# Patient Record
Sex: MALE | Race: WHITE
[De-identification: names, ages, dates, MRNs, and addresses within clinical notes are randomized per-mention and may not be internally consistent; named-entity substitution may affect disease eponyms.]

---

## 2018-06-08 ENCOUNTER — HOSPITAL ENCOUNTER (INPATIENT)
Dept: HOSPITAL 47 - 6ICU | Age: 78
LOS: 10 days | Discharge: NURSING, REHAB OR LONG-TERM CARE | DRG: 236 | End: 2018-06-18
Payer: MEDICARE

## 2018-06-08 VITALS — BODY MASS INDEX: 25.3 KG/M2

## 2018-06-08 DIAGNOSIS — M47.816: ICD-10-CM

## 2018-06-08 DIAGNOSIS — J96.11: ICD-10-CM

## 2018-06-08 DIAGNOSIS — Z86.14: ICD-10-CM

## 2018-06-08 DIAGNOSIS — K21.9: ICD-10-CM

## 2018-06-08 DIAGNOSIS — H26.9: ICD-10-CM

## 2018-06-08 DIAGNOSIS — F17.200: ICD-10-CM

## 2018-06-08 DIAGNOSIS — Z99.81: ICD-10-CM

## 2018-06-08 DIAGNOSIS — Z86.718: ICD-10-CM

## 2018-06-08 DIAGNOSIS — J44.9: ICD-10-CM

## 2018-06-08 DIAGNOSIS — Z82.49: ICD-10-CM

## 2018-06-08 DIAGNOSIS — Z79.02: ICD-10-CM

## 2018-06-08 DIAGNOSIS — Z79.84: ICD-10-CM

## 2018-06-08 DIAGNOSIS — Z80.0: ICD-10-CM

## 2018-06-08 DIAGNOSIS — G47.33: ICD-10-CM

## 2018-06-08 DIAGNOSIS — Z96.652: ICD-10-CM

## 2018-06-08 DIAGNOSIS — I25.110: ICD-10-CM

## 2018-06-08 DIAGNOSIS — I11.0: ICD-10-CM

## 2018-06-08 DIAGNOSIS — D75.82: ICD-10-CM

## 2018-06-08 DIAGNOSIS — I21.4: Primary | ICD-10-CM

## 2018-06-08 DIAGNOSIS — Z89.021: ICD-10-CM

## 2018-06-08 DIAGNOSIS — Z95.0: ICD-10-CM

## 2018-06-08 DIAGNOSIS — N40.0: ICD-10-CM

## 2018-06-08 DIAGNOSIS — I48.2: ICD-10-CM

## 2018-06-08 DIAGNOSIS — Z89.421: ICD-10-CM

## 2018-06-08 DIAGNOSIS — Z90.49: ICD-10-CM

## 2018-06-08 DIAGNOSIS — E11.51: ICD-10-CM

## 2018-06-08 DIAGNOSIS — E78.5: ICD-10-CM

## 2018-06-08 DIAGNOSIS — M51.9: ICD-10-CM

## 2018-06-08 DIAGNOSIS — Z87.01: ICD-10-CM

## 2018-06-08 DIAGNOSIS — D64.9: ICD-10-CM

## 2018-06-08 DIAGNOSIS — I08.0: ICD-10-CM

## 2018-06-08 DIAGNOSIS — Z79.01: ICD-10-CM

## 2018-06-08 DIAGNOSIS — Z79.82: ICD-10-CM

## 2018-06-08 DIAGNOSIS — I50.42: ICD-10-CM

## 2018-06-08 DIAGNOSIS — J98.11: ICD-10-CM

## 2018-06-08 DIAGNOSIS — Z79.899: ICD-10-CM

## 2018-06-08 DIAGNOSIS — I65.23: ICD-10-CM

## 2018-06-08 DIAGNOSIS — E11.40: ICD-10-CM

## 2018-06-08 DIAGNOSIS — Z87.11: ICD-10-CM

## 2018-06-08 DIAGNOSIS — G89.4: ICD-10-CM

## 2018-06-08 LAB
APTT BLD: 28.2 SEC (ref 22–30)
BASOPHILS # BLD AUTO: 0 K/UL (ref 0–0.2)
BASOPHILS NFR BLD AUTO: 0 %
EOSINOPHIL # BLD AUTO: 0.1 K/UL (ref 0–0.7)
EOSINOPHIL NFR BLD AUTO: 0 %
ERYTHROCYTE [DISTWIDTH] IN BLOOD BY AUTOMATED COUNT: 4.5 M/UL (ref 4.3–5.9)
ERYTHROCYTE [DISTWIDTH] IN BLOOD: 14.8 % (ref 11.5–15.5)
HCT VFR BLD AUTO: 38.9 % (ref 39–53)
HGB BLD-MCNC: 12.9 GM/DL (ref 13–17.5)
INR PPP: 1.4 (ref ?–1.2)
LYMPHOCYTES # SPEC AUTO: 1.4 K/UL (ref 1–4.8)
LYMPHOCYTES NFR SPEC AUTO: 12 %
MCH RBC QN AUTO: 28.7 PG (ref 25–35)
MCHC RBC AUTO-ENTMCNC: 33.2 G/DL (ref 31–37)
MCV RBC AUTO: 86.4 FL (ref 80–100)
MONOCYTES # BLD AUTO: 0.7 K/UL (ref 0–1)
MONOCYTES NFR BLD AUTO: 6 %
NEUTROPHILS # BLD AUTO: 8.9 K/UL (ref 1.3–7.7)
NEUTROPHILS NFR BLD AUTO: 80 %
PH UR: 5.5 [PH] (ref 5–8)
PLATELET # BLD AUTO: 164 K/UL (ref 150–450)
PT BLD: 13.2 SEC (ref 9–12)
RBC UR QL: >182 /HPF (ref 0–5)
SP GR UR: 1.02 (ref 1–1.03)
UROBILINOGEN UR QL STRIP: <2 MG/DL (ref ?–2)
WBC # BLD AUTO: 11.1 K/UL (ref 3.8–10.6)
WBC #/AREA URNS HPF: 21 /HPF (ref 0–5)

## 2018-06-08 PROCEDURE — 86900 BLOOD TYPING SEROLOGIC ABO: CPT

## 2018-06-08 PROCEDURE — 83036 HEMOGLOBIN GLYCOSYLATED A1C: CPT

## 2018-06-08 PROCEDURE — 83735 ASSAY OF MAGNESIUM: CPT

## 2018-06-08 PROCEDURE — 85520 HEPARIN ASSAY: CPT

## 2018-06-08 PROCEDURE — 84484 ASSAY OF TROPONIN QUANT: CPT

## 2018-06-08 PROCEDURE — 94760 N-INVAS EAR/PLS OXIMETRY 1: CPT

## 2018-06-08 PROCEDURE — 71045 X-RAY EXAM CHEST 1 VIEW: CPT

## 2018-06-08 PROCEDURE — 87205 SMEAR GRAM STAIN: CPT

## 2018-06-08 PROCEDURE — 84443 ASSAY THYROID STIM HORMONE: CPT

## 2018-06-08 PROCEDURE — 93306 TTE W/DOPPLER COMPLETE: CPT

## 2018-06-08 PROCEDURE — 80053 COMPREHEN METABOLIC PANEL: CPT

## 2018-06-08 PROCEDURE — 85610 PROTHROMBIN TIME: CPT

## 2018-06-08 PROCEDURE — 85730 THROMBOPLASTIN TIME PARTIAL: CPT

## 2018-06-08 PROCEDURE — 84132 ASSAY OF SERUM POTASSIUM: CPT

## 2018-06-08 PROCEDURE — 86891 AUTOLOGOUS BLOOD OP SALVAGE: CPT

## 2018-06-08 PROCEDURE — 86850 RBC ANTIBODY SCREEN: CPT

## 2018-06-08 PROCEDURE — 85025 COMPLETE CBC W/AUTO DIFF WBC: CPT

## 2018-06-08 PROCEDURE — 87070 CULTURE OTHR SPECIMN AEROBIC: CPT

## 2018-06-08 PROCEDURE — 86901 BLOOD TYPING SEROLOGIC RH(D): CPT

## 2018-06-08 PROCEDURE — 87077 CULTURE AEROBIC IDENTIFY: CPT

## 2018-06-08 PROCEDURE — 93923 UPR/LXTR ART STDY 3+ LVLS: CPT

## 2018-06-08 PROCEDURE — 71046 X-RAY EXAM CHEST 2 VIEWS: CPT

## 2018-06-08 PROCEDURE — 82805 BLOOD GASES W/O2 SATURATION: CPT

## 2018-06-08 PROCEDURE — 84100 ASSAY OF PHOSPHORUS: CPT

## 2018-06-08 PROCEDURE — 93880 EXTRACRANIAL BILAT STUDY: CPT

## 2018-06-08 PROCEDURE — 94640 AIRWAY INHALATION TREATMENT: CPT

## 2018-06-08 PROCEDURE — 86920 COMPATIBILITY TEST SPIN: CPT

## 2018-06-08 PROCEDURE — 80048 BASIC METABOLIC PNL TOTAL CA: CPT

## 2018-06-08 PROCEDURE — 93970 EXTREMITY STUDY: CPT

## 2018-06-08 PROCEDURE — 94002 VENT MGMT INPAT INIT DAY: CPT

## 2018-06-08 PROCEDURE — 82330 ASSAY OF CALCIUM: CPT

## 2018-06-08 PROCEDURE — 80074 ACUTE HEPATITIS PANEL: CPT

## 2018-06-08 PROCEDURE — 86022 PLATELET ANTIBODIES: CPT

## 2018-06-08 PROCEDURE — 87186 SC STD MICRODIL/AGAR DIL: CPT

## 2018-06-08 PROCEDURE — 87086 URINE CULTURE/COLONY COUNT: CPT

## 2018-06-08 PROCEDURE — 85027 COMPLETE CBC AUTOMATED: CPT

## 2018-06-08 PROCEDURE — 80061 LIPID PANEL: CPT

## 2018-06-08 PROCEDURE — 81001 URINALYSIS AUTO W/SCOPE: CPT

## 2018-06-08 RX ADMIN — POTASSIUM CHLORIDE SCH MLS/HR: 14.9 INJECTION, SOLUTION INTRAVENOUS at 16:30

## 2018-06-08 RX ADMIN — IPRATROPIUM BROMIDE AND ALBUTEROL SULFATE SCH ML: .5; 3 SOLUTION RESPIRATORY (INHALATION) at 15:51

## 2018-06-08 RX ADMIN — GABAPENTIN SCH MG: 100 CAPSULE ORAL at 16:30

## 2018-06-08 RX ADMIN — MUPIROCIN SCH APPLIC: 20 OINTMENT TOPICAL at 21:03

## 2018-06-08 RX ADMIN — SODIUM CHLORIDE SCH MLS/HR: 450 INJECTION, SOLUTION INTRAVENOUS at 21:01

## 2018-06-08 RX ADMIN — IPRATROPIUM BROMIDE AND ALBUTEROL SULFATE SCH ML: .5; 3 SOLUTION RESPIRATORY (INHALATION) at 20:42

## 2018-06-08 RX ADMIN — GABAPENTIN SCH MG: 100 CAPSULE ORAL at 21:03

## 2018-06-08 NOTE — US
EXAMINATION TYPE: US carotid duplex BILAT

 

DATE OF EXAM: 6/8/2018

 

COMPARISON: NONE

 

CLINICAL HISTORY: preop cardiac surgery.

 

EXAM MEASUREMENTS: 

 

RIGHT:  Peak Systolic Velocity (PSV) cm/sec

----- Right CCA:  71.6  

----- Right ICA:  81.0     

----- Right ECA:  101.1   

ICA/CCA ratio:  1.1    

 

RIGHT:  End Diastole cm/sec

----- Right CCA: 20.7   

----- Right ICA:  29.3      

----- Right ECA:  7.7     

 

LEFT:  Peak Systolic Velocity (PSV) cm/sec

----- Left CCA:  74.4  

----- Left ICA:  76.4   

----- Left ECA:  74.4  

ICA/CCA ratio:  1.0  

 

LEFT:  End Diastole cm/sec

----- Left CCA:  14.8  

----- Left ICA: 28.0

----- Left ECA:  0.0 

 

VERTEBRALS (direction of flow):

Right Vertebral: Antegrade

Left Vertebral: Antegrade

 

Rhythm:  Normal

 

Mild to moderate atherosclerotic changes with no significant velocity elevations.

 

 

 

IMPRESSION:  There is bilateral moderate plaque formation. There is antegrade flow in the vertebral a
rteries. The images and measurements suggest up to 50% stenosis in both internal carotid arteries.   


 

 

Criteria for Assigning % of Stenosis / Diameter reduction

(Estimation based on the indirect measurements of the internal carotid artery velocities (ICA PSV).

1.  Normal (no stenosis)=ICA PSV < 125 cm/s: ratio < 2.0: ICA EDV<40 cm/s.

2. Less than 50% stenosis=ICA PSV < 125 cm/s: ratio < 2.0: ICA EDV<40 cm/s.

3.  50 to 69% stenosis=ICA PSV of 125 to 230 cm/s: ration 2.0 ? 4.0: ICA EDV  cm/s.

4.  Greater than 70% stenosis to near occlusion= ICA PSV > 230 cm/s: ratio > 4.0: ICA EDV > 100 cm/s.
 

5.  Near occlusion= ICA PSV velocities may be low or undetectable: variable ratio and ICA EDV.

6.  Total occlusion=unable to detect flow.

## 2018-06-09 LAB
ALBUMIN SERPL-MCNC: 3.3 G/DL (ref 3.5–5)
ALP SERPL-CCNC: 66 U/L (ref 38–126)
ALT SERPL-CCNC: 35 U/L (ref 21–72)
ANION GAP SERPL CALC-SCNC: 10 MMOL/L
APTT BLD: 30.3 SEC (ref 22–30)
AST SERPL-CCNC: 33 U/L (ref 17–59)
BASOPHILS # BLD AUTO: 0 K/UL (ref 0–0.2)
BASOPHILS NFR BLD AUTO: 0 %
BUN SERPL-SCNC: 24 MG/DL (ref 9–20)
CALCIUM SPEC-MCNC: 8.6 MG/DL (ref 8.4–10.2)
CHLORIDE SERPL-SCNC: 101 MMOL/L (ref 98–107)
CHOLEST SERPL-MCNC: 124 MG/DL (ref ?–200)
CO2 SERPL-SCNC: 32 MMOL/L (ref 22–30)
EOSINOPHIL # BLD AUTO: 0.1 K/UL (ref 0–0.7)
EOSINOPHIL NFR BLD AUTO: 1 %
ERYTHROCYTE [DISTWIDTH] IN BLOOD BY AUTOMATED COUNT: 4.6 M/UL (ref 4.3–5.9)
ERYTHROCYTE [DISTWIDTH] IN BLOOD: 14.9 % (ref 11.5–15.5)
GLUCOSE BLD-MCNC: 87 MG/DL (ref 75–99)
GLUCOSE SERPL-MCNC: 111 MG/DL (ref 74–99)
HBA1C MFR BLD: 5.9 % (ref 4–6)
HCT VFR BLD AUTO: 40.4 % (ref 39–53)
HDLC SERPL-MCNC: 43 MG/DL (ref 40–60)
HGB BLD-MCNC: 13.3 GM/DL (ref 13–17.5)
INR PPP: 1.3 (ref ?–1.2)
LDLC SERPL CALC-MCNC: 61 MG/DL (ref 0–99)
LYMPHOCYTES # SPEC AUTO: 1.4 K/UL (ref 1–4.8)
LYMPHOCYTES NFR SPEC AUTO: 20 %
MAGNESIUM SPEC-SCNC: 2 MG/DL (ref 1.6–2.3)
MCH RBC QN AUTO: 28.9 PG (ref 25–35)
MCHC RBC AUTO-ENTMCNC: 32.9 G/DL (ref 31–37)
MCV RBC AUTO: 87.7 FL (ref 80–100)
MONOCYTES # BLD AUTO: 0.5 K/UL (ref 0–1)
MONOCYTES NFR BLD AUTO: 7 %
NEUTROPHILS # BLD AUTO: 5 K/UL (ref 1.3–7.7)
NEUTROPHILS NFR BLD AUTO: 71 %
PLATELET # BLD AUTO: 118 K/UL (ref 150–450)
POTASSIUM SERPL-SCNC: 3.7 MMOL/L (ref 3.5–5.1)
PROT SERPL-MCNC: 5.7 G/DL (ref 6.3–8.2)
PT BLD: 12.6 SEC (ref 9–12)
SODIUM SERPL-SCNC: 143 MMOL/L (ref 137–145)
TRIGL SERPL-MCNC: 102 MG/DL (ref ?–150)
WBC # BLD AUTO: 7 K/UL (ref 3.8–10.6)

## 2018-06-09 RX ADMIN — IPRATROPIUM BROMIDE AND ALBUTEROL SULFATE SCH ML: .5; 3 SOLUTION RESPIRATORY (INHALATION) at 08:58

## 2018-06-09 RX ADMIN — IPRATROPIUM BROMIDE AND ALBUTEROL SULFATE SCH ML: .5; 3 SOLUTION RESPIRATORY (INHALATION) at 15:45

## 2018-06-09 RX ADMIN — DIGOXIN SCH MCG: 250 TABLET ORAL at 08:40

## 2018-06-09 RX ADMIN — GABAPENTIN SCH MG: 100 CAPSULE ORAL at 23:32

## 2018-06-09 RX ADMIN — BUDESONIDE AND FORMOTEROL FUMARATE DIHYDRATE SCH PUFF: 160; 4.5 AEROSOL RESPIRATORY (INHALATION) at 19:55

## 2018-06-09 RX ADMIN — PANTOPRAZOLE SODIUM SCH MG: 40 TABLET, DELAYED RELEASE ORAL at 08:40

## 2018-06-09 RX ADMIN — SODIUM CHLORIDE SCH MLS/HR: 450 INJECTION, SOLUTION INTRAVENOUS at 23:32

## 2018-06-09 RX ADMIN — FUROSEMIDE SCH MG: 40 TABLET ORAL at 11:56

## 2018-06-09 RX ADMIN — POTASSIUM CHLORIDE SCH MLS/HR: 14.9 INJECTION, SOLUTION INTRAVENOUS at 20:43

## 2018-06-09 RX ADMIN — METOPROLOL TARTRATE SCH MG: 25 TABLET, FILM COATED ORAL at 12:40

## 2018-06-09 RX ADMIN — ASPIRIN 81 MG CHEWABLE TABLET SCH MG: 81 TABLET CHEWABLE at 08:40

## 2018-06-09 RX ADMIN — IPRATROPIUM BROMIDE AND ALBUTEROL SULFATE SCH ML: .5; 3 SOLUTION RESPIRATORY (INHALATION) at 01:36

## 2018-06-09 RX ADMIN — METOPROLOL TARTRATE SCH MG: 25 TABLET, FILM COATED ORAL at 20:43

## 2018-06-09 RX ADMIN — GABAPENTIN SCH MG: 100 CAPSULE ORAL at 08:40

## 2018-06-09 RX ADMIN — MUPIROCIN SCH APPLIC: 20 OINTMENT TOPICAL at 20:43

## 2018-06-09 RX ADMIN — IPRATROPIUM BROMIDE AND ALBUTEROL SULFATE SCH ML: .5; 3 SOLUTION RESPIRATORY (INHALATION) at 12:05

## 2018-06-09 RX ADMIN — IPRATROPIUM BROMIDE AND ALBUTEROL SULFATE SCH ML: .5; 3 SOLUTION RESPIRATORY (INHALATION) at 23:48

## 2018-06-09 RX ADMIN — IPRATROPIUM BROMIDE AND ALBUTEROL SULFATE SCH ML: .5; 3 SOLUTION RESPIRATORY (INHALATION) at 05:46

## 2018-06-09 RX ADMIN — POTASSIUM CHLORIDE SCH MLS/HR: 14.9 INJECTION, SOLUTION INTRAVENOUS at 17:31

## 2018-06-09 RX ADMIN — IPRATROPIUM BROMIDE AND ALBUTEROL SULFATE SCH: .5; 3 SOLUTION RESPIRATORY (INHALATION) at 00:16

## 2018-06-09 RX ADMIN — POTASSIUM CHLORIDE SCH MLS/HR: 14.9 INJECTION, SOLUTION INTRAVENOUS at 02:57

## 2018-06-09 RX ADMIN — IPRATROPIUM BROMIDE AND ALBUTEROL SULFATE SCH ML: .5; 3 SOLUTION RESPIRATORY (INHALATION) at 19:55

## 2018-06-09 RX ADMIN — MUPIROCIN SCH APPLIC: 20 OINTMENT TOPICAL at 08:40

## 2018-06-09 RX ADMIN — TAMSULOSIN HYDROCHLORIDE SCH MG: 0.4 CAPSULE ORAL at 08:40

## 2018-06-09 RX ADMIN — GABAPENTIN SCH MG: 100 CAPSULE ORAL at 17:32

## 2018-06-09 NOTE — PN
PROGRESS NOTE



Erlin is a 78-year-old gentleman that is admitted to the hospital following cardiac

catheterization that showed severe triple-vessel disease and left main stenosis. I do

not have his cath report at the time of this evaluation.  The patient had an

echocardiogram that showed moderate LV systolic dysfunction.  He is chest pain-free and

hemodynamically stable.



Labs show a hemoglobin of 13.3. Creatinine is 0.7. Potassium is 3.7.



On exam, comfortable at rest.  Vital signs are stable.  There is no jugular venous

distention.  Chest exam reveals diminished air entry at the bases.  Heart exam reveals

first and second heart sounds.  No gallop. Has a systolic murmur at the left lower

sternal border.  Abdomen is soft.  Exam of extremities did not reveal any edema.

Peripheral pulses are felt.



The patient is currently on aspirin, Lipitor, Lanoxin, IV heparin, Lopressor.



ASSESSMENT:

Severe triple-vessel coronary artery disease and left main stenosis.  The patient is

awaiting bypass surgery.  He will continue the IV heparin.





MMODL / IJN: [id number] / Job#: [id number]

## 2018-06-09 NOTE — P.GSCN
History of Present Illness


Consult date: 18


Reason for Consult: 





Symptomatic multivessel coronary artery disease, recommendations for myocardial 

revascularization.


Requesting physician: Javan Vang


History of present illness: 





This is 78-year-old gentleman who is followed by Javan Vang on an 

outpatient basis.  He has a past medical history significant for hypertension, 

severe COPD with a FEV1 of 45% of predicted and home oxygen use, chronic 

diastolic congestive heart failure, osteoarthritis, lumbar disc disease with 

chronic pain syndrome, history of deep vein thrombosis to his left leg, 

recurrent feet and hand ulcerations with osteomyelitis and history of MRSA 

requiring amputations, type 2 diabetes mellitus, peripheral vascular disease, 

remote history of pneumonia, non-ST elevated myocardial infarction this 

admission, history of early onset coronary artery disease with his brother 

being diagnosed with an MI at age 46, history of sick sinus syndrome with 

previous placement of permanent pacemaker, benign prostatic hypertrophy, 

history of chronic persistent atrial fibrillation on Coumadin therapy at home, 

remote history of smoking quit over 30 years ago, and GERD.  The patient 

presented to the emergency department at [facility] with 

complaints of episodes of chest pain and shortness of breath.  He reports that 

his chest pain has been present the last couple of days prior to presenting to 

the emergency department.  The pain has been radiating to his left arm causing 

some numbness and was associated with shortness of breath and episodes of 

nausea.  He denies any fevers, chills, vomiting, or diaphoresis.  In the 

emergency department a 12-lead EKG was completed which did showed atrial 

fibrillation with a heart rate of 70 and some T-wave inversion in his anterior 

lateral leads.  Troponins were drawn which were as high as 1.233, his 

hemoglobin A1c was 6.0, and his BNP level was 3727.  Subsequently due to the 

patient's presenting symptoms, 12-lead EKG changes and positive troponins he 

was evaluated by Dr. Hylton from cardiology. A cardiac catheterization was 

performed, which demonstrated a chronically occluded right coronary artery by 

the ostium which fills by collaterals from the left coronary system, his left 

main coronary artery with mild disease, mild disease to his proximal circumflex 

coronary artery, a 70% stenosis to his first obtuse marginal coronary artery, 

and a critical lesion to his proximal left anterior and mid left anterior 

descending coronary artery.  A 2-D echocardiogram was also completed which 

showed mild concentric left ventricular hypertrophy with a left ventricular 

systolic function to be mild to moderately impaired with an ejection fraction 

between 40 and 45%, mild aortic valve sclerosis, mild aortic valve regurgitation

, moderate mitral annular calcification with mild mitral valve regurgitation, 

and trace tricuspid valve regurgitation.  The above-mentioned studies were 

reviewed with the patient by Dr. Hylton and he was subsequently transferred to 

[facility] for further evaluation from Dr. Wolfe from 

cardiothoracic surgery.





Review of Systems





A 14 point review of systems was completed and was negative except as mentioned 

in the HPI.





Past Medical History


Past Medical History: Atrial Fibrillation, Chest Pain / Angina, Heart Failure, 

COPD, Diabetes Mellitus, Deep Vein Thrombosis (DVT) (To his left leg.), Eye 

Disorder (History of cataracts), GERD/Reflux, GI Bleed (1960-bleeding peptic 

ulcer(sx)stated had part of stomach and foot of intestines removed.), 

Hypertension, Myocardial Infarction (non Q-wave), Osteoarthritis (OA), 

Pneumonia (Remote history), Prostate Disorder, Respiratory Disorder (COPD, uses 

3 L nasal cannula at home.), Sleep Apnea/CPAP/BIPAP, Vascular Disorder


Additional Past Medical History / Comment(s): pt is rt side dominant. lumbar 

disc disease, history of cpap for obstructive sleep apnea-no longer. pt stated 

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 "pt stated may 

have had a mi or tia they were'nt sure".  History of sick sinus syndrome.


History of Any Multi-Drug Resistant Organisms: MRSA


Year Discovered:: 2000


MDRO Source:: Left Third Finger/ rt foot


Past Surgical History: Appendectomy, Back Surgery, Cholecystectomy, Heart 

Catheterization, Orthopedic Surgery, Pacemaker


Additional Past Surgical History / Comment(s): andrea cataracts, had part of 

stomach removed and a foot of bowel d/t bleeding peptic ulcers. total lt knee 

replacment,several sx on andrea feet had 4th toe each foot removed.rt hand 2nd 

finger partial amp and 3 rd finger amputated,


Past Anesthesia/Blood Transfusion Reactions: No Reported Reaction


Additional Past Anesthesia/Blood Transfusion Reaction / Comm: blood transfusion

- no reaction


Type of Cardiac Device: Permanent Pacemaker


Device Placement Date:: unk


Past Psychological History: No Psychological Hx Reported


Smoking Status: Former smoker (Quit over 30 years ago.)


Past Alcohol Use History: None Reported


Past Drug Use History: None Reported





- Past Family History


  ** Mother


Family Medical History: Cancer


Additional Family Medical History / Comment(s):  age 52





  ** Father


Family Medical History: Cancer


Additional Family Medical History / Comment(s): rectal cancer-  age 75





  ** Brother(s)


Family Medical History: Coronary Artery Disease (CAD), Myocardial Infarction (MI

)


Additional Family Medical History / Comment(s): History of myocardial 

infarction at age 46.





Medications and Allergies


 Home Medications











 Medication  Instructions  Recorded  Confirmed  Type


 


Atenolol [Tenormin] 50 mg PO BID 18 History


 


Digoxin [Lanoxin] 125 mcg PO DAILY 18 History


 


Furosemide [Lasix] 40 mg PO DAILY 18 History


 


Gabapentin [Neurontin] 100 mg PO TID 18 History


 


Ipratropium-Albuterol Nebulize 3 ml INHALATION RT-QID 18 History





[Duoneb 0.5 mg-3 mg/3 ml Soln]    


 


Lisinopril [Zestril] 10 mg PO DAILY 18 History


 


Losartan [Cozaar] 50 mg PO DAILY 18 History


 


Ranitidine HCl 150 mg PO HS 18 History


 


Tamsulosin HCl [Flomax] 0.4 mg PO DAILY 18 History


 


Warfarin [Coumadin] 5 mg PO DAILY 18 History


 


glipiZIDE XL [Glucotrol Xl] 2.5 mg PO DAILY 18 History











 Allergies











Allergy/AdvReac Type Severity Reaction Status Date / Time


 


inhaler AdvReac  Rapid Uncoded 18 15:04





   Heart Rate  














Surgical - Exam


 Vital Signs











Pulse Resp BP Pulse Ox


 


 64   15   109/53   98 


 


 18 14:29  18 14:29  18 14:29  18 14:29














- General


no distress, no pain, cachectic, chronically ill





- Eyes


PERRL, normal ocular movement





- ENT


normal pinna, normal nares, normal mucosa, no hearing loss, no congestion, 

dentures





- Neck





Neck is supple, no lymphadenopathy.


no masses, no bruits, trachea midline, no venous distension





- Respiratory





Lungs sounds essentially clear to his bilateral upper lobes, diminished his 

bilateral bases.  Respirations are symmetrical and nonlabored.  Oxygen 

saturation are 95% on 3 L nasal cannula.  He is achieving 1750 mL on her 

incentive spirometry.





- Cardiovascular





Irregular rhythm with controlled rate.  S1 and S2 present, negative for S3, 

gallop or murmur.  Bedside telemetry showing atrial fibrillation heart rate 68.

  No edema present.  Doppler pulses present to his bilateral dorsalis pedis.





- Abdomen





Abdomen is soft, nontender and nondistended.  Active bowel sounds all 4 

quadrants.  Tolerating oral intake.  No guarding or rigidity.  No organomegaly.





- Genitourinary





Toth catheter for accurate I&O.  Draining clear yellow urine.





- Rectum





Deferred





- Integumentary


no rash, no growths, no abnormal pigmentation





- Neurologic





No focal weakness.  Alert and oriented 3.





- Psychiatric


oriented to time, oriented to person, oriented to place, speech is normal, 

memory intact





Results





- Labs





 18 03:45





 18 10:06


 Abnormal Lab Results - Last 24 Hours (Table)











  18 Range/Units





  15:40 15:40 17:50 


 


WBC  11.1 H    (3.8-10.6)  k/uL


 


Hgb  12.9 L    (13.0-17.5)  gm/dL


 


Hct  38.9 L    (39.0-53.0)  %


 


Plt Count     (150-450)  k/uL


 


Neutrophils #  8.9 H    (1.3-7.7)  k/uL


 


PT   13.2 H   (9.0-12.0)  sec


 


INR   1.4 H   (<1.2)  


 


APTT     (22.0-30.0)  sec


 


Carbon Dioxide     (22-30)  mmol/L


 


BUN     (9-20)  mg/dL


 


Glucose     (74-99)  mg/dL


 


Total Protein     (6.3-8.2)  g/dL


 


Albumin     (3.5-5.0)  g/dL


 


Urine Protein    Trace H  (Negative)  


 


Urine Blood    Large H  (Negative)  


 


Ur Leukocyte Esterase    Small H  (Negative)  


 


Urine RBC    >182 H  (0-5)  /hpf


 


Urine WBC    21 H  (0-5)  /hpf


 


Urine Mucus    Rare H  (None)  /hpf














  18 Range/Units





  03:45 03:45 03:45 


 


WBC     (3.8-10.6)  k/uL


 


Hgb     (13.0-17.5)  gm/dL


 


Hct     (39.0-53.0)  %


 


Plt Count   118 L   (150-450)  k/uL


 


Neutrophils #     (1.3-7.7)  k/uL


 


PT    12.6 H  (9.0-12.0)  sec


 


INR    1.3 H  (<1.2)  


 


APTT    30.3 H  (22.0-30.0)  sec


 


Carbon Dioxide  32 H    (22-30)  mmol/L


 


BUN  24 H    (9-20)  mg/dL


 


Glucose  111 H    (74-99)  mg/dL


 


Total Protein  5.7 L    (6.3-8.2)  g/dL


 


Albumin  3.3 L    (3.5-5.0)  g/dL


 


Urine Protein     (Negative)  


 


Urine Blood     (Negative)  


 


Ur Leukocyte Esterase     (Negative)  


 


Urine RBC     (0-5)  /hpf


 


Urine WBC     (0-5)  /hpf


 


Urine Mucus     (None)  /hpf








 Microbiology - Last 24 Hours (Table)











 18 17:50 Urine Culture - Preliminary





 Urine,Catheterized 


 


 18 21:10 Nasal Screen MRSA/MSSA ([facility]) - Preliminary





 Nasal Swab 








 Diabetes panel











  18 Range/Units





  15:40 03:45 


 


Sodium   143  (137-145)  mmol/L


 


Potassium   3.7  (3.5-5.1)  mmol/L


 


Chloride   101  ()  mmol/L


 


Carbon Dioxide   32 H  (22-30)  mmol/L


 


BUN   24 H  (9-20)  mg/dL


 


Creatinine   0.72  (0.66-1.25)  mg/dL


 


Glucose   111 H  (74-99)  mg/dL


 


Hemoglobin A1c  5.9   (4.0-6.0)  %


 


Calcium   8.6  (8.4-10.2)  mg/dL


 


AST   33  (17-59)  U/L


 


ALT   35  (21-72)  U/L


 


Alkaline Phosphatase   66  ()  U/L


 


Total Protein   5.7 L  (6.3-8.2)  g/dL


 


Albumin   3.3 L  (3.5-5.0)  g/dL


 


Triglycerides   102  (<150)  mg/dL


 


HDL Cholesterol   43  (40-60)  mg/dL








 Thyroid panel











  18 Range/Units





  03:45 


 


TSH  2.700  (0.465-4.680)  mIU/L








 Calcium panel











  18 Range/Units





  03:45 


 


Calcium  8.6  (8.4-10.2)  mg/dL


 


Phosphorus  3.6  (2.5-4.5)  mg/dL


 


Albumin  3.3 L  (3.5-5.0)  g/dL








 Pituitary panel











  18 Range/Units





  03:45 


 


Sodium  143  (137-145)  mmol/L


 


Potassium  3.7  (3.5-5.1)  mmol/L


 


Chloride  101  ()  mmol/L


 


Carbon Dioxide  32 H  (22-30)  mmol/L


 


BUN  24 H  (9-20)  mg/dL


 


Creatinine  0.72  (0.66-1.25)  mg/dL


 


Glucose  111 H  (74-99)  mg/dL


 


Calcium  8.6  (8.4-10.2)  mg/dL


 


TSH  2.700  (0.465-4.680)  mIU/L








 Adrenal panel











  18 Range/Units





  03:45 


 


Sodium  143  (137-145)  mmol/L


 


Potassium  3.7  (3.5-5.1)  mmol/L


 


Chloride  101  ()  mmol/L


 


Carbon Dioxide  32 H  (22-30)  mmol/L


 


BUN  24 H  (9-20)  mg/dL


 


Creatinine  0.72  (0.66-1.25)  mg/dL


 


Glucose  111 H  (74-99)  mg/dL


 


Calcium  8.6  (8.4-10.2)  mg/dL


 


Total Bilirubin  0.5  (0.2-1.3)  mg/dL


 


AST  33  (17-59)  U/L


 


ALT  35  (21-72)  U/L


 


Alkaline Phosphatase  66  ()  U/L


 


Total Protein  5.7 L  (6.3-8.2)  g/dL


 


Albumin  3.3 L  (3.5-5.0)  g/dL














- Imaging


Chest x-ray: report reviewed, image reviewed


EKG: image reviewed


Additional studies: 





2-D echocardiogram results reviewed.  Carotid Doppler results reviewed.  Vein 

mapping results reviewed.





Assessment and Plan


(1) Hypertension


Current Visit: Yes   Status: Acute   Code(s): I10 - ESSENTIAL (PRIMARY) 

HYPERTENSION   SNOMED Code(s): 43424649


   





(2) Non-ST elevated myocardial infarction


Current Visit: Yes   Status: Acute   Code(s): I21.4 - NON-ST ELEVATION (NSTEMI) 

MYOCARDIAL INFARCTION   SNOMED Code(s): 115876954


   





(3) COPD (chronic obstructive pulmonary disease)


Current Visit: Yes   Status: Acute   Code(s): J44.9 - CHRONIC OBSTRUCTIVE 

PULMONARY DISEASE, UNSPECIFIED   SNOMED Code(s): 71272984


   





(4) Lumbar disc disease


Current Visit: Yes   Status: Acute   Code(s): M51.9 - UNSP THORACIC, THORACOLUM 

AND LUMBOSACR INTVRT DISC DISORDER   SNOMED Code(s): 030385712


   





(5) Peripheral vascular disease


Current Visit: Yes   Status: Acute   Code(s): I73.9 - PERIPHERAL VASCULAR 

DISEASE, UNSPECIFIED   SNOMED Code(s): 399133564


   





(6) Diabetes mellitus type 2 in nonobese


Current Visit: Yes   Status: Acute   Code(s): E11.9 - TYPE 2 DIABETES MELLITUS 

WITHOUT COMPLICATIONS   SNOMED Code(s): 564137268


   





(7) History of smoking


Current Visit: Yes   Status: Acute   Code(s): Z87.891 - PERSONAL HISTORY OF 

NICOTINE DEPENDENCE   SNOMED Code(s): 51564851451875572


   





(8) Chronic diastolic CHF (congestive heart failure)


Current Visit: Yes   Status: Acute   Code(s): I50.32 - CHRONIC DIASTOLIC (

CONGESTIVE) HEART FAILURE   SNOMED Code(s): 312918913


   





(9) Chronic pain syndrome


Current Visit: Yes   Status: Acute   Code(s): G89.4 - CHRONIC PAIN SYNDROME   

SNOMED Code(s): 918676398


   





(10) History of MRSA infection


Current Visit: Yes   Status: Acute   Code(s): Z86.14 - PERSONAL HISTORY OF 

METHICILLIN RESIS STAPH INFECTION   SNOMED Code(s): 917013399


   





(11) Chronic atrial fibrillation


Current Visit: Yes   Status: Acute   Code(s): I48.2 - CHRONIC ATRIAL 

FIBRILLATION   SNOMED Code(s): 034551893


   





(12) GERD (gastroesophageal reflux disease)


Current Visit: Yes   Status: Acute   Code(s): K21.9 - GASTRO-ESOPHAGEAL REFLUX 

DISEASE WITHOUT ESOPHAGITIS   SNOMED Code(s): 863320534


   





(13) BPH (benign prostatic hyperplasia)


Current Visit: Yes   Status: Acute   Code(s): N40.0 - BENIGN PROSTATIC 

HYPERPLASIA WITHOUT LOWER URINRY TRACT SYMP   SNOMED Code(s): 333967285


   





(14) History of sick sinus syndrome


Current Visit: Yes   Status: Acute   Code(s): Z86.79 - PERSONAL HISTORY OF 

OTHER DISEASES OF THE CIRCULATORY SYSTEM   SNOMED Code(s): 293711357275524


   





(15) History of permanent cardiac pacemaker placement


Current Visit: Yes   Status: Acute   Code(s): Z95.0 - PRESENCE OF CARDIAC 

PACEMAKER   SNOMED Code(s): 043457051


   





(16) Family history of coronary artery disease in brother


Current Visit: Yes   Status: Acute   Code(s): Z82.49 - FAMILY HX OF ISCHEM 

HEART DIS AND OTH DIS OF THE CIRC SYS   SNOMED Code(s): 959674483


   





(17) History of Coumadin therapy


Current Visit: Yes   Status: Acute   Code(s): Z92.29 - PERSONAL HISTORY OF 

OTHER DRUG THERAPY   SNOMED Code(s): 372225073


   


Plan: 





the patient was seen and examined.  His chart and diagnostics were reviewed.  

Preoperative testing was initiated.  The patient has been seen and examined by 

Dr. Galina Wolfe from cardiothoracic surgery.  Preoperative teaching was 

initiated.  Continue medical management.  We will add atorvastatin 80 mg by 

mouth daily and metoprolol 12.5 mg by mouth twice a day.  A 5 m walk test was 

completed, time 1:7.55 seconds, time 2: 6.55 seconds, time 3: 6.48 seconds.  

STS risk score was calculated 6.265 risk of mortality and was discussed with 

the patient by Dr. Wolfe.





Thank you Dr. Vang for this consult and we look forward to working with the 

care the patient.


Time with Patient: Greater than 30

## 2018-06-09 NOTE — HP
HISTORY AND PHYSICAL



Mr. Ash is a 78-year-old gentleman who was transferred from [facility] after presenting there with chest pain and shortness of breath.  He was 
found to

have elevated troponin values and subsequent heart catheterization revealed 
severe

triple-vessel disease and he has been transported here for further evaluation 
and

appropriate treatment.  The patient was seen by Dr. Hylton of Cardiology at [facility] [place].



PAST MEDICAL HISTORY:

Is positive for severe underlying COPD, previous smoking history.  He also has a

history of coronary artery disease, chronic diastolic congestive heart failure,

osteoarthritis, specifically of the LS spine with chronic pain syndrome and 
recurrent

feet and hand ulcerations with osteomyelitis requiring some amputations in the 
past.

The patient also has underlying neuropathy and type 2 diabetes. BPH, GERD.



HOME MEDICATIONS:

Medications on transfer include his DuoNeb respiratory treatments q.4 hours, 
aspirin 81

mg, digoxin 0.25 daily, furosemide 40 mg daily, gabapentin 100 mg 3 times a day,

Glucotrol 2.5 at breakfast, Protonix 40 mg a.c. breakfast and Flomax 0.4 mg at

breakfast.



ALLERGIES:

No known allergies.



REVIEW OF SYSTEMS:

As mentioned in history of present illness with recurrent anterior chest pain 
that

prior to admission had occurred several times with minimal exertion and also 
with awakening

him up at night, associated with shortness of breath, some cough.  No fever or 
chills.

Some nausea, but no nausea, vomiting.  No unusual urinary or bowel 
symptomatology.  No

unusual leg edema at this time.



FAMILY HISTORY:

Noncontributory.



SOCIAL HISTORY:

Is positive for the smoking history.  No history of any excessive alcohol 
usage.  He

has been  for the past year and does have family and some children 
support.



PHYSICAL EXAMINATION:

GENERAL: He is alert and oriented.  Lying flat in bed, in no acute distress at 
this

time.

VITAL SIGNS:  The pulse of 63, respirations 18, blood pressure 101/50, and he 
is 95%

saturated on 2 L.  Temperature was 98.3.

HEENT: Head and neck exam unremarkable.

LUNGS:  Were generally diminished but otherwise clear without wheezing.

HEART:  Tones were a regular without murmurs or rubs.  ABDOMEN was soft and 
nontender.

EXTREMITIES:  Revealed no unusual edema.  He does have some partial amputation 
of toes

on the right foot with history of osteomyelitis in the past.

NEUROLOGICALLY: He is alert and oriented.  Moving all extremities.  No focal 
weakness

noted.



LABS:

Revealed a hemoglobin 12.9 with white count 11.1, and a platelet count of 164.  
His INR

was 1.4.  Urinalysis did reveal blood, greater than 182 red cells, but only 21 
white

cells.  Leukocyte esterase was small.  Chest x-ray revealed no acute process, 
though

consistent with COPD.  The patient did have a carotid vascular studies which 
did reveal

some bilateral plaque formation.  Antegrade vertebral flow with measurements 
suggesting

50% stenosis of both internal carotid arteries.



IMPRESSION:

This gentleman with a non ST-segment elevated myocardial infarction with 
recurrent

chest pain and elevated troponins at [facility] [place] and also post

catheterization and triple-vessel disease with underlying history of 
atherosclerotic

heart disease, atrial fibrillation, and diastolic congestive heart failure with 
other

comorbidities include diabetes, hypertension, previous tobacco usage and chronic

obstructive pulmonary disease and also underlying severe degenerative joint 
disease of

the spine and recurrent ulcerations of his hands and feet requiring partial

amputations.  BPH and GERD.  The patient is to be evaluated here by Cardiology, 
Thoracic surgery and Pulmonary

Medicine.  Discussed with the patient and family at bedside.  We will await 
further

recommendations in terms of further management. Patient and family seem to 
understand the seriousness of patient's medical condition.





MMODL / IJN: [id number] / Job#: [id number]

MTDD

## 2018-06-09 NOTE — ECHOF
Referral Reason:preop cardiac surgery



MEASUREMENTS

--------

HEIGHT: 172.7 cm

WEIGHT: 72.6 kg

BP: 

IVSd:   1.3 cm     (0.6 - 1.1)

LVIDd:   4.8 cm     (3.9 - 5.3)

LVPWd:   1.2 cm     (0.6 - 1.1)

IVSs:   1.5 cm

LVIDs:   3.0 cm

LVPWs:   1.4 cm

LAESV Index (A-L):   26.32 ml/m

Ao Diam:   3.1 cm     (2.0 - 3.7)

AV Cusp:   1.4 cm     (1.5 - 2.6)

MV E Margarito:   1.26 m/s

MV DecT:   164 ms

MV A Margarito:   0.00 m/s

MV E/A Ratio:   [id number] 

RAP:   15.00 mmHg

RVSP:   17.86 mmHg







FINDINGS

--------

Atrial fibrillation.

This was a technically difficult study with suboptimal views.

The left ventricular size is normal.   There is mild concentric left ventricular hypertrophy.   Overa
ll left ventricular systolic function is mild-moderately impaired with, an EF between 40 - 45 %.

The right ventricle is normal in size and function.

Normal LA  size by volume 22+/-6 ml/m2.

The right atrium is normal in size.

3ml of Lumason was utilized for enhancement of images.

There is mild aortic valve sclerosis.   Trace to mild aortic regurgitation.   There is no evidence of
 aortic stenosis.

Moderate mitral annular calcification present.   There is trace to mild mitral regurgitation.

Trace tricuspid regurgitation present.   Right ventricular systolic pressure is normal at < 35 mmHg. 
  There is no evidence of pulmonary hypertension.

The pulmonic valve was not well visualized.

The aortic root size is normal.

The inferior vena cava is dilated with poor inspiratory collapse which is consistent with estimated r
ight atrial pressure of 20 mmHg.

There is no pericardial effusion.



CONCLUSIONS

--------

1. Atrial fibrillation.

2. This was a technically difficult study with suboptimal views.

3. The left ventricular size is normal.

4. There is mild concentric left ventricular hypertrophy.

5. Overall left ventricular systolic function is mild-moderately impaired with, an EF between 40 - 45
 %.

6. Normal LA size by volume 22+/-6 ml/m2.

7. 3ml of Lumason was utilized for enhancement of images.

8. There is mild aortic valve sclerosis.

9. Trace to mild aortic regurgitation.

10. Moderate mitral annular calcification present.

11. There is trace to mild mitral regurgitation.

12. Trace tricuspid regurgitation present.

13. Right ventricular systolic pressure is normal at < 35 mmHg.

14. There is no evidence of pulmonary hypertension.

15. The pulmonic valve was not well visualized.

16. The aortic root size is normal.

17. The inferior vena cava is dilated with poor inspiratory collapse which is consistent with estimat
ed right atrial pressure of 20 mmHg.

18. There is no pericardial effusion.





SONOGRAPHER: Richie Jefferson RDCS

## 2018-06-09 NOTE — XR
EXAMINATION TYPE: XR chest 1V portable

 

DATE OF EXAM: 6/9/2018

 

COMPARISON: 3/20/2018

 

HISTORY: Pre-op cardiac surgery

 

TECHNIQUE: Single frontal view of the chest is obtained.

 

FINDINGS:  Overlying external pacing devices are seen with multiple lines. Left-sided lead cardiac de
vice is again noted with redemonstration of mild cardiomegaly. Minimal bibasilar subsegmental atelect
asis is horizontally oriented at the left lung base. No focal consolidation, pleural effusion or pneu
mothorax. Moderate acromioclavicular arthropathy and degenerative changes of the thoracic spine. No s
izable pleural effusion or pneumothorax

 

IMPRESSION:  Normal left basilar subsegmental atelectasis otherwise unremarkable exam.

## 2018-06-09 NOTE — P.PN
Progress Note - Text





The patient is a 78-year-old gentleman who was transferred yesterday from 

[facility] after he presented there with chest pain and an 

elevated troponin value along with anterior T-wave changes consistent with a non

-ST segment elevated myocardial infarction.  On catheterization he was found to 

have rather severe triple-vessel coronary artery disease.  Patient has been 

transferred here for evaluation for further treatment.





Patient does have other risk factors that include his underlying COPD with 

previous smoking history.  Chronic diastolic/systolic congestive heart failure, 

osteoarthritis specifically LS spine with chronic pain.  Underlying history of 

diabetes with neuropathy along with BPH and gastroesophageal reflux and chronic 

atrial fibrillation for which she has been on Coumadin prior to this admission.

  The patient has also had finger and feet ulcerations requiring amputations in 

the past.





Patient presently is sitting up in bed.  Alert and oriented.  Does not appear 

to be in any distress.  Denies any chest pain at this time.  No nausea or 

vomiting.





Vital signs


Blood pressure is 111/49 with a pulse of 80 and respirations 13.  He is 95% 

saturated on 2 L.


Lungs are generally clear without wheezing.  Heart tones are controlled with 

mild irregularity.  Abdomen nontender.  No unusual edema.  No focal 

neurological changes at this time.





Laboratory


White count of 7 with a hemoglobin 13.3 and a platelet count of 118 that has 

changed from 164 yesterday.


INR is 1.3 his patient is off his Coumadin.  PTT is 30.3.


Sodium 143 with a potassium 3.7.  BUN of 24 with creatinine of 0.72 given a GFR 

of 89.  Blood sugars 111.


Albumin slightly low at 3.3.  TSH was normal at 2.7.


Urinalysis positive for hematuria with 182 RBCs with 21 white cells but small 

amount of leukocyte esterase.  Culture is pending.





Ejection fraction on echo was 40-45%.





Portable chest x-ray showed some left basilar subsegmental atelectasis but 

otherwise unremarkable.





Impressions and plans


As discussed with patient at bedside this morning.  Workup is ongoing to 

evaluate patient for likely coronary artery bypass surgery.


Cardiology, thoracic surgery and pulmonary medicine on the case.  We'll await 

their further recommendations.

## 2018-06-09 NOTE — P.CNPUL
History of Present Illness


Consult date: 18


Requesting physician: Javan Vang


Reason for consult: COPD


Chief complaint: Chest pain


History of present illness: 





This is a 78-year-old white male, familiar to my service, patient is known to 

have history of severe COPD, FEV1 is in the range of 45%, 1.27 L, patient is O2 

dependent, maintained on 3 L nasal cannula.  He was admitted yesterday to [facility] with acute chest pain, and increased shortness of breath.  

He was also noted to have elevated troponin and non-ST elevation myocardial 

infarction.  Undergone cardiac catheterization, and he was found to have severe 

triple-vessel coronary artery disease.  Patient was transferred yesterday to 

[facility], he will be seen by cardiac surgery, and 

anticipating myocardial revascularization early next week.  Based on the 

pulmonary status, patient has severe COPD, he is definitely a high surgical risk

, but no absolute contraindication to surgery.  During my evaluation, patient 

was complaining of some shortness of breath, no chest pain, no cough, no 

wheezing, no fever, no chills, no hemoptysis, no nausea, no vomiting, no 

abdominal pain.  Patient is known to have history of multiple medical problems 

including COPD, chronic atrial fibrillation, previous pacemaker implantation, 

hypertension, previous osteomyelitis involving feet, history of diabetes and 

peripheral vessel occlusive disease as well as diabetic neuropathy.  He has 

chronic systolic congestive heart failure, ejection fraction is in the range of 

45%.





Review of Systems





14 point review of systems were obtained, please refer to pertinent positives 

and negatives as noted in HPI.





Past Medical History


Past Medical History: Atrial Fibrillation, Chest Pain / Angina, Heart Failure, 

COPD, Diabetes Mellitus, Deep Vein Thrombosis (DVT) (To his left leg.), Eye 

Disorder (History of cataracts), GERD/Reflux, GI Bleed (1960-bleeding peptic 

ulcer(sx)stated had part of stomach and foot of intestines removed.), 

Hypertension, Myocardial Infarction (non Q-wave), Osteoarthritis (OA), 

Pneumonia (Remote history), Prostate Disorder, Respiratory Disorder (COPD, uses 

3 L nasal cannula at home.), Sleep Apnea/CPAP/BIPAP, Vascular Disorder


Additional Past Medical History / Comment(s): pt is rt side dominant. lumbar 

disc disease, history of cpap for obstructive sleep apnea-no longer. pt stated 

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 "pt stated may 

have had a mi or tia they were'nt sure".  History of sick sinus syndrome.


History of Any Multi-Drug Resistant Organisms: MRSA


Date of last positivie culture/infection: 2000


MDRO Source:: Left Third Finger/ rt foot


Past Surgical History: Appendectomy, Back Surgery, Cholecystectomy, Heart 

Catheterization, Orthopedic Surgery, Pacemaker


Additional Past Surgical History / Comment(s): andrea cataracts, had part of 

stomach removed and a foot of bowel d/t bleeding peptic ulcers. total lt knee 

replacment,several sx on andrea feet had 4th toe each foot removed.rt hand 2nd 

finger partial amp and 3 rd finger amputated,


Past Anesthesia/Blood Transfusion Reactions: No Reported Reaction


Additional Past Anesthesia/Blood Transfusion Reaction / Comment(s): blood 

transfusion- no reaction


Type of Cardiac Device: Permanent Pacemaker


Device Placement Date:: unk


Past Psychological History: No Psychological Hx Reported


Smoking Status: Former smoker (Quit over 30 years ago.)


Past Alcohol Use History: None Reported


Past Drug Use History: None Reported





- Past Family History


  ** Mother


Family Medical History: Cancer


Additional Family Medical History / Comment(s):  age 52





  ** Father


Family Medical History: Cancer


Additional Family Medical History / Comment(s): rectal cancer-  age 75





  ** Brother(s)


Family Medical History: Coronary Artery Disease (CAD), Myocardial Infarction (MI

)


Additional Family Medical History / Comment(s): History of myocardial 

infarction at age 46.





Medications and Allergies


 Home Medications











 Medication  Instructions  Recorded  Confirmed  Type


 


Atenolol [Tenormin] 50 mg PO BID 18 History


 


Digoxin [Lanoxin] 125 mcg PO DAILY 18 History


 


Furosemide [Lasix] 40 mg PO DAILY 18 History


 


Gabapentin [Neurontin] 100 mg PO TID 18 History


 


Ipratropium-Albuterol Nebulize 3 ml INHALATION RT-QID 18 History





[Duoneb 0.5 mg-3 mg/3 ml Soln]    


 


Lisinopril [Zestril] 10 mg PO DAILY 18 History


 


Losartan [Cozaar] 50 mg PO DAILY 18 History


 


Ranitidine HCl 150 mg PO HS 18 History


 


Tamsulosin HCl [Flomax] 0.4 mg PO DAILY 18 History


 


Warfarin [Coumadin] 5 mg PO DAILY 18 History


 


glipiZIDE XL [Glucotrol Xl] 2.5 mg PO DAILY 18 History











 Allergies











Allergy/AdvReac Type Severity Reaction Status Date / Time


 


inhaler AdvReac  Rapid Uncoded 18 15:04





   Heart Rate  














Physical Exam


Vitals: 


 Vital Signs











  Temp Pulse Resp BP Pulse Ox


 


 18 10:00   80  13  111/49  95


 


 18 09:10   69   


 


 18 09:00   79  21  96/52  95


 


 18 08:58   79    96


 


 18 08:00  98.5 F  76  14  112/58  95


 


 18 07:00   64  13  114/58  93 L


 


 18 06:01   57 L   


 


 18 06:00   58 L  18  114/63  99


 


 18 05:48   57 L   


 


 18 05:00   50 L  18  101/46  94 L


 


 18 04:00  98 F  52 L  23  106/50  97


 


 18 03:00   63  13  110/64  96


 


 18 02:00   60  12  113/58  93 L


 


 18 01:49   68   


 


 18 01:40   58 L   


 


 18 01:00   52 L  15  102/68  98


 


 18 00:00  98.2 F  49 L  15  97/49  96


 


 18 23:00   63  18  101/50  95


 


 18 22:00   60  19  112/59  94 L


 


 18 21:00   66  22  97/51  95


 


 18 20:52   66   


 


 18 20:44      97


 


 18 20:43   62   


 


 18 20:00  98.3 F  59 L  18  105/65  94 L


 


 18 19:00   62  25 H  114/61  95


 


 18 18:30   65  13  114/61  95


 


 06/08/18 18:00   45 L  29 H   97


 


 06/08/18 17:30   62  25 H   96


 


 18 16:59   49 L  21   96


 


 18 16:30   69  18   95


 


 18 16:00   55 L  10 L  109/53  97


 


 18 15:55   48 L   


 


 18 15:30   48 L  18  109/53  98


 


 18 15:00   53 L  14  109/53  96


 


 18 14:30  98.0 F  61  28 H  109/53  97


 


 18 14:29   64  15  109/53  98








 Intake and Output











 18





 22:59 06:59 14:59


 


Intake Total 1040 938.408 200


 


Output Total 


 


Balance 415 458.408 -1135


 


Intake:   


 


   800 200


 


    Dextrose 5%-0.45% NaCl 1, 300 800 200





    000 ml @ 100 mls/hr IV .   





    Q10H RHONDA Rx#:[id number]   


 


  Intake, IV Titration 500 138.408 





  Amount   


 


    Dextrose 5%-0.45% NaCl 1, 500  





    000 ml @ 100 mls/hr IV .   





    Q10H RHONDA Rx#:[id number]   


 


    Heparin Sodium,Porcine/  138.408 





    D5w Pmx 25,000 unit In   





    Dextrose/Water 1 500ml.   





    bag @ 12 UNITS/KG/HR 17.   





    52 mls/hr IV .Q24H RHONDA Rx   





    #:[id number]   


 


  Oral 240  


 


Output:   


 


  Urine 


 


Other:   


 


  Voiding Method Indwelling Catheter Indwelling Catheter Indwelling Catheter


 


  Weight 73 kg 73.4 kg 














Physical Exam: Revealed a 78-year-old white male in no distress.


Head: Atraumatic, normocephalic.


HEENT:[Neck is supple.] [No neck masses.] [No thyromegaly.] [No JVD.]


Chest: [Diminished breath sounds at the bases, no crackles or rhonchi or wheezes

, no chest wall tenderness..]


Cardiac Exam: [Normal S1 and S2, no S3 gallop, no murmur.]


Abdomen: [Soft, nontender,  no megaly, no rebound, no guarding, normal bowel 

sounds.]


Extremities: [No clubbing, no edema, no cyanosis.]  Deformities noted in toes 

bilaterally in both feet.  Diminished distal pulses bilaterally


Neurological Exam: [No focal neurologic deficit.]


Psychiatric: Normal mood affect and mental status examination.


Lymphatics: No lymphadenopathy was appreciated.





Results





- Laboratory Findings


CBC and BMP: 


 18 03:45





 18 10:06


PT/INR, D-dimer











PT  12.6 sec (9.0-12.0)  H  18  03:45    


 


INR  1.3  (<1.2)  H  18  03:45    








Abnormal lab findings: 


 Abnormal Labs











  18





  15:40 15:40 17:50


 


WBC  11.1 H  


 


Hgb  12.9 L  


 


Hct  38.9 L  


 


Plt Count   


 


Neutrophils #  8.9 H  


 


PT   13.2 H 


 


INR   1.4 H 


 


APTT   


 


Carbon Dioxide   


 


BUN   


 


Glucose   


 


Troponin I   


 


Total Protein   


 


Albumin   


 


Urine Protein    Trace H


 


Urine Blood    Large H


 


Ur Leukocyte Esterase    Small H


 


Urine RBC    >182 H


 


Urine WBC    21 H


 


Urine Mucus    Rare H














  18





  03:45 03:45 03:45


 


WBC   


 


Hgb   


 


Hct   


 


Plt Count   118 L 


 


Neutrophils #   


 


PT    12.6 H


 


INR    1.3 H


 


APTT    30.3 H


 


Carbon Dioxide  32 H  


 


BUN  24 H  


 


Glucose  111 H  


 


Troponin I   


 


Total Protein  5.7 L  


 


Albumin  3.3 L  


 


Urine Protein   


 


Urine Blood   


 


Ur Leukocyte Esterase   


 


Urine RBC   


 


Urine WBC   


 


Urine Mucus   














  18





  10:06 10:06


 


WBC  


 


Hgb  


 


Hct  


 


Plt Count  


 


Neutrophils #  


 


PT  


 


INR  


 


APTT   31.0 H


 


Carbon Dioxide  


 


BUN  


 


Glucose  


 


Troponin I  0.238 H* 


 


Total Protein  


 


Albumin  


 


Urine Protein  


 


Urine Blood  


 


Ur Leukocyte Esterase  


 


Urine RBC  


 


Urine WBC  


 


Urine Mucus  














- Diagnostic Findings


Chest x-ray: image reviewed (Left basilar atelectasis otherwise unremarkable.)





Assessment and Plan


Assessment: 





Impression:





1 acute non-ST elevation myocardial infarction, status post cardiac 

catheterization revealing severe triple-vessel coronary artery disease.





2 severe COPD, and chronic hypoxic respiratory failure secondary to COPD FEV1 

is in the range of 45%, 1.27 L.  Patient is definitely a high surgical risk.





3 multiple comorbidities including chronic atrial fibrillation, type 2 diabetes 

with diabetic neuropathy, peripheral vessel occlusive disease, history of 

osteomyelitis and subsequent amputations of toes.  History of hypertension, 

history of systolic congestive heart failure.





Recommendation: Continue present treatment plan, patient is yet to be seen by 

cardiac surgery on consultation, clearly the patient is a high surgical risk.  

Decision regarding surgery will be made by cardiac surgeon on the case.  Again 

based on his COPD status and based on his chronic hypoxic respiratory failure, 

patient is a significant surgical risk.


Time with Patient: Greater than 30

## 2018-06-10 LAB
ANION GAP SERPL CALC-SCNC: 13 MMOL/L
BASOPHILS # BLD AUTO: 0 K/UL (ref 0–0.2)
BASOPHILS NFR BLD AUTO: 0 %
BUN SERPL-SCNC: 22 MG/DL (ref 9–20)
CALCIUM SPEC-MCNC: 8.9 MG/DL (ref 8.4–10.2)
CHLORIDE SERPL-SCNC: 98 MMOL/L (ref 98–107)
CO2 SERPL-SCNC: 30 MMOL/L (ref 22–30)
EOSINOPHIL # BLD AUTO: 0 K/UL (ref 0–0.7)
EOSINOPHIL NFR BLD AUTO: 0 %
ERYTHROCYTE [DISTWIDTH] IN BLOOD BY AUTOMATED COUNT: 4.95 M/UL (ref 4.3–5.9)
ERYTHROCYTE [DISTWIDTH] IN BLOOD: 14.9 % (ref 11.5–15.5)
GLUCOSE BLD-MCNC: 146 MG/DL (ref 75–99)
GLUCOSE BLD-MCNC: 177 MG/DL (ref 75–99)
GLUCOSE BLD-MCNC: 197 MG/DL (ref 75–99)
GLUCOSE SERPL-MCNC: 183 MG/DL (ref 74–99)
HCT VFR BLD AUTO: 43.7 % (ref 39–53)
HGB BLD-MCNC: 14.3 GM/DL (ref 13–17.5)
LYMPHOCYTES # SPEC AUTO: 0.6 K/UL (ref 1–4.8)
LYMPHOCYTES NFR SPEC AUTO: 8 %
MAGNESIUM SPEC-SCNC: 1.9 MG/DL (ref 1.6–2.3)
MCH RBC QN AUTO: 28.9 PG (ref 25–35)
MCHC RBC AUTO-ENTMCNC: 32.7 G/DL (ref 31–37)
MCV RBC AUTO: 88.3 FL (ref 80–100)
MONOCYTES # BLD AUTO: 0.2 K/UL (ref 0–1)
MONOCYTES NFR BLD AUTO: 3 %
NEUTROPHILS # BLD AUTO: 6.5 K/UL (ref 1.3–7.7)
NEUTROPHILS NFR BLD AUTO: 89 %
PLATELET # BLD AUTO: 103 K/UL (ref 150–450)
POTASSIUM SERPL-SCNC: 4.7 MMOL/L (ref 3.5–5.1)
SODIUM SERPL-SCNC: 141 MMOL/L (ref 137–145)
WBC # BLD AUTO: 7.4 K/UL (ref 3.8–10.6)

## 2018-06-10 RX ADMIN — PANTOPRAZOLE SODIUM SCH MG: 40 TABLET, DELAYED RELEASE ORAL at 08:41

## 2018-06-10 RX ADMIN — MAGNESIUM SULFATE IN DEXTROSE SCH MLS/HR: 10 INJECTION, SOLUTION INTRAVENOUS at 10:59

## 2018-06-10 RX ADMIN — BUDESONIDE AND FORMOTEROL FUMARATE DIHYDRATE SCH PUFF: 160; 4.5 AEROSOL RESPIRATORY (INHALATION) at 08:53

## 2018-06-10 RX ADMIN — ATORVASTATIN CALCIUM SCH MG: 80 TABLET, FILM COATED ORAL at 08:40

## 2018-06-10 RX ADMIN — DIGOXIN SCH MCG: 250 TABLET ORAL at 08:40

## 2018-06-10 RX ADMIN — METHYLPREDNISOLONE SODIUM SUCCINATE SCH MG: 125 INJECTION, POWDER, FOR SOLUTION INTRAMUSCULAR; INTRAVENOUS at 00:37

## 2018-06-10 RX ADMIN — FUROSEMIDE SCH MG: 40 TABLET ORAL at 08:41

## 2018-06-10 RX ADMIN — MUPIROCIN SCH APPLIC: 20 OINTMENT TOPICAL at 21:47

## 2018-06-10 RX ADMIN — METOPROLOL TARTRATE SCH MG: 25 TABLET, FILM COATED ORAL at 21:46

## 2018-06-10 RX ADMIN — MUPIROCIN SCH APPLIC: 20 OINTMENT TOPICAL at 10:59

## 2018-06-10 RX ADMIN — METHYLPREDNISOLONE SODIUM SUCCINATE SCH MG: 125 INJECTION, POWDER, FOR SOLUTION INTRAMUSCULAR; INTRAVENOUS at 05:12

## 2018-06-10 RX ADMIN — IPRATROPIUM BROMIDE AND ALBUTEROL SULFATE SCH ML: .5; 3 SOLUTION RESPIRATORY (INHALATION) at 08:53

## 2018-06-10 RX ADMIN — METHYLPREDNISOLONE SODIUM SUCCINATE SCH MG: 125 INJECTION, POWDER, FOR SOLUTION INTRAMUSCULAR; INTRAVENOUS at 17:43

## 2018-06-10 RX ADMIN — METHYLPREDNISOLONE SODIUM SUCCINATE SCH MG: 125 INJECTION, POWDER, FOR SOLUTION INTRAMUSCULAR; INTRAVENOUS at 10:59

## 2018-06-10 RX ADMIN — POTASSIUM CHLORIDE SCH: 14.9 INJECTION, SOLUTION INTRAVENOUS at 14:18

## 2018-06-10 RX ADMIN — MAGNESIUM SULFATE IN DEXTROSE SCH MLS/HR: 10 INJECTION, SOLUTION INTRAVENOUS at 06:37

## 2018-06-10 RX ADMIN — BUDESONIDE AND FORMOTEROL FUMARATE DIHYDRATE SCH PUFF: 160; 4.5 AEROSOL RESPIRATORY (INHALATION) at 21:01

## 2018-06-10 RX ADMIN — IPRATROPIUM BROMIDE AND ALBUTEROL SULFATE SCH ML: .5; 3 SOLUTION RESPIRATORY (INHALATION) at 21:01

## 2018-06-10 RX ADMIN — SODIUM CHLORIDE SCH MLS/HR: 450 INJECTION, SOLUTION INTRAVENOUS at 16:12

## 2018-06-10 RX ADMIN — INSULIN ASPART SCH UNIT: 100 INJECTION, SOLUTION INTRAVENOUS; SUBCUTANEOUS at 21:49

## 2018-06-10 RX ADMIN — METOPROLOL TARTRATE SCH MG: 25 TABLET, FILM COATED ORAL at 08:40

## 2018-06-10 RX ADMIN — GABAPENTIN SCH MG: 100 CAPSULE ORAL at 08:40

## 2018-06-10 RX ADMIN — GABAPENTIN SCH MG: 100 CAPSULE ORAL at 21:47

## 2018-06-10 RX ADMIN — TAMSULOSIN HYDROCHLORIDE SCH MG: 0.4 CAPSULE ORAL at 08:40

## 2018-06-10 RX ADMIN — IPRATROPIUM BROMIDE AND ALBUTEROL SULFATE SCH ML: .5; 3 SOLUTION RESPIRATORY (INHALATION) at 12:57

## 2018-06-10 RX ADMIN — INSULIN ASPART SCH UNIT: 100 INJECTION, SOLUTION INTRAVENOUS; SUBCUTANEOUS at 17:43

## 2018-06-10 RX ADMIN — ASPIRIN 81 MG CHEWABLE TABLET SCH MG: 81 TABLET CHEWABLE at 08:40

## 2018-06-10 RX ADMIN — INSULIN ASPART SCH UNIT: 100 INJECTION, SOLUTION INTRAVENOUS; SUBCUTANEOUS at 08:45

## 2018-06-10 RX ADMIN — GABAPENTIN SCH MG: 100 CAPSULE ORAL at 16:12

## 2018-06-10 RX ADMIN — IPRATROPIUM BROMIDE AND ALBUTEROL SULFATE SCH ML: .5; 3 SOLUTION RESPIRATORY (INHALATION) at 03:55

## 2018-06-10 RX ADMIN — INSULIN ASPART SCH UNIT: 100 INJECTION, SOLUTION INTRAVENOUS; SUBCUTANEOUS at 12:19

## 2018-06-10 RX ADMIN — IPRATROPIUM BROMIDE AND ALBUTEROL SULFATE SCH ML: .5; 3 SOLUTION RESPIRATORY (INHALATION) at 16:49

## 2018-06-10 NOTE — P.PN
Subjective


Progress Note Date: 06/10/18


Principal diagnosis: 





Acute non-ST elevation myocardial infarction


his is a 78-year-old white male, familiar to my service, patient is known to 

have history of severe COPD, FEV1 is in the range of 45%, 1.27 L, patient is O2 

dependent, maintained on 3 L nasal cannula.  He was admitted yesterday to [facility] with acute chest pain, and increased shortness of breath.  

He was also noted to have elevated troponin and non-ST elevation myocardial 

infarction.  Undergone cardiac catheterization, and he was found to have severe 

triple-vessel coronary artery disease.  Patient was transferred yesterday to 

[facility], he will be seen by cardiac surgery, and 

anticipating myocardial revascularization early next week.  Based on the 

pulmonary status, patient has severe COPD, he is definitely a high surgical risk

, but no absolute contraindication to surgery.  During my evaluation, patient 

was complaining of some shortness of breath, no chest pain, no cough, no 

wheezing, no fever, no chills, no hemoptysis, no nausea, no vomiting, no 

abdominal pain.  Patient is known to have history of multiple medical problems 

including COPD, chronic atrial fibrillation, previous pacemaker implantation, 

hypertension, previous osteomyelitis involving feet, history of diabetes and 

peripheral vessel occlusive disease as well as diabetic neuropathy.  He has 

chronic systolic congestive heart failure, ejection fraction is in the range of 

45%.





Reevaluated today on 6/10/2018, patient seems to be doing relatively well.  

Asymptomatic, no cough no wheezing no shortness of breath and no chest pain.  

All his labs including CBC and basic metabolic profile and renal profile were 

noted.  Relatively unremarkable.  Patient is scheduled to undergo myocardial 

revascularization in the morning.











Objective





- Vital Signs


Vital signs: 


 Vital Signs











Temp  98.2 F   06/10/18 12:00


 


Pulse  61   06/10/18 12:00


 


Resp  19   06/10/18 12:00


 


BP  119/57   06/10/18 12:00


 


Pulse Ox  96   06/10/18 12:00








 Intake & Output











 06/09/18 06/10/18 06/10/18





 18:59 06:59 18:59


 


Intake Total 710.09 551.502 170


 


Output Total 2165 1455 330


 


Balance -1454.91 -903.498 -160


 


Weight  73.5 kg 


 


Intake:   


 


   360 170


 


    Dextrose 5%-0.45% NaCl 1, 540 260 70





    000 ml @ 100 mls/hr IV .   





    Q10H RHONDA Rx#:[id number]   


 


    Magnesium Sulfate-D5w Pmx  100 100





    1 gm In Dextrose/Water 1   





    100ml.bag @ 100 mls/hr   





    IVPB Q1H RHONDA Rx#:   





    [id number]   


 


  Intake, IV Titration 170.09 191.502 





  Amount   


 


    Heparin Sodium,Porcine/ 170.09 191.502 





    D5w Pmx 25,000 unit In   





    Dextrose/Water 1 500ml.   





    bag @ 12 UNITS/KG/HR 17.   





    52 mls/hr IV .Q24H RHONDA Rx   





    #:[id number]   


 


Output:   


 


  Urine 2165 1455 330


 


Other:   


 


  Voiding Method Indwelling Catheter Indwelling Catheter Indwelling Catheter








 ABP, PAP, CO, CI - Last Documented











Arterial Blood Pressure        124/54

















- Exam








Physical Exam: Revealed a 78-year-old white male in no distress.


Head: Atraumatic, normocephalic.


HEENT:[Neck is supple.] [No neck masses.] [No thyromegaly.] [No JVD.]


Chest: [Diminished breath sounds at the bases, no crackles or rhonchi or wheezes

, no chest wall tenderness..]


Cardiac Exam: [Normal S1 and S2, no S3 gallop, no murmur.]


Abdomen: [Soft, nontender,  no megaly, no rebound, no guarding, normal bowel 

sounds.]


Extremities: [No clubbing, no edema, no cyanosis.]  Deformities noted in toes 

bilaterally in both feet.  Diminished distal pulses bilaterally


Neurological Exam: [No focal neurologic deficit.]


Psychiatric: Normal mood affect and mental status examination.


Lymphatics: No lymphadenopathy was appreciated.








- Labs


CBC & Chem 7: 


 06/10/18 04:52





 06/10/18 04:52


Labs: 


 Abnormal Lab Results - Last 24 Hours (Table)











  06/09/18 06/10/18 06/10/18 Range/Units





  18:37 01:21 04:52 


 


Plt Count    103 L  (150-450)  k/uL


 


Lymphocytes #    0.6 L  (1.0-4.8)  k/uL


 


APTT  46.6 H  59.3 H   (22.0-30.0)  sec


 


BUN     (9-20)  mg/dL


 


Glucose     (74-99)  mg/dL


 


POC Glucose (mg/dL)     (75-99)  mg/dL


 


Crossmatch     














  06/10/18 06/10/18 06/10/18 Range/Units





  04:52 04:52 11:16 


 


Plt Count     (150-450)  k/uL


 


Lymphocytes #     (1.0-4.8)  k/uL


 


APTT   57.2 H   (22.0-30.0)  sec


 


BUN  22 H    (9-20)  mg/dL


 


Glucose  183 H    (74-99)  mg/dL


 


POC Glucose (mg/dL)     (75-99)  mg/dL


 


Crossmatch    See Detail  














  06/10/18 Range/Units





  11:50 


 


Plt Count   (150-450)  k/uL


 


Lymphocytes #   (1.0-4.8)  k/uL


 


APTT   (22.0-30.0)  sec


 


BUN   (9-20)  mg/dL


 


Glucose   (74-99)  mg/dL


 


POC Glucose (mg/dL)  197 H  (75-99)  mg/dL


 


Crossmatch   








 Microbiology - Last 24 Hours (Table)











 06/08/18 17:50 Urine Culture - Final





 Urine,Catheterized 














Assessment and Plan


Assessment: 





Impression:





1 acute non-ST elevation myocardial infarction, status post cardiac 

catheterization revealing severe triple-vessel coronary artery disease.





2 severe COPD, and chronic hypoxic respiratory failure secondary to COPD FEV1 

is in the range of 45%, 1.27 L.  Patient is definitely a high surgical risk.





3 multiple comorbidities including chronic atrial fibrillation, type 2 diabetes 

with diabetic neuropathy, peripheral vessel occlusive disease, history of 

osteomyelitis and subsequent amputations of toes.  History of hypertension, 

history of systolic congestive heart failure.





Recommendation: Continue present treatment plan, patient again is scheduled for 

surgery tomorrow.


Time with Patient: Less than 30

## 2018-06-10 NOTE — P.PN
Subjective


Progress Note Date: 06/10/18


Principal diagnosis: 





Symptomatic multivessel coronary artery disease with left main disease, 

hypertension, severe COPD with an FEV1 of 45% predicted and home oxygen use, 

chronic diastolic congestive heart failure, osteoarthritis, lumbar disc disease 

with chronic pain syndrome, history of deep vein thrombosis of his left leg, 

recurrent feet and hand ulcerations with osteomyelitis and history of MRSA 

requiring amputations, type 2 diabetes mellitus, peripheral vascular disease, 

remote history of pneumonia, non-ST elevated myocardial infarction this 

admission, family history of early onset coronary artery disease with his 

brother being diagnosed with a myocardial infarction at age 46, history of sick 

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 

hypertrophy, history of chronic persistent atrial fibrillation on home Coumadin 

therapy, remote history of smoking quit over 30 years ago, and GERD.





The patient is lying in bed with his head elevated.  He is in no acute 

distress.  He denies any complaints of pain or shortness of breath this time.  

Preoperative teaching reviewed with the patient.  His questions were answered 

to the best of my ability.





Objective





- Vital Signs


Vital signs: 


 Vital Signs











Temp  98.5 F   06/10/18 04:00


 


Pulse  83   06/10/18 09:06


 


Resp  19   06/10/18 07:00


 


BP  126/59   06/10/18 07:00


 


Pulse Ox  94 L  06/10/18 08:53








 Intake & Output











 06/09/18 06/10/18 06/10/18





 18:59 06:59 18:59


 


Intake Total 710.09 551.502 40


 


Output Total 2165 1455 130


 


Balance -1454.91 -903.498 -90


 


Weight  73.5 kg 


 


Intake:   


 


   360 40


 


    Dextrose 5%-0.45% NaCl 1, 540 260 40





    000 ml @ 100 mls/hr IV .   





    Q10H RHONDA Rx#:[id number]   


 


    Magnesium Sulfate-D5w Pmx  100 





    1 gm In Dextrose/Water 1   





    100ml.bag @ 100 mls/hr   





    IVPB Q1H RHONDA Rx#:   





    [id number]   


 


  Intake, IV Titration 170.09 191.502 





  Amount   


 


    Heparin Sodium,Porcine/ 170.09 191.502 





    D5w Pmx 25,000 unit In   





    Dextrose/Water 1 500ml.   





    bag @ 12 UNITS/KG/HR 17.   





    52 mls/hr IV .Q24H RHONDA Rx   





    #:[id number]   


 


Output:   


 


  Urine 2165 1455 130


 


Other:   


 


  Voiding Method Indwelling Catheter Indwelling Catheter 








 ABP, PAP, CO, CI - Last Documented











Arterial Blood Pressure        124/54

















- Constitutional


General appearance: Present: cooperative, no acute distress





- Respiratory


Details: 





Lung sounds with expiratory wheezes throughout, diminished to his bilateral 

bases.  Respirations are symmetrical and nonlabored.  Oxygen saturation are 97% 

on 2 L nasal cannula.  He is achieving 2000 mL on his incentive spirometry.  

Preoperative FEV1 was completed which demonstrated 45% of predicted value.





- Cardiovascular


Details: 





Irregular rhythm with controlled rate.  S1 and S2 present, negative for S3, 

gallop or murmur.  Bedside telemetry showing atrial fibrillation heart rate 80.

  Knee-high sequential compression devices in place to his bilateral lower 

extremities.





- Gastrointestinal


Gastrointestinal Comment(s): 





Abdomen is soft, nontender and nondistended.  Active bowel sounds all 4 

abdominal quadrants.  Tolerating oral intake.





- Genitourinary


Genitourinary Comment(s): 





Toth catheter for accurate I&O.  Draining clear joy urine, 980 mL output in 

the last 8 hours.





- Neurologic


Neurologic: Present: CNII-XII intact, focal deficits





- Musculoskeletal


Musculoskeletal Comment(s): 





Ambulating with assistance from a walker.


Musculoskeletal: Present: generalized weakness, strength equal bilaterally





- Psychiatric


Psychiatric: Present: A&O x's 3, appropriate affect, intact judgment & insight





- Allied health notes


Allied health notes reviewed: nursing





- Labs


CBC & Chem 7: 


 06/10/18 04:52





 06/10/18 04:52


Labs: 


 Abnormal Lab Results - Last 24 Hours (Table)











  06/09/18 06/09/18 06/09/18 Range/Units





  10:06 10:06 18:37 


 


Plt Count     (150-450)  k/uL


 


Lymphocytes #     (1.0-4.8)  k/uL


 


APTT   31.0 H  46.6 H  (22.0-30.0)  sec


 


BUN     (9-20)  mg/dL


 


Glucose     (74-99)  mg/dL


 


Troponin I  0.238 H*    (0.000-0.034)  ng/mL














  06/10/18 06/10/18 06/10/18 Range/Units





  01:21 04:52 04:52 


 


Plt Count   103 L   (150-450)  k/uL


 


Lymphocytes #   0.6 L   (1.0-4.8)  k/uL


 


APTT  59.3 H    (22.0-30.0)  sec


 


BUN    22 H  (9-20)  mg/dL


 


Glucose    183 H  (74-99)  mg/dL


 


Troponin I     (0.000-0.034)  ng/mL














  06/10/18 Range/Units





  04:52 


 


Plt Count   (150-450)  k/uL


 


Lymphocytes #   (1.0-4.8)  k/uL


 


APTT  57.2 H  (22.0-30.0)  sec


 


BUN   (9-20)  mg/dL


 


Glucose   (74-99)  mg/dL


 


Troponin I   (0.000-0.034)  ng/mL








 Microbiology - Last 24 Hours (Table)











 06/08/18 17:50 Urine Culture - Final





 Urine,Catheterized 














Assessment and Plan


(1) Hypertension


Current Visit: Yes   Status: Acute   Code(s): I10 - ESSENTIAL (PRIMARY) 

HYPERTENSION   SNOMED Code(s): 20006456


   





(2) Non-ST elevated myocardial infarction


Current Visit: Yes   Status: Acute   Code(s): I21.4 - NON-ST ELEVATION (NSTEMI) 

MYOCARDIAL INFARCTION   SNOMED Code(s): 084344729


   





(3) COPD (chronic obstructive pulmonary disease)


Current Visit: Yes   Status: Acute   Code(s): J44.9 - CHRONIC OBSTRUCTIVE 

PULMONARY DISEASE, UNSPECIFIED   SNOMED Code(s): 66877864


   





(4) Lumbar disc disease


Current Visit: Yes   Status: Acute   Code(s): M51.9 - UNSP THORACIC, THORACOLUM 

AND LUMBOSACR INTVRT DISC DISORDER   SNOMED Code(s): 828550215


   





(5) Peripheral vascular disease


Current Visit: Yes   Status: Acute   Code(s): I73.9 - PERIPHERAL VASCULAR 

DISEASE, UNSPECIFIED   SNOMED Code(s): 523371994


   





(6) Diabetes mellitus type 2 in nonobese


Current Visit: Yes   Status: Acute   Code(s): E11.9 - TYPE 2 DIABETES MELLITUS 

WITHOUT COMPLICATIONS   SNOMED Code(s): 105958077


   





(7) History of smoking


Current Visit: Yes   Status: Acute   Code(s): Z87.891 - PERSONAL HISTORY OF 

NICOTINE DEPENDENCE   SNOMED Code(s): 05819141468835367


   





(8) Chronic diastolic CHF (congestive heart failure)


Current Visit: Yes   Status: Acute   Code(s): I50.32 - CHRONIC DIASTOLIC (

CONGESTIVE) HEART FAILURE   SNOMED Code(s): 203569846


   





(9) Chronic pain syndrome


Current Visit: Yes   Status: Acute   Code(s): G89.4 - CHRONIC PAIN SYNDROME   

SNOMED Code(s): 372095675


   





(10) History of MRSA infection


Current Visit: Yes   Status: Acute   Code(s): Z86.14 - PERSONAL HISTORY OF 

METHICILLIN RESIS STAPH INFECTION   SNOMED Code(s): 516076866


   





(11) Chronic atrial fibrillation


Current Visit: Yes   Status: Acute   Code(s): I48.2 - CHRONIC ATRIAL 

FIBRILLATION   SNOMED Code(s): 846925782


   





(12) GERD (gastroesophageal reflux disease)


Current Visit: Yes   Status: Acute   Code(s): K21.9 - GASTRO-ESOPHAGEAL REFLUX 

DISEASE WITHOUT ESOPHAGITIS   SNOMED Code(s): 468575486


   





(13) BPH (benign prostatic hyperplasia)


Current Visit: Yes   Status: Acute   Code(s): N40.0 - BENIGN PROSTATIC 

HYPERPLASIA WITHOUT LOWER URINRY TRACT SYMP   SNOMED Code(s): 014797017


   





(14) History of sick sinus syndrome


Current Visit: Yes   Status: Acute   Code(s): Z86.79 - PERSONAL HISTORY OF 

OTHER DISEASES OF THE CIRCULATORY SYSTEM   SNOMED Code(s): 053904467473837


   





(15) History of permanent cardiac pacemaker placement


Current Visit: Yes   Status: Acute   Code(s): Z95.0 - PRESENCE OF CARDIAC 

PACEMAKER   SNOMED Code(s): 594626924


   





(16) Family history of coronary artery disease in brother


Current Visit: Yes   Status: Acute   Code(s): Z82.49 - FAMILY HX OF ISCHEM 

HEART DIS AND OTH DIS OF THE CIRC SYS   SNOMED Code(s): 883087095


   





(17) History of Coumadin therapy


Current Visit: Yes   Status: Acute   Code(s): Z92.29 - PERSONAL HISTORY OF 

OTHER DRUG THERAPY   SNOMED Code(s): 070879877


   


Plan: 





1.  Dr. Wolfe met with the patient and his grandson.  He discussed the 

treatment options for his coronary artery disease.  The patient wishes to 

proceed with myocardial revascularization surgery.  He will be treated be 

scheduled for myocardial revascularization surgery with exclusion of his left 

atrial appendage, intraoperative transesophageal echocardiogram, epi-aortic 

ultrasound and graft flow measurement using the [employer] sytem for Monday, 06/11 /2018.


2.  Encourage use of his incentive spirometry every hour while awake.


3.  Continue to hold Coumadin.


4.  Continue heparin, aspirin, statin, and beta blocker.


5.  Pulmonary recommendations per Dr. Del Cid.


6.  Nothing by mouth after midnight.


7.  Obtain consent for myocardial revascularization surgery for 06/10/2018.


8.  We will send a HIT assay due to his platelet count of 103.


9.  We will draw a preoperative room air arterial blood gas after the arterial 

line has been placed by the CRNA tomorrow morning 06/11/2018.


10.  More recommendations to follow based on the patient's clinical course.


Time with Patient: Greater than 30

## 2018-06-10 NOTE — PN
PROGRESS NOTE



78-year-old gentleman that is admitted to hospital with unstable angina, underwent

cardiac catheterization and had been found to have severe triple-vessel disease and has

underlying moderate LV systolic dysfunction.  The patient is chest pain-free this

morning, remains hemodynamically stable.  Heart rate is normal.



On exam, vital signs are stable.  There is no jugular venous distention.  Chest exam

reveals good air entry bilaterally.  Heart exam reveals first and second heart sounds.

No gallop.  Exam of extremities did not reveal any edema.



LABS:

Show a hemoglobin of 14.3, BUN is 22, creatinine is 0.7.



ASSESSMENT:

Unstable angina status post catheterization and awaiting bypass surgery.  The patient

is stable clinically and I will leave him in the ICU.





MMODL / IJN: [id number] / Job#: [id number]

## 2018-06-10 NOTE — P.PN
Progress Note - Text





The patient is a 78-year-old gentleman who 2 days ago was transferred from [facility] after he presented there with a non-ST segment elevated 

myocardial infarction and found to have severe triple vessel disease by 

cardiology on catheterization there.


Patient to has been seen by surgery, cardiology and pulmonary medicine.  

Anticipation for coronary artery bypass surgery.





Patient does have additional risk factors to include his underlying COPD and 

smoking history.  Chronic diastolic/systolic congestive heart failure.  Chronic 

pain from osteoarthritis of the lumbar spine.  Underlying diabetes and diabetic 

neuropathy.  BPH, gastroesophageal reflux.  Also chronic atrial fibrillation on 

Coumadin.  Also patient has had recurrent hand and feet ulcerations which have 

required amputations in the past.





This morning he is sitting up in bed.  Alert.  He denies any chest pain or 

unusual shortness of breath.


Last vital signs reveal blood pressure 113/50 with respiratory rate of 15 and 

pulse of 86.  He is 95% saturated on 2 L.


Lungs are generally clear although diminished.  Heart tones were mildly 

irregular with controlled rate.


Abdomen nontender.  No unusual edema.  No focal neurological changes.





Laboratory


Account 7.4 with a hemoglobin 14.3 and a platelet count of 103.


On His PTT Is 57.2


Potassium Is 4.7.  GFR Is Greater Than 90.  Blood Sugar Was 183.


Urine Cultures No Growth after 18 Hours.





Impressions and Plans


As dictated above.  Patient Is anticipating possible Coronary Artery Bypass 

Surgery tomorrow.





Continue to monitor platelet counts.


Dr. Borden on-call for me today if any medical concerns.

## 2018-06-11 LAB
ALBUMIN SERPL-MCNC: 2.9 G/DL (ref 3.5–5)
ALP SERPL-CCNC: 32 U/L (ref 38–126)
ALT SERPL-CCNC: 29 U/L (ref 21–72)
ANION GAP SERPL CALC-SCNC: 12 MMOL/L
ANION GAP SERPL CALC-SCNC: 8 MMOL/L
ANION GAP SERPL CALC-SCNC: 8 MMOL/L
APTT BLD: 25.9 SEC (ref 22–30)
APTT BLD: 47.6 SEC (ref 22–30)
AST SERPL-CCNC: 46 U/L (ref 17–59)
BASOPHILS # BLD AUTO: 0 K/UL (ref 0–0.2)
BASOPHILS NFR BLD AUTO: 0 %
BUN SERPL-SCNC: 24 MG/DL (ref 9–20)
BUN SERPL-SCNC: 25 MG/DL (ref 9–20)
BUN SERPL-SCNC: 28 MG/DL (ref 9–20)
CALCIUM SPEC-MCNC: 7.9 MG/DL (ref 8.4–10.2)
CALCIUM SPEC-MCNC: 8.8 MG/DL (ref 8.4–10.2)
CALCIUM SPEC-MCNC: 8.9 MG/DL (ref 8.4–10.2)
CHLORIDE SERPL-SCNC: 100 MMOL/L (ref 98–107)
CHLORIDE SERPL-SCNC: 102 MMOL/L (ref 98–107)
CHLORIDE SERPL-SCNC: 103 MMOL/L (ref 98–107)
CO2 BLDA-SCNC: 28 MMOL/L (ref 19–24)
CO2 BLDA-SCNC: 29 MMOL/L (ref 19–24)
CO2 BLDA-SCNC: 30 MMOL/L (ref 19–24)
CO2 BLDA-SCNC: 30 MMOL/L (ref 19–24)
CO2 BLDA-SCNC: 31 MMOL/L (ref 19–24)
CO2 BLDA-SCNC: 31 MMOL/L (ref 19–24)
CO2 BLDA-SCNC: 32 MMOL/L (ref 19–24)
CO2 SERPL-SCNC: 28 MMOL/L (ref 22–30)
EOSINOPHIL # BLD AUTO: 0 K/UL (ref 0–0.7)
EOSINOPHIL NFR BLD AUTO: 0 %
ERYTHROCYTE [DISTWIDTH] IN BLOOD BY AUTOMATED COUNT: 3.24 M/UL (ref 4.3–5.9)
ERYTHROCYTE [DISTWIDTH] IN BLOOD BY AUTOMATED COUNT: 3.26 M/UL (ref 4.3–5.9)
ERYTHROCYTE [DISTWIDTH] IN BLOOD BY AUTOMATED COUNT: 3.39 M/UL (ref 4.3–5.9)
ERYTHROCYTE [DISTWIDTH] IN BLOOD BY AUTOMATED COUNT: 4.8 M/UL (ref 4.3–5.9)
ERYTHROCYTE [DISTWIDTH] IN BLOOD: 14.7 % (ref 11.5–15.5)
ERYTHROCYTE [DISTWIDTH] IN BLOOD: 14.7 % (ref 11.5–15.5)
ERYTHROCYTE [DISTWIDTH] IN BLOOD: 15 % (ref 11.5–15.5)
ERYTHROCYTE [DISTWIDTH] IN BLOOD: 15.1 % (ref 11.5–15.5)
GLUCOSE BLD-MCNC: 122 MG/DL (ref 75–99)
GLUCOSE BLD-MCNC: 131 MG/DL (ref 75–99)
GLUCOSE BLD-MCNC: 134 MG/DL (ref 75–99)
GLUCOSE BLD-MCNC: 152 MG/DL (ref 75–99)
GLUCOSE BLD-MCNC: 153 MG/DL (ref 75–99)
GLUCOSE BLD-MCNC: 161 MG/DL (ref 75–99)
GLUCOSE BLD-MCNC: 74 MG/DL (ref 75–99)
GLUCOSE BLD-MCNC: 85 MG/DL (ref 75–99)
GLUCOSE BLD-MCNC: 92 MG/DL (ref 75–99)
GLUCOSE SERPL-MCNC: 110 MG/DL (ref 74–99)
GLUCOSE SERPL-MCNC: 146 MG/DL (ref 74–99)
GLUCOSE SERPL-MCNC: 191 MG/DL (ref 74–99)
HCO3 BLDA-SCNC: 27 MMOL/L (ref 21–25)
HCO3 BLDA-SCNC: 27 MMOL/L (ref 21–25)
HCO3 BLDA-SCNC: 28 MMOL/L (ref 21–25)
HCO3 BLDA-SCNC: 29 MMOL/L (ref 21–25)
HCO3 BLDA-SCNC: 30 MMOL/L (ref 21–25)
HCT VFR BLD AUTO: 28.5 % (ref 39–53)
HCT VFR BLD AUTO: 28.7 % (ref 39–53)
HCT VFR BLD AUTO: 30 % (ref 39–53)
HCT VFR BLD AUTO: 42.1 % (ref 39–53)
HGB BLD-MCNC: 14 GM/DL (ref 13–17.5)
HGB BLD-MCNC: 9.4 GM/DL (ref 13–17.5)
HGB BLD-MCNC: 9.4 GM/DL (ref 13–17.5)
HGB BLD-MCNC: 9.8 GM/DL (ref 13–17.5)
INR PPP: 1.3 (ref ?–1.2)
INR PPP: 1.5 (ref ?–1.2)
LYMPHOCYTES # SPEC AUTO: 0.6 K/UL (ref 1–4.8)
LYMPHOCYTES # SPEC AUTO: 0.7 K/UL (ref 1–4.8)
LYMPHOCYTES # SPEC AUTO: 0.8 K/UL (ref 1–4.8)
LYMPHOCYTES # SPEC AUTO: 0.9 K/UL (ref 1–4.8)
LYMPHOCYTES NFR SPEC AUTO: 3 %
LYMPHOCYTES NFR SPEC AUTO: 4 %
LYMPHOCYTES NFR SPEC AUTO: 4 %
LYMPHOCYTES NFR SPEC AUTO: 6 %
MAGNESIUM SPEC-SCNC: 2.3 MG/DL (ref 1.6–2.3)
MAGNESIUM SPEC-SCNC: 2.9 MG/DL (ref 1.6–2.3)
MCH RBC QN AUTO: 28.8 PG (ref 25–35)
MCH RBC QN AUTO: 28.9 PG (ref 25–35)
MCH RBC QN AUTO: 29.1 PG (ref 25–35)
MCH RBC QN AUTO: 29.3 PG (ref 25–35)
MCHC RBC AUTO-ENTMCNC: 32.7 G/DL (ref 31–37)
MCHC RBC AUTO-ENTMCNC: 32.7 G/DL (ref 31–37)
MCHC RBC AUTO-ENTMCNC: 33.1 G/DL (ref 31–37)
MCHC RBC AUTO-ENTMCNC: 33.4 G/DL (ref 31–37)
MCV RBC AUTO: 87.7 FL (ref 80–100)
MCV RBC AUTO: 88 FL (ref 80–100)
MCV RBC AUTO: 88.1 FL (ref 80–100)
MCV RBC AUTO: 88.6 FL (ref 80–100)
MONOCYTES # BLD AUTO: 0.3 K/UL (ref 0–1)
MONOCYTES # BLD AUTO: 1.1 K/UL (ref 0–1)
MONOCYTES # BLD AUTO: 1.2 K/UL (ref 0–1)
MONOCYTES # BLD AUTO: 1.8 K/UL (ref 0–1)
MONOCYTES NFR BLD AUTO: 3 %
MONOCYTES NFR BLD AUTO: 5 %
MONOCYTES NFR BLD AUTO: 6 %
MONOCYTES NFR BLD AUTO: 9 %
NEUTROPHILS # BLD AUTO: 10.6 K/UL (ref 1.3–7.7)
NEUTROPHILS # BLD AUTO: 16.7 K/UL (ref 1.3–7.7)
NEUTROPHILS # BLD AUTO: 17.9 K/UL (ref 1.3–7.7)
NEUTROPHILS # BLD AUTO: 21.6 K/UL (ref 1.3–7.7)
NEUTROPHILS NFR BLD AUTO: 85 %
NEUTROPHILS NFR BLD AUTO: 90 %
NEUTROPHILS NFR BLD AUTO: 91 %
NEUTROPHILS NFR BLD AUTO: 91 %
PCO2 BLDA: 40 MMHG (ref 35–45)
PCO2 BLDA: 41 MMHG (ref 35–45)
PCO2 BLDA: 41 MMHG (ref 35–45)
PCO2 BLDA: 43 MMHG (ref 35–45)
PCO2 BLDA: 44 MMHG (ref 35–45)
PCO2 BLDA: 46 MMHG (ref 35–45)
PCO2 BLDA: 47 MMHG (ref 35–45)
PCO2 BLDA: 48 MMHG (ref 35–45)
PCO2 BLDA: 54 MMHG (ref 35–45)
PCO2 BLDA: 58 MMHG (ref 35–45)
PH BLDA: 7.31 [PH] (ref 7.35–7.45)
PH BLDA: 7.36 [PH] (ref 7.35–7.45)
PH BLDA: 7.38 [PH] (ref 7.35–7.45)
PH BLDA: 7.38 [PH] (ref 7.35–7.45)
PH BLDA: 7.39 [PH] (ref 7.35–7.45)
PH BLDA: 7.4 [PH] (ref 7.35–7.45)
PH BLDA: 7.43 [PH] (ref 7.35–7.45)
PH BLDA: 7.45 [PH] (ref 7.35–7.45)
PH BLDA: 7.45 [PH] (ref 7.35–7.45)
PH BLDA: 7.46 [PH] (ref 7.35–7.45)
PLATELET # BLD AUTO: 115 K/UL (ref 150–450)
PLATELET # BLD AUTO: 126 K/UL (ref 150–450)
PLATELET # BLD AUTO: 131 K/UL (ref 150–450)
PLATELET # BLD AUTO: 94 K/UL (ref 150–450)
PO2 BLDA: 209 MMHG (ref 83–108)
PO2 BLDA: 214 MMHG (ref 83–108)
PO2 BLDA: 231 MMHG (ref 83–108)
PO2 BLDA: 259 MMHG (ref 83–108)
PO2 BLDA: 276 MMHG (ref 83–108)
PO2 BLDA: 309 MMHG (ref 83–108)
PO2 BLDA: 62 MMHG (ref 83–108)
PO2 BLDA: 73 MMHG (ref 83–108)
PO2 BLDA: >400 MMHG (ref 83–108)
PO2 BLDA: >420 MMHG (ref 83–108)
POTASSIUM BLDA-SCNC: 4.1 MMOL/L (ref 3.4–4.5)
POTASSIUM BLDA-SCNC: 4.1 MMOL/L (ref 3.4–4.5)
POTASSIUM BLDA-SCNC: 4.2 MMOL/L (ref 3.4–4.5)
POTASSIUM BLDA-SCNC: 4.4 MMOL/L (ref 3.4–4.5)
POTASSIUM BLDA-SCNC: 4.9 MMOL/L (ref 3.4–4.5)
POTASSIUM BLDA-SCNC: 5.3 MMOL/L (ref 3.4–4.5)
POTASSIUM BLDA-SCNC: 5.6 MMOL/L (ref 3.4–4.5)
POTASSIUM SERPL-SCNC: 4.2 MMOL/L (ref 3.5–5.1)
POTASSIUM SERPL-SCNC: 4.4 MMOL/L (ref 3.5–5.1)
POTASSIUM SERPL-SCNC: 5 MMOL/L (ref 3.5–5.1)
PROT SERPL-MCNC: 4.5 G/DL (ref 6.3–8.2)
PT BLD: 12 SEC (ref 9–12)
PT BLD: 13.9 SEC (ref 9–12)
SODIUM BLDA-SCNC: 132 MMOL/L (ref 135–146)
SODIUM BLDA-SCNC: 133 MMOL/L (ref 135–146)
SODIUM BLDA-SCNC: 134 MMOL/L (ref 135–146)
SODIUM BLDA-SCNC: 134 MMOL/L (ref 135–146)
SODIUM BLDA-SCNC: 138 MMOL/L (ref 135–146)
SODIUM SERPL-SCNC: 138 MMOL/L (ref 137–145)
SODIUM SERPL-SCNC: 139 MMOL/L (ref 137–145)
SODIUM SERPL-SCNC: 140 MMOL/L (ref 137–145)
WBC # BLD AUTO: 11.7 K/UL (ref 3.8–10.6)
WBC # BLD AUTO: 19.8 K/UL (ref 3.8–10.6)
WBC # BLD AUTO: 19.9 K/UL (ref 3.8–10.6)
WBC # BLD AUTO: 23.7 K/UL (ref 3.8–10.6)

## 2018-06-11 PROCEDURE — 4A0305C MEASUREMENT OF ARTERIAL FLOW, CORONARY, OPEN APPROACH: ICD-10-PCS

## 2018-06-11 PROCEDURE — B246ZZ4 ULTRASONOGRAPHY OF RIGHT AND LEFT HEART, TRANSESOPHAGEAL: ICD-10-PCS

## 2018-06-11 PROCEDURE — 02100Z9 BYPASS CORONARY ARTERY, ONE ARTERY FROM LEFT INTERNAL MAMMARY, OPEN APPROACH: ICD-10-PCS

## 2018-06-11 PROCEDURE — 5A1221Z PERFORMANCE OF CARDIAC OUTPUT, CONTINUOUS: ICD-10-PCS

## 2018-06-11 PROCEDURE — 021209W BYPASS CORONARY ARTERY, THREE ARTERIES FROM AORTA WITH AUTOLOGOUS VENOUS TISSUE, OPEN APPROACH: ICD-10-PCS

## 2018-06-11 PROCEDURE — 06BP4ZZ EXCISION OF RIGHT SAPHENOUS VEIN, PERCUTANEOUS ENDOSCOPIC APPROACH: ICD-10-PCS

## 2018-06-11 PROCEDURE — 02L70CK OCCLUSION OF LEFT ATRIAL APPENDAGE WITH EXTRALUMINAL DEVICE, OPEN APPROACH: ICD-10-PCS

## 2018-06-11 RX ADMIN — METHYLPREDNISOLONE SODIUM SUCCINATE SCH MG: 125 INJECTION, POWDER, FOR SOLUTION INTRAMUSCULAR; INTRAVENOUS at 23:52

## 2018-06-11 RX ADMIN — POTASSIUM CHLORIDE SCH: 14.9 INJECTION, SOLUTION INTRAVENOUS at 16:53

## 2018-06-11 RX ADMIN — IPRATROPIUM BROMIDE AND ALBUTEROL SULFATE SCH: .5; 3 SOLUTION RESPIRATORY (INHALATION) at 00:14

## 2018-06-11 RX ADMIN — IPRATROPIUM BROMIDE AND ALBUTEROL SULFATE SCH: .5; 3 SOLUTION RESPIRATORY (INHALATION) at 12:27

## 2018-06-11 RX ADMIN — IPRATROPIUM BROMIDE AND ALBUTEROL SULFATE SCH ML: .5; 3 SOLUTION RESPIRATORY (INHALATION) at 15:48

## 2018-06-11 RX ADMIN — IPRATROPIUM BROMIDE AND ALBUTEROL SULFATE SCH ML: .5; 3 SOLUTION RESPIRATORY (INHALATION) at 19:30

## 2018-06-11 RX ADMIN — CLEVIPIDINE SCH MLS/HR: 0.5 EMULSION INTRAVENOUS at 18:30

## 2018-06-11 RX ADMIN — METHYLPREDNISOLONE SODIUM SUCCINATE SCH MG: 125 INJECTION, POWDER, FOR SOLUTION INTRAMUSCULAR; INTRAVENOUS at 16:56

## 2018-06-11 RX ADMIN — CEFAZOLIN SCH GM: 10 INJECTION, POWDER, FOR SOLUTION INTRAVENOUS at 16:56

## 2018-06-11 RX ADMIN — IPRATROPIUM BROMIDE AND ALBUTEROL SULFATE SCH: .5; 3 SOLUTION RESPIRATORY (INHALATION) at 03:58

## 2018-06-11 RX ADMIN — IPRATROPIUM BROMIDE AND ALBUTEROL SULFATE SCH ML: .5; 3 SOLUTION RESPIRATORY (INHALATION) at 04:53

## 2018-06-11 RX ADMIN — POTASSIUM CHLORIDE SCH MLS/HR: 14.9 INJECTION, SOLUTION INTRAVENOUS at 16:54

## 2018-06-11 RX ADMIN — CLEVIPIDINE SCH: 0.5 EMULSION INTRAVENOUS at 16:55

## 2018-06-11 RX ADMIN — SODIUM CHLORIDE, PRESERVATIVE FREE SCH ML: 5 INJECTION INTRAVENOUS at 23:15

## 2018-06-11 RX ADMIN — BUDESONIDE AND FORMOTEROL FUMARATE DIHYDRATE SCH: 160; 4.5 AEROSOL RESPIRATORY (INHALATION) at 08:00

## 2018-06-11 RX ADMIN — HEPARIN SODIUM SCH UNIT: 5000 INJECTION, SOLUTION INTRAVENOUS; SUBCUTANEOUS at 23:16

## 2018-06-11 RX ADMIN — MUPIROCIN SCH APPLIC: 20 OINTMENT TOPICAL at 20:33

## 2018-06-11 RX ADMIN — METHYLPREDNISOLONE SODIUM SUCCINATE SCH MG: 125 INJECTION, POWDER, FOR SOLUTION INTRAMUSCULAR; INTRAVENOUS at 05:23

## 2018-06-11 RX ADMIN — ACETAMINOPHEN SCH MLS/HR: 10 INJECTION, SOLUTION INTRAVENOUS at 23:51

## 2018-06-11 RX ADMIN — METOPROLOL TARTRATE SCH MG: 25 TABLET, FILM COATED ORAL at 20:32

## 2018-06-11 RX ADMIN — IPRATROPIUM BROMIDE AND ALBUTEROL SULFATE SCH: .5; 3 SOLUTION RESPIRATORY (INHALATION) at 19:55

## 2018-06-11 RX ADMIN — ACETAMINOPHEN SCH MLS/HR: 10 INJECTION, SOLUTION INTRAVENOUS at 16:57

## 2018-06-11 RX ADMIN — CEFAZOLIN SCH GM: 10 INJECTION, POWDER, FOR SOLUTION INTRAVENOUS at 23:51

## 2018-06-11 RX ADMIN — METHYLPREDNISOLONE SODIUM SUCCINATE SCH MG: 125 INJECTION, POWDER, FOR SOLUTION INTRAMUSCULAR; INTRAVENOUS at 00:10

## 2018-06-11 RX ADMIN — METOPROLOL TARTRATE SCH MG: 25 TABLET, FILM COATED ORAL at 06:01

## 2018-06-11 NOTE — P.PN
Progress Note - Text





The patient is a 78-year-old gentleman with triple vessel coronary artery 

disease that today underwent quadruple coronary artery bypass grafting along 

with exclusion of the left atrial appendage.  He is presently on the ventilator 

in the intensive care unit.  In discussing with the nursing staff apparently he 

is just beginning to come off of his anesthesia.  Overall though he appears 

comfortable on the ventilator.





Vital signs reveal a respiratory rate of 16 on the ventilator.  Pulse of 84.  

Blood pressure 107/52.  A pulmonary artery pressures 32/17.  CVP of 8.  O2 

saturation of 99 on an FiO2 of 40%.


Lungs generally some clear anteriorly at this time.  Heart tones are somewhat 

distant.





Laboratory


White count is elevated at 19.8 with a hemoglobin 19.4 and a platelet count of 

94.


Blood sugar was 92.





Chest x-ray demonstrates new tubes and lines apparently in satisfactory 

position.


Persistence of cardiomegaly and chronic parenchymal changes.  But no new 

infiltrate seen.





Impressions and plans


Overall this 78-year-old gentleman who appears to be doing generally 

satisfactory after coronary artery bypass surgery.


He presently remains vent dependent.


He does have underlying COPD and previous smoking history.  Chronic diastolic/

systolic congestive heart failure.  Diabetes and diabetic neuropathy.


Osteoarthritis of the lumbar spine.  History of chronic atrial fibrillation for 

which in the past he has been on Coumadin.  BPH.  Gastroesophageal reflux.


He will presently continue the intensive care unit with follow-up care from 

pulmonary medicine, cardiology and thoracic surgery.

## 2018-06-11 NOTE — P.PN
Subjective


Progress Note Date: 06/11/18


Principal diagnosis: 





Coronary artery disease





Progress note dated 06/11/2018





78-year-old male with a history of non-ST segment elevation myocardial 

infarction, status post cardiac catheterization revealing severe triple-vessel 

CAD.  In addition, he is quite severe COPD, with an FEV1 in the range of 45% of 

predicted.  He has multiple additional comorbidities including chronic atrial 

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 

systolic heart failure.  The patient surgery is to be done today.  The patient 

is on O2 3 L nasal cannula and has a dextrose half-normal saline IV at [facility].  He 

was on a heparin drip but obviously that was turned off prior to surgery.  

Other than that, the patient is doing reasonably well.  He certainly is at high 

risk.  This has been documented by my partner.





Objective





- Vital Signs


Vital signs: 


 Vital Signs











Temp  98.0 F   06/11/18 04:43


 


Pulse  78   06/11/18 06:00


 


Resp  18   06/11/18 06:00


 


BP  123/56   06/11/18 06:00


 


Pulse Ox  92 L  06/11/18 06:00








 Intake & Output











 06/10/18 06/11/18 06/11/18





 18:59 06:59 18:59


 


Intake Total 1328 110 


 


Output Total 1380 1275 


 


Balance -52 -1165 


 


Weight  73.2 kg 


 


Intake:   


 


   110 


 


    Dextrose 5%-0.45% NaCl 1, 140 110 





    000 ml @ 10 mls/hr IV .   





    Q24H RHONDA Rx#:[id number]   


 


    Magnesium Sulfate-D5w Pmx 100  





    1 gm In Dextrose/Water 1   





    100ml.bag @ 100 mls/hr   





    IVPB Q1H RHONDA Rx#:   





    [id number]   


 


  Intake, IV Titration 438  





  Amount   


 


    Heparin Sodium,Porcine/ 438  





    D5w Pmx 25,000 unit In   





    Dextrose/Water 1 500ml.   





    bag @ 12 UNITS/KG/HR 17.   





    52 mls/hr IV .Q24H RHONDA Rx   





    #:[id number]   


 


  Oral 650  


 


Output:   


 


  Urine 1380 1275 


 


Other:   


 


  Voiding Method Indwelling Catheter Indwelling Catheter 








 ABP, PAP, CO, CI - Last Documented











Arterial Blood Pressure        124/54

















- Exam





No acute distress, oriented 3.





HEENT examination is grossly unremarkable.  Mucous membranes are moist.  No 

oral lesions.





Neck supple.  Full range of motion.  No adenopathy thyromegaly or neck vein 

distention.





Cardiovascular examination reveals regular rhythm rate.  S1-S2 normal.  No S3 

or S4.  No discernible murmur noted.





Lungs reveal clear breath sounds.  Her sounds are equal bilaterally.  No 

adventitious lung sounds including wheezes rhonchi or crackles.





Abdomen soft bowel sounds are heard.  No masses or tenderness.





Extremities are intact.  No cyanosis clubbing or edema.





Skin is without rash or lesion.





Neurologic examination is brief but nonfocal.





- Labs


CBC & Chem 7: 


 06/11/18 04:09





 06/11/18 04:09


Labs: 


 Abnormal Lab Results - Last 24 Hours (Table)











  06/10/18 06/10/18 06/10/18 Range/Units





  11:16 11:50 17:06 


 


WBC     (3.8-10.6)  k/uL


 


Plt Count     (150-450)  k/uL


 


Neutrophils #     (1.3-7.7)  k/uL


 


Lymphocytes #     (1.0-4.8)  k/uL


 


INR     (<1.2)  


 


APTT     (22.0-30.0)  sec


 


ABG pO2     ()  mmHg


 


ABG HCO3     (21-25)  mmol/L


 


ABG O2 Saturation     (94-97)  %


 


BUN     (9-20)  mg/dL


 


Glucose     (74-99)  mg/dL


 


POC Glucose (mg/dL)   197 H  146 H  (75-99)  mg/dL


 


Crossmatch  See Detail    














  06/10/18 06/11/18 06/11/18 Range/Units





  21:48 04:09 04:09 


 


WBC   11.7 H   (3.8-10.6)  k/uL


 


Plt Count   131 L   (150-450)  k/uL


 


Neutrophils #   10.6 H   (1.3-7.7)  k/uL


 


Lymphocytes #   0.7 L   (1.0-4.8)  k/uL


 


INR     (<1.2)  


 


APTT     (22.0-30.0)  sec


 


ABG pO2     ()  mmHg


 


ABG HCO3     (21-25)  mmol/L


 


ABG O2 Saturation     (94-97)  %


 


BUN    25 H  (9-20)  mg/dL


 


Glucose    191 H  (74-99)  mg/dL


 


POC Glucose (mg/dL)  177 H    (75-99)  mg/dL


 


Crossmatch     














  06/11/18 06/11/18 Range/Units





  05:42 07:30 


 


WBC    (3.8-10.6)  k/uL


 


Plt Count    (150-450)  k/uL


 


Neutrophils #    (1.3-7.7)  k/uL


 


Lymphocytes #    (1.0-4.8)  k/uL


 


INR  1.3 H   (<1.2)  


 


APTT  47.6 H   (22.0-30.0)  sec


 


ABG pO2   62 L  ()  mmHg


 


ABG HCO3   29 H  (21-25)  mmol/L


 


ABG O2 Saturation   93.2 L  (94-97)  %


 


BUN    (9-20)  mg/dL


 


Glucose    (74-99)  mg/dL


 


POC Glucose (mg/dL)    (75-99)  mg/dL


 


Crossmatch    








 Microbiology - Last 24 Hours (Table)











 06/11/18 04:56 Sputum Culture - Preliminary





 Sputum 


 


 06/08/18 21:10 Nasal Screen MRSA/MSSA ([facility]) - Final





 Nasal Swab    Staphylococcus aureus,Not MRSA














Assessment and Plan


Assessment: 





Assessment





Acute non-ST segment elevation myocardial infarction, secondary to severe triple

-vessel CAD





Severe COPD, stage III disease, with an FEV1 that is 45% of predicted





Chronic hypoxemic respiratory failure





Chronic atrial fibrillation





Type 2 diabetes mellitus





Diabetic neuropathy





PVOD





Osteomyelitis





History of hypertension





History of systolic heart failure.








Plan: 





Plan dated 06/11/2018





The patient's vent will be management the patient comes back from the operating 

room.  We'll make sure the patient's on updrafts every 4 around-the-clock.  The 

patient's will be assessed carefully.  We'll see if we can get the patient 

extubated as quickly as possible.  Given his multiple comorbidities and his 

severe COPD, that may precipitate a bit of a problem.  Additional 

recommendations and suggestions are forthcoming.  Post extubation, use of 

incentive spirometry and bronchodilators will be very important and continue 

deep breathing coughing and clearing his secretions be beneficial as well.  





Critical care time 33 minutes


Time with Patient: Greater than 30

## 2018-06-11 NOTE — OP
********** PLEASE SEE THE COMPLETED REPORT WITH THE SAME DATE OF SURGERY/
DICTATION****** 



















OPERATIVE REPORT



DATE OF SURGERY:

06/11/2018.



SURGEON:

Dr. Galina Wolfe.



ASSISTANT:

1. Maik Recio.

2. Bruce Cordero.



PREOPERATIVE DIAGNOSES:

1. Triple-vessel coronary artery disease.

2. Non-ST-elevation myocardial infarction.

3. Mild to moderate left ventricular dysfunction.

4. Sick sinus syndrome, status post permanent pacemaker insertion.

5. Chronic atrial fibrillation, on anticoagulation.

6. Recurrent extremity osteomyelitis requiring amputation of toe and fingers.

7. Severe chronic obstructive pulmonary disease, on home oxygen.

8. Benign prostatic hypertrophy.

9. Gastroesophageal reflux disease.

10.Arthritis.



POSTOPERATIVE DIAGNOSES:

1. Triple-vessel coronary artery disease.

2. Non-ST-elevation myocardial infarction.

3. Mild to moderate left ventricular dysfunction.

4. Sick sinus syndrome, status post permanent pacemaker insertion.

5. Chronic atrial fibrillation, on anticoagulation.

6. Recurrent extremity osteomyelitis requiring amputation of toe and fingers.

7. Severe chronic obstructive pulmonary disease, on home oxygen.

8. Benign prostatic hypertrophy.

9. Gastroesophageal reflux disease.

10.Arthritis.



PROCEDURES:

1. Quadruple coronary artery bypass grafting using the left internal mammary 
artery to

    the left anterior descending artery, reverse saphenous vein graft from the 
aorta to

    the first obtuse marginal artery, reverse saphenous vein graft from the 
aorta to

    the second obtuse marginal artery, reverse saphenous graft from the aorta 
to the

    right coronary artery.

2. Exclusion of the left atrial appendage using a 40 mm AtriClip.

3. Endoscopic harvesting of the right greater saphenous vein.

4. Intraoperative transesophageal echocardiogram and epiaortic scanning.

5. Intraoperative graft flow measurements using the [email or web address] system.





MMODL / IJN: [id number] / Job#: [id number]

MTDD

## 2018-06-11 NOTE — XR
EXAMINATION TYPE: XR chest 1V portable

 

DATE OF EXAM: 6/11/2018

 

CLINICAL HISTORY: Post open cardiac surgery

 

TECHNIQUE: Single AP portable frontal view of the chest is obtained.

 

COMPARISON: Chest x-ray from 2 days earlier.

 

FINDINGS:  There is new endotracheal tube with tip at aortic knob level, proximally 5 to 6 cm above t
he allie. There is new orogastric tube projecting below diaphragm. There is new left-sided chest tub
e and mediastinal drainage catheter. There is new right internal jugular Lillian-Pam catheter terminati
ng at level of pulmonary outflow tract. There are new sternal wires and mediastinal clips. There is p
ersistent multi lead pacemaker. There is new cardiac closure device along superior left heart border.


 

There is chronic parenchymal change with persistent bibasilar scarring and/or atelectasis. No sizable
 pneumothorax is seen. Cardiac silhouette size is stable and mildly enlarged with atherosclerotic aor
ta. Osseous structures are intact.

 

IMPRESSION:

1. New tubes and lines felt satisfactory in position as detailed above.

2. Cardiomegaly and chronic parenchymal change with persistent bibasilar scarring and/or atelectasis.
 No new infiltrate is seen.

## 2018-06-11 NOTE — OP
OPERATIVE REPORT



DATE OF THE SURGERY:

06/11/2018.



SURGEON:

Dr. Galina Wolfe.



ASSISTANT:

1. Jamari Recio, Nurse practitioner.

2. ROSALIO Fraga.



PREOPERATIVE DIAGNOSES:

1. Triple-vessel coronary artery disease.

2. Non-ST elevation myocardial infarction.

3. Mild to moderate left ventricular dysfunction.

4. Chronic atrial fibrillation.

5. Sick sinus syndrome, status post permanent pacemaker insertion.

6. Severe chronic obstructive pulmonary disease with home oxygen.

7. Arthritis.

8. Benign prostatic hypertrophy.

9. Gastroesophageal reflux disease.

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



POSTOPERATIVE DIAGNOSES:

1. Triple-vessel coronary artery disease.

2. Non-ST elevation myocardial infarction.

3. Mild to moderate left ventricular dysfunction.

4. Chronic atrial fibrillation.

5. Sick sinus syndrome, status post permanent pacemaker insertion.

6. Severe chronic obstructive pulmonary disease with home oxygen.

7. Arthritis.

8. Benign prostatic hypertrophy.

9. Gastroesophageal reflux disease.

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



PROCEDURE:

1. Quadruple coronary artery bypass grafting using the left internal mammary artery to

    the left anterior descending artery, reverse saphenous vein graft from the aorta to

    the 1st obtuse marginal artery, reverse saphenous vein graft from the aorta to the

    2nd obtuse marginal artery, reverse saphenous vein graft from the aorta to the

    right coronary artery.

2. Exclusion of the left atrial appendage using a 40 mm AtriClip.

3. Endoscopic harvesting of the right greater saphenous vein.

4. Intraoperative transesophageal echocardiogram and epiaortic scanning.

5. Intraoperative graft flow measurements using the Medistim system.



INDICATION FOR SURGERY:

Patient is a 78-year-old gentleman with the above comorbidities admitted to [facility] and ruled in for non ST elevation myocardial infarction.  He was

transferred to [facility] for further assessment and surgical evaluation.  The

patient has COPD and is on home oxygen.  He follows with Dr. Del Cid.  His FEV1 was

around 1.25 L.  The patient's anatomy is surgical and his left ventricular function was

mildly depressed.  He was deemed a candidate for high-risk coronary artery bypass

grafting.  The SDS risk was discussed with him and his family.  They understood the

risks and agreed to proceed.



DESCRIPTION OF THE PROCEDURE:

The patient was brought to the preoperative holding area where a right internal jugular

Childress-Pam catheter and a right radial arterial line were placed.  PA pressure was 45/22

and cardiac index was 2.7.  He subsequently was brought to the operating room where

general endotracheal anesthesia was induced uneventfully.  He received 2 g of cefazolin

intravenously.  Toth catheter was inserted.  The chest, abdomen and both lower

extremities were prepped and draped using ChloraPrep.  Ioban was used to cover the

skin.



Transesophageal echocardiogram showed mild to moderate left ventricular dysfunction,

mild aortic valve regurgitation.



A midline sternotomy was performed and the bone was moderately osteoporotic.  The left

hemisternum was elevated and the left internal mammary artery was harvested in a

somewhat skeletonized fashion.  The left pleura was intentionally opened in this

process and was drained with a 28-Turkmen chest tube.  She was given 5000 units of

heparin and the mammary artery was clipped before its bifurcation transected, had an

excellent pulsatile flow in it and was around 1.75 mm in diameter.  The right pleura

remained intact.



In the same setting, the right greater saphenous vein was harvested endoscopically from

groin to above the ankle level.  The leg incisions were closed over a drain.  The vein

appeared to be of reasonable quality around 4 mm in diameter.



Mediastinal fat was transected between 2 ties and epiaortic scanning revealed

concentric intimal thickening of the ascending aorta, but no protruding atheroma.  The

pericardium was opened in an inverted in T-fashion and a pericardial cradle was

created.



Findings included a rightward displaced heart and a short aorta that was soft.  There

was visible diffuse coronary artery disease.



After placement of respective pledgeted pursestring and after systemic heparinization,

aortic cannulation with a 21-Turkmen soft flow cannula, right atrial appendage

cannulation with a 3-stage 29-Turkmen cannula was performed.  Antegrade cardioplegia

catheter was placed.  We could not insert a retrograde catheter into the coronary

sinus, probably due to some valve at its ostium.  In view of his anatomy, I did not

think that it is essential.



Cardiopulmonary bypass was initiated and we looked at the target and appeared that the

mid LAD as it came out from a an intramyocardial course, the 1st obtuse marginal artery

which was intramyocardial, the 2nd obtuse marginal artery and we looked at the inferior

wall and there appeared to be a possible lateral branch coming either from the

circumflex or the right coronary artery that was small.  The PDA was small.  However, I

was able to find the right coronary artery before its bifurcation.  It was thin-walled;

however, on the small side around 1.5 mm in diameter and that will be the site for

bypass.



During aortic clamping, myocardial protection was achieved with initial dose of around

1 L of antegrade cold blood cardioplegia was adequate arrest at 200 mL.  Subsequent

doses were given via the constructed vein graft to the right coronary artery as well as

antegrade.

The 1st distal anastomosis was seen a segment of vein of reasonable quality and the

right coronary artery which was around 1.5 mm in diameter, thin-walled, using Prolene 7-

0 in continuous fashion. The 2nd distal anastomosis was between a good quality vein

segment and the 2nd obtuse marginal artery which was around 2 mm in diameter using

Prolene 7-0 in continuous fashion.  The 3rd distal anastomosis was between another

segment of vein of good quality and the 1st obtuse marginal artery, which was intra

myocardial diffuse disease around 1.7 mm in diameter using Prolene 7-0 in continuous

fashion.  Before this anastomosis was to the left internal mammary artery and the mid

to distal aspect of the left anterior descending artery which was around 2 mm in

diameter using Prolene 7-0 in continuous fashion.  The mammary artery pedicle was

affixed to the epicardium with 2 Prolene 6-0 sutures.



Satisfied with the distal anastomosis, rewarming was started and we performed the 3

proximal anastomoses by punching out 3 buttons of 5 mm each from the ascending aorta

and completing the 3 proximal anastomoses of the vein graft using Prolene 2-0 in a

continuous fashion.  The patient was given lidocaine and magnesium and the aorta was

unclamped.  He regained spontaneous slow junctional rhythm.  His magnet rate was 800.

So for that reason, I placed a bipolar ventricular pacing wires via the anterior aspect

of the right ventricle and systolic pacing at 80 after a period of reperfusion.  No

atrial wires were placed.  After a period of reperfusion, we were able to wean off

cardiopulmonary bypass on low-dose Levophed.  The cardiac index was 2.6.  SKIP showed

almost normal left ventricular function.



The left atrial appendage had been excluded with a 40 mm AtriClip after checking by SKIP

and ruling out any clot in it.



Test dose protamine was given.  Graft flow measurement was done and showed excellent

flow to the left internal mammary artery, which was 151 mL/minute, pulsatility index of

1.7, and diastolic filling of 71%.  The vein graft to the 1st obtuse marginal artery

had a flow of 34 mL/minute, pulsatility index of 3.8, and diastolic filling of 56%.

The vein going to the 2nd obtuse marginal artery had a flow of 74 mL/minute,

pulsatility index of 2.1, and diastolic filling of 71% showing excellent graft.  The

vein graft going to the right coronary artery had a flow of 39 mL/minute, pulsatility

index of 2.5, and diastolic filling of 58% showing an excellent graft.  Full dose

protamine was given subsequently.  Decannulation followed.  The venous cannulation site

required reinforcement with a pledgeted 4-0 Prolene.  The price of the vein graft going

to the right carotid artery was thin-walled and I had to insert a pericardial

pledgetted suture at the price to control that.  The flow was rechecked into the graft.

It was unchanged.  One 36-Turkmen substernal chest tube was placed.  A groove was made

in the left pleura pericardial fat of mood.  The mammary artery medial to the lung and

away from the posterior sternal table.  The mediastinal fat was used to cover the aorta

and all the graft.  The pericardial fat was approximated loosely over the heart.



After ensuring adequate hemostasis and hemodynamic and after correct sponge,

instrument, and needle count, the sternum was closed using 5 figure-of-eight pineal

cable after interposing fibular between the sternal edges.  Thorough irrigation of

cefazolin followed.  The rest of the closure proceeded in layers.  Skin glue was

applied.



The patient did not receive blood byproducts, but received 700 mL of Cell Saver blood.

His cardiac index with the chest closed was 2.6.  He was transferred to the ICU on low-

dose Levophed with good hemodynamics and ventricular paced at 80.





MMODL / IJN: [id number] / Job#: [id number]

## 2018-06-12 LAB
ALBUMIN SERPL-MCNC: 3.2 G/DL (ref 3.5–5)
ALP SERPL-CCNC: 29 U/L (ref 38–126)
ALT SERPL-CCNC: 33 U/L (ref 21–72)
ANION GAP SERPL CALC-SCNC: 8 MMOL/L
APTT BLD: 22.3 SEC (ref 22–30)
AST SERPL-CCNC: 59 U/L (ref 17–59)
BASOPHILS # BLD AUTO: 0 K/UL (ref 0–0.2)
BASOPHILS NFR BLD AUTO: 0 %
BUN SERPL-SCNC: 33 MG/DL (ref 9–20)
CALCIUM SPEC-MCNC: 8.6 MG/DL (ref 8.4–10.2)
CHLORIDE SERPL-SCNC: 104 MMOL/L (ref 98–107)
CO2 SERPL-SCNC: 28 MMOL/L (ref 22–30)
EOSINOPHIL # BLD AUTO: 0 K/UL (ref 0–0.7)
EOSINOPHIL NFR BLD AUTO: 0 %
ERYTHROCYTE [DISTWIDTH] IN BLOOD BY AUTOMATED COUNT: 3.11 M/UL (ref 4.3–5.9)
ERYTHROCYTE [DISTWIDTH] IN BLOOD: 15.1 % (ref 11.5–15.5)
GLUCOSE BLD-MCNC: 111 MG/DL (ref 75–99)
GLUCOSE BLD-MCNC: 118 MG/DL (ref 75–99)
GLUCOSE BLD-MCNC: 118 MG/DL (ref 75–99)
GLUCOSE BLD-MCNC: 121 MG/DL (ref 75–99)
GLUCOSE BLD-MCNC: 125 MG/DL (ref 75–99)
GLUCOSE BLD-MCNC: 128 MG/DL (ref 75–99)
GLUCOSE BLD-MCNC: 137 MG/DL (ref 75–99)
GLUCOSE BLD-MCNC: 139 MG/DL (ref 75–99)
GLUCOSE BLD-MCNC: 140 MG/DL (ref 75–99)
GLUCOSE BLD-MCNC: 142 MG/DL (ref 75–99)
GLUCOSE BLD-MCNC: 142 MG/DL (ref 75–99)
GLUCOSE BLD-MCNC: 151 MG/DL (ref 75–99)
GLUCOSE BLD-MCNC: 156 MG/DL (ref 75–99)
GLUCOSE BLD-MCNC: 157 MG/DL (ref 75–99)
GLUCOSE BLD-MCNC: 157 MG/DL (ref 75–99)
GLUCOSE SERPL-MCNC: 107 MG/DL (ref 74–99)
HCT VFR BLD AUTO: 27.5 % (ref 39–53)
HGB BLD-MCNC: 9 GM/DL (ref 13–17.5)
INR PPP: 1.4 (ref ?–1.2)
LYMPHOCYTES # SPEC AUTO: 0.7 K/UL (ref 1–4.8)
LYMPHOCYTES NFR SPEC AUTO: 7 %
MAGNESIUM SPEC-SCNC: 2.6 MG/DL (ref 1.6–2.3)
MCH RBC QN AUTO: 28.9 PG (ref 25–35)
MCHC RBC AUTO-ENTMCNC: 32.8 G/DL (ref 31–37)
MCV RBC AUTO: 88.2 FL (ref 80–100)
MONOCYTES # BLD AUTO: 0.7 K/UL (ref 0–1)
MONOCYTES NFR BLD AUTO: 6 %
NEUTROPHILS # BLD AUTO: 9.6 K/UL (ref 1.3–7.7)
NEUTROPHILS NFR BLD AUTO: 86 %
PLATELET # BLD AUTO: 84 K/UL (ref 150–450)
POTASSIUM SERPL-SCNC: 5.3 MMOL/L (ref 3.5–5.1)
PROT SERPL-MCNC: 4.9 G/DL (ref 6.3–8.2)
PT BLD: 12.8 SEC (ref 9–12)
SODIUM SERPL-SCNC: 140 MMOL/L (ref 137–145)
WBC # BLD AUTO: 11.2 K/UL (ref 3.8–10.6)

## 2018-06-12 RX ADMIN — CEFAZOLIN SCH GM: 10 INJECTION, POWDER, FOR SOLUTION INTRAVENOUS at 08:25

## 2018-06-12 RX ADMIN — POTASSIUM CHLORIDE SCH: 14.9 INJECTION, SOLUTION INTRAVENOUS at 18:07

## 2018-06-12 RX ADMIN — ACETAMINOPHEN SCH MLS/HR: 10 INJECTION, SOLUTION INTRAVENOUS at 12:07

## 2018-06-12 RX ADMIN — HEPARIN SODIUM SCH: 5000 INJECTION, SOLUTION INTRAVENOUS; SUBCUTANEOUS at 00:03

## 2018-06-12 RX ADMIN — HEPARIN SODIUM SCH: 5000 INJECTION, SOLUTION INTRAVENOUS; SUBCUTANEOUS at 18:04

## 2018-06-12 RX ADMIN — IPRATROPIUM BROMIDE AND ALBUTEROL SULFATE SCH ML: .5; 3 SOLUTION RESPIRATORY (INHALATION) at 19:51

## 2018-06-12 RX ADMIN — IPRATROPIUM BROMIDE AND ALBUTEROL SULFATE SCH ML: .5; 3 SOLUTION RESPIRATORY (INHALATION) at 05:33

## 2018-06-12 RX ADMIN — IPRATROPIUM BROMIDE AND ALBUTEROL SULFATE SCH ML: .5; 3 SOLUTION RESPIRATORY (INHALATION) at 15:26

## 2018-06-12 RX ADMIN — MUPIROCIN SCH APPLIC: 20 OINTMENT TOPICAL at 20:15

## 2018-06-12 RX ADMIN — ASPIRIN 325 MG ORAL TABLET SCH MG: 325 PILL ORAL at 08:27

## 2018-06-12 RX ADMIN — CLOPIDOGREL BISULFATE SCH MG: 75 TABLET ORAL at 14:29

## 2018-06-12 RX ADMIN — ACETAMINOPHEN SCH MLS/HR: 10 INJECTION, SOLUTION INTRAVENOUS at 18:06

## 2018-06-12 RX ADMIN — INSULIN ASPART SCH UNIT: 100 INJECTION, SOLUTION INTRAVENOUS; SUBCUTANEOUS at 20:15

## 2018-06-12 RX ADMIN — GABAPENTIN SCH MG: 100 CAPSULE ORAL at 21:20

## 2018-06-12 RX ADMIN — DOCUSATE SODIUM AND SENNOSIDES SCH EACH: 50; 8.6 TABLET ORAL at 20:15

## 2018-06-12 RX ADMIN — SODIUM CHLORIDE, PRESERVATIVE FREE SCH: 5 INJECTION INTRAVENOUS at 10:22

## 2018-06-12 RX ADMIN — TAMSULOSIN HYDROCHLORIDE SCH MG: 0.4 CAPSULE ORAL at 08:26

## 2018-06-12 RX ADMIN — ACETAMINOPHEN SCH MLS/HR: 10 INJECTION, SOLUTION INTRAVENOUS at 07:13

## 2018-06-12 RX ADMIN — GABAPENTIN SCH MG: 100 CAPSULE ORAL at 18:04

## 2018-06-12 RX ADMIN — IPRATROPIUM BROMIDE AND ALBUTEROL SULFATE SCH ML: .5; 3 SOLUTION RESPIRATORY (INHALATION) at 09:16

## 2018-06-12 RX ADMIN — GABAPENTIN SCH MG: 100 CAPSULE ORAL at 08:26

## 2018-06-12 RX ADMIN — Medication SCH MG: at 12:08

## 2018-06-12 RX ADMIN — IPRATROPIUM BROMIDE AND ALBUTEROL SULFATE SCH ML: .5; 3 SOLUTION RESPIRATORY (INHALATION) at 12:40

## 2018-06-12 RX ADMIN — OXYCODONE HYDROCHLORIDE AND ACETAMINOPHEN SCH MG: 500 TABLET ORAL at 12:07

## 2018-06-12 RX ADMIN — HEPARIN SODIUM SCH: 5000 INJECTION, SOLUTION INTRAVENOUS; SUBCUTANEOUS at 08:26

## 2018-06-12 RX ADMIN — PANTOPRAZOLE SODIUM SCH MG: 40 TABLET, DELAYED RELEASE ORAL at 08:24

## 2018-06-12 RX ADMIN — BUDESONIDE AND FORMOTEROL FUMARATE DIHYDRATE SCH PUFF: 160; 4.5 AEROSOL RESPIRATORY (INHALATION) at 19:51

## 2018-06-12 RX ADMIN — ATORVASTATIN CALCIUM SCH MG: 40 TABLET, FILM COATED ORAL at 14:29

## 2018-06-12 RX ADMIN — METOPROLOL TARTRATE SCH MG: 25 TABLET, FILM COATED ORAL at 14:29

## 2018-06-12 RX ADMIN — METOPROLOL TARTRATE SCH: 25 TABLET, FILM COATED ORAL at 20:16

## 2018-06-12 RX ADMIN — MUPIROCIN SCH APPLIC: 20 OINTMENT TOPICAL at 08:27

## 2018-06-12 RX ADMIN — SODIUM CHLORIDE, PRESERVATIVE FREE SCH ML: 5 INJECTION INTRAVENOUS at 20:16

## 2018-06-12 RX ADMIN — BUDESONIDE AND FORMOTEROL FUMARATE DIHYDRATE SCH PUFF: 160; 4.5 AEROSOL RESPIRATORY (INHALATION) at 09:16

## 2018-06-12 NOTE — P.PN
Subjective


Progress Note Date: 06/12/18


Principal diagnosis: 


Coronary artery disease, status post coronary artery bypass grafting, postop 

day 1





Progress note dated 06/11/2018





78-year-old male with a history of non-ST segment elevation myocardial 

infarction, status post cardiac catheterization revealing severe triple-vessel 

CAD.  In addition, he is quite severe COPD, with an FEV1 in the range of 45% of 

predicted.  He has multiple additional comorbidities including chronic atrial 

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 

systolic heart failure.  The patient surgery is to be done today.  The patient 

is on O2 3 L nasal cannula and has a dextrose half-normal saline IV at KVO.  He 

was on a heparin drip but obviously that was turned off prior to surgery.  

Other than that, the patient is doing reasonably well.  He certainly is at high 

risk.  This has been documented by my partner.





On 06/12/2018 patient seen in follow-up in the intensive care unit.  This is 

postop day 1, status post quadruple coronary artery bypass grafting using the 

LIMA to LAD, reverse SVG to OM1, reverse SVG to OM 2, reverse SVG to the RCA, 

exclusion of the left atrial appendage, and intraoperative SKIP and AP aortic 

scanning.  Patient was extubated in less than 6 hours post OR exit time.  

Patient was extubated at 9:00 last night successfully to a 40% Ventimask, on 

which she remains this morning.  He is awake alert, sitting up in the recliner, 

doing well, denies any acute distress.  Midsternal incision is stable, clean 

dry and intact


One mediastinal and one left pleural chest tube draining small amount of 

serosanguineous drainage.  Maintenance IV includes LR at a rate of 50 ML per 

hour, insulin drip at 2 units per hour, no vasoactive drips at this time, 

clever proximal has been discontinued, as well as nitroglycerin drip.  Toth 

catheter is in place, and the urine output is ranging from 30-75 ML per hour.  

Cardiac index and output is 5.5 and 3.0 respectively.  Dissipate 

discontinuation of the PA catheter this morning.  PA pressures 28/11, with a 

map of 19.  Patient has not required any blood transfusions.  Today's labs were 

reviewed, WBCs 11.2, hemoglobin is 9.0, INR is 1.4, potassium is 5.3, BUN is 33

, creatinine 0.80.  Today's chest x-ray has been reviewed and shows basilar 

atelectasis versus edema.  Lung sounds positive for some crackles over left 

lower lobe anteriorly, diminished on the right.  Patient Solu-Medrol's has been 

switched to oral prednisone.  





Objective





- Vital Signs


Vital signs: 


 Vital Signs











Temp  98.1 F   06/12/18 04:00


 


Pulse  92   06/12/18 07:00


 


Resp  12   06/12/18 07:00


 


BP  123/56   06/11/18 06:00


 


Pulse Ox  98   06/12/18 07:00








 Intake & Output











 06/11/18 06/12/18 06/12/18





 18:59 06:59 18:59


 


Intake Total 403 1308.716 189


 


Output Total 2906 1204 85


 


Balance -2503 104.716 104


 


Weight  80.2 kg 


 


Intake:   


 


   1289 189


 


    ACETAMINOPHEN IV (For   100





    ) 1,000 mg In Empty Bag 1   





    bag @ 400 mls/hr IVPB   





    Q6HR RHONDA Rx#:[id number]   


 


    Albumin Human 5% 250 ml  250 





    In Empty Bag 1 bag @ 250   





    mls/hr IVPB Q1HR PRN Rx#:   





    [id number]   


 


    Calcium Chloride 1,000 mg 100  





    In Sodium Chloride 0.9%   





    100 ml @ 100 mls/hr IV   





    ONCE PRN Rx#:[id number]   


 


    Cardiac Index/Output  360 30


 


    Lactated Ringers 1,000 ml 200 550 50





    @ 20 mls/hr IV .Q24H RHONDA   





    Rx#:[id number]   


 


    Pressure bag  99 9


 


    ceFAZolin 2 gm In Sodium  30 





    Chloride 0.9% 30 ml @ 60   





    mls/hr IVPB ONCE ONE Rx#:   





    [id number]   


 


  Intake, IV Titration  19.716 





  Amount   


 


    Clevidipine Butyrate 25  5.5 





    mg In Empty Bag 1 bag @ 1   





    MG/HR 2 mls/hr IV .Q24H   





    RHONDA Rx#:[id number]   


 


    Insulin Regular 100 unit  14.216 





    In Sodium Chloride 0.9%   





    100 ml @ Per Protocol IV   





    .Q0M RHONDA Rx#:[id number]   


 


Output:   


 


  Chest Tube Drainage 344 727 40


 


    Left Pleural Chest Tube 184 473 30


 


    Mediastinal Chest Tube 160 254 10


 


  Urine 562 477 45


 


  Estimated Blood Loss 2000  


 


Other:   


 


  Voiding Method Indwelling Catheter Indwelling Catheter 








 ABP, PAP, CO, CI - Last Documented











Arterial Blood Pressure        133/45


 


Pulmonary Artery Pressure      29/11


 


Cardiac Output                 5.1


 


Cardiac Index                  3.0

















- Exam


GENERAL EXAM: Alert, pleasant, 70-year-old white male comfortable in no 

apparent distress, on 40% Ventimask


HEAD: Normocephalic/atraumatic.


EYES: Normal reaction of pupils, equal size.  Conjunctiva pink, sclera white.


NOSE: Clear with pink turbinates.


THROAT: No erythema or exudates.


NECK: No masses, no JVD, no thyroid enlargement, no adenopathy.  Right IJ 

Cordis with a PA catheter is present


CHEST: No chest wall deformity.  Symmetrical expansion.  Midsternal incision is 

clean dry and intact and stable.  Mediastinal and left pleural chest tubes are 

present connected to Pleur-evac's and to wall suction, with serosanguineous 

output and the collection chambers no air leak noted.


LUNGS: Equal air entry with left lower lobe crackles, and diminished on the 

right, no rhonchi or wheezes noted


CVS: Regular rate and rhythm, normal S1 and S2, no gallops, no murmurs, no rubs


ABDOMEN: Soft, nontender.  No hepatosplenomegaly, normal bowel sounds, no 

guarding or rigidity.


EXTREMITIES: No clubbing, no edema, no cyanosis, 2+ pulses and upper and lower 

extremities.  There is a JOCE drain in the right lower leg, right lower leg is 

Ace wrapped


MUSCULOSKELETAL: Muscle strength and tone normal.


SPINE: No scoliosis or deformity


SKIN: No rashes


CENTRAL NERVOUS SYSTEM: Alert and oriented -3.  No focal deficits, tone is 

normal in all 4 extremities.


PSYCHIATRIC: Alert and oriented -3.  Appropriate affect.  Intact judgment and 

insight.











- Labs


CBC & Chem 7: 


 06/12/18 03:55





 06/12/18 03:55


Labs: 


 Abnormal Lab Results - Last 24 Hours (Table)











  06/10/18 06/11/18 06/11/18 Range/Units





  11:16 10:19 11:02 


 


WBC     (3.8-10.6)  k/uL


 


RBC     (4.30-5.90)  m/uL


 


Hgb     (13.0-17.5)  gm/dL


 


Hct     (39.0-53.0)  %


 


Plt Count     (150-450)  k/uL


 


Neutrophils #     (1.3-7.7)  k/uL


 


Lymphocytes #     (1.0-4.8)  k/uL


 


Monocytes #     (0-1.0)  k/uL


 


PT     (9.0-12.0)  sec


 


INR     (<1.2)  


 


ABG pH     (7.35-7.45)  


 


ABG pCO2   54 H  48 H  (35-45)  mmHg


 


ABG pO2   214 H  >420 H  ()  mmHg


 


ABG HCO3   30 H  28 H  (21-25)  mmol/L


 


ABG Total CO2   32 H  30 H  (19-24)  mmol/L


 


ABG O2 Saturation   99.6 H  100.0 H  (94-97)  %


 


ABG Hematocrit    28 L  (34.0-46.0)  %


 


ABG Sodium    134 L  (135-146)  mmol/L


 


ABG Potassium     (3.4-4.5)  mmol/L


 


ABG Ionized Calcium    4.2 L  (4.5-5.3)  mg/dL


 


ABG Glucose   158 H  149 H  (75-99)  mg/dL


 


ABG Lactic Acid   1.7 H  1.9 H  (0.5-1.6)  mmol/L


 


Hemoglobin   12.4 L  9.2 L  (13.0-17.5)  gm/dL


 


Potassium     (3.5-5.1)  mmol/L


 


BUN     (9-20)  mg/dL


 


Glucose     (74-99)  mg/dL


 


POC Glucose (mg/dL)     (75-99)  mg/dL


 


Calcium     (8.4-10.2)  mg/dL


 


Magnesium     (1.6-2.3)  mg/dL


 


Alkaline Phosphatase     ()  U/L


 


Total Protein     (6.3-8.2)  g/dL


 


Albumin     (3.5-5.0)  g/dL


 


Arterial Blood Potassium     (3.4-4.5)  mmol/L


 


Arterial Blood Glucose   158 H  149 H  (75-99)  mg/dL


 


Crossmatch  See Detail    














  06/11/18 06/11/18 06/11/18 Range/Units





  11:31 12:05 12:38 


 


WBC     (3.8-10.6)  k/uL


 


RBC     (4.30-5.90)  m/uL


 


Hgb     (13.0-17.5)  gm/dL


 


Hct     (39.0-53.0)  %


 


Plt Count     (150-450)  k/uL


 


Neutrophils #     (1.3-7.7)  k/uL


 


Lymphocytes #     (1.0-4.8)  k/uL


 


Monocytes #     (0-1.0)  k/uL


 


PT     (9.0-12.0)  sec


 


INR     (<1.2)  


 


ABG pH  7.46 H    (7.35-7.45)  


 


ABG pCO2     (35-45)  mmHg


 


ABG pO2  276 H  259 H  309 H  ()  mmHg


 


ABG HCO3  28 H  28 H  27 H  (21-25)  mmol/L


 


ABG Total CO2  29 H  29 H  29 H  (19-24)  mmol/L


 


ABG O2 Saturation  100.0 H  99.9 H  100.0 H  (94-97)  %


 


ABG Hematocrit  26 L  26 L  25 L  (34.0-46.0)  %


 


ABG Sodium  132 L  134 L  133 L  (135-146)  mmol/L


 


ABG Potassium  5.6 H  5.3 H  4.9 H  (3.4-4.5)  mmol/L


 


ABG Ionized Calcium  4.1 L  4.2 L  4.2 L  (4.5-5.3)  mg/dL


 


ABG Glucose  244 H  259 H  292 H  (75-99)  mg/dL


 


ABG Lactic Acid  2.3 H*  2.8 H*  3.3 H*  (0.5-1.6)  mmol/L


 


Hemoglobin  8.5 L  8.4 L  8.1 L  (13.0-17.5)  gm/dL


 


Potassium     (3.5-5.1)  mmol/L


 


BUN     (9-20)  mg/dL


 


Glucose     (74-99)  mg/dL


 


POC Glucose (mg/dL)     (75-99)  mg/dL


 


Calcium     (8.4-10.2)  mg/dL


 


Magnesium     (1.6-2.3)  mg/dL


 


Alkaline Phosphatase     ()  U/L


 


Total Protein     (6.3-8.2)  g/dL


 


Albumin     (3.5-5.0)  g/dL


 


Arterial Blood Potassium  5.6 H  5.3 H  4.9 H  (3.4-4.5)  mmol/L


 


Arterial Blood Glucose  244 H  259 H  292 H  (75-99)  mg/dL


 


Crossmatch     














  06/11/18 06/11/18 06/11/18 Range/Units





  13:58 15:00 15:00 


 


WBC   23.7 H   (3.8-10.6)  k/uL


 


RBC   3.26 L   (4.30-5.90)  m/uL


 


Hgb   9.4 L D   (13.0-17.5)  gm/dL


 


Hct   28.7 L   (39.0-53.0)  %


 


Plt Count   115 L   (150-450)  k/uL


 


Neutrophils #   21.6 H   (1.3-7.7)  k/uL


 


Lymphocytes #   0.9 L   (1.0-4.8)  k/uL


 


Monocytes #   1.1 H   (0-1.0)  k/uL


 


PT     (9.0-12.0)  sec


 


INR     (<1.2)  


 


ABG pH     (7.35-7.45)  


 


ABG pCO2  46 H    (35-45)  mmHg


 


ABG pO2  209 H    ()  mmHg


 


ABG HCO3  27 H    (21-25)  mmol/L


 


ABG Total CO2  28 H    (19-24)  mmol/L


 


ABG O2 Saturation  99.7 H    (94-97)  %


 


ABG Hematocrit  27 L    (34.0-46.0)  %


 


ABG Sodium     (135-146)  mmol/L


 


ABG Potassium     (3.4-4.5)  mmol/L


 


ABG Ionized Calcium     (4.5-5.3)  mg/dL


 


ABG Glucose  190 H    (75-99)  mg/dL


 


ABG Lactic Acid  2.7 H*    (0.5-1.6)  mmol/L


 


Hemoglobin  8.9 L    (13.0-17.5)  gm/dL


 


Potassium     (3.5-5.1)  mmol/L


 


BUN    24 H  (9-20)  mg/dL


 


Glucose    110 H  (74-99)  mg/dL


 


POC Glucose (mg/dL)     (75-99)  mg/dL


 


Calcium    7.9 L  (8.4-10.2)  mg/dL


 


Magnesium    2.9 H  (1.6-2.3)  mg/dL


 


Alkaline Phosphatase    32 L  ()  U/L


 


Total Protein    4.5 L  (6.3-8.2)  g/dL


 


Albumin    2.9 L  (3.5-5.0)  g/dL


 


Arterial Blood Potassium     (3.4-4.5)  mmol/L


 


Arterial Blood Glucose  190 H    (75-99)  mg/dL


 


Crossmatch     














  06/11/18 06/11/18 06/11/18 Range/Units





  15:00 15:19 15:37 


 


WBC     (3.8-10.6)  k/uL


 


RBC     (4.30-5.90)  m/uL


 


Hgb     (13.0-17.5)  gm/dL


 


Hct     (39.0-53.0)  %


 


Plt Count     (150-450)  k/uL


 


Neutrophils #     (1.3-7.7)  k/uL


 


Lymphocytes #     (1.0-4.8)  k/uL


 


Monocytes #     (0-1.0)  k/uL


 


PT  13.9 H    (9.0-12.0)  sec


 


INR  1.5 H    (<1.2)  


 


ABG pH    7.31 L  (7.35-7.45)  


 


ABG pCO2    58 H  (35-45)  mmHg


 


ABG pO2    >400 H  ()  mmHg


 


ABG HCO3    29 H  (21-25)  mmol/L


 


ABG Total CO2    31 H  (19-24)  mmol/L


 


ABG O2 Saturation    100.0 H  (94-97)  %


 


ABG Hematocrit     (34.0-46.0)  %


 


ABG Sodium     (135-146)  mmol/L


 


ABG Potassium     (3.4-4.5)  mmol/L


 


ABG Ionized Calcium     (4.5-5.3)  mg/dL


 


ABG Glucose     (75-99)  mg/dL


 


ABG Lactic Acid     (0.5-1.6)  mmol/L


 


Hemoglobin     (13.0-17.5)  gm/dL


 


Potassium     (3.5-5.1)  mmol/L


 


BUN     (9-20)  mg/dL


 


Glucose     (74-99)  mg/dL


 


POC Glucose (mg/dL)   122 H   (75-99)  mg/dL


 


Calcium     (8.4-10.2)  mg/dL


 


Magnesium     (1.6-2.3)  mg/dL


 


Alkaline Phosphatase     ()  U/L


 


Total Protein     (6.3-8.2)  g/dL


 


Albumin     (3.5-5.0)  g/dL


 


Arterial Blood Potassium     (3.4-4.5)  mmol/L


 


Arterial Blood Glucose     (75-99)  mg/dL


 


Crossmatch     














  06/11/18 06/11/18 06/11/18 Range/Units





  16:41 17:51 18:54 


 


WBC   19.8 H   (3.8-10.6)  k/uL


 


RBC   3.24 L   (4.30-5.90)  m/uL


 


Hgb   9.4 L   (13.0-17.5)  gm/dL


 


Hct   28.5 L   (39.0-53.0)  %


 


Plt Count   94 L   (150-450)  k/uL


 


Neutrophils #   16.7 H   (1.3-7.7)  k/uL


 


Lymphocytes #   0.8 L   (1.0-4.8)  k/uL


 


Monocytes #   1.8 H   (0-1.0)  k/uL


 


PT     (9.0-12.0)  sec


 


INR     (<1.2)  


 


ABG pH     (7.35-7.45)  


 


ABG pCO2     (35-45)  mmHg


 


ABG pO2     ()  mmHg


 


ABG HCO3     (21-25)  mmol/L


 


ABG Total CO2     (19-24)  mmol/L


 


ABG O2 Saturation     (94-97)  %


 


ABG Hematocrit     (34.0-46.0)  %


 


ABG Sodium     (135-146)  mmol/L


 


ABG Potassium     (3.4-4.5)  mmol/L


 


ABG Ionized Calcium     (4.5-5.3)  mg/dL


 


ABG Glucose     (75-99)  mg/dL


 


ABG Lactic Acid     (0.5-1.6)  mmol/L


 


Hemoglobin     (13.0-17.5)  gm/dL


 


Potassium     (3.5-5.1)  mmol/L


 


BUN     (9-20)  mg/dL


 


Glucose     (74-99)  mg/dL


 


POC Glucose (mg/dL)  74 L   131 H  (75-99)  mg/dL


 


Calcium     (8.4-10.2)  mg/dL


 


Magnesium     (1.6-2.3)  mg/dL


 


Alkaline Phosphatase     ()  U/L


 


Total Protein     (6.3-8.2)  g/dL


 


Albumin     (3.5-5.0)  g/dL


 


Arterial Blood Potassium     (3.4-4.5)  mmol/L


 


Arterial Blood Glucose     (75-99)  mg/dL


 


Crossmatch     














  06/11/18 06/11/18 06/11/18 Range/Units





  19:58 20:00 20:00 


 


WBC   19.9 H   (3.8-10.6)  k/uL


 


RBC   3.39 L   (4.30-5.90)  m/uL


 


Hgb   9.8 L   (13.0-17.5)  gm/dL


 


Hct   30.0 L   (39.0-53.0)  %


 


Plt Count   126 L   (150-450)  k/uL


 


Neutrophils #   17.9 H   (1.3-7.7)  k/uL


 


Lymphocytes #   0.6 L   (1.0-4.8)  k/uL


 


Monocytes #   1.2 H   (0-1.0)  k/uL


 


PT     (9.0-12.0)  sec


 


INR     (<1.2)  


 


ABG pH     (7.35-7.45)  


 


ABG pCO2     (35-45)  mmHg


 


ABG pO2     ()  mmHg


 


ABG HCO3     (21-25)  mmol/L


 


ABG Total CO2     (19-24)  mmol/L


 


ABG O2 Saturation     (94-97)  %


 


ABG Hematocrit     (34.0-46.0)  %


 


ABG Sodium     (135-146)  mmol/L


 


ABG Potassium     (3.4-4.5)  mmol/L


 


ABG Ionized Calcium     (4.5-5.3)  mg/dL


 


ABG Glucose     (75-99)  mg/dL


 


ABG Lactic Acid     (0.5-1.6)  mmol/L


 


Hemoglobin     (13.0-17.5)  gm/dL


 


Potassium     (3.5-5.1)  mmol/L


 


BUN    28 H  (9-20)  mg/dL


 


Glucose    146 H  (74-99)  mg/dL


 


POC Glucose (mg/dL)  152 H    (75-99)  mg/dL


 


Calcium     (8.4-10.2)  mg/dL


 


Magnesium     (1.6-2.3)  mg/dL


 


Alkaline Phosphatase     ()  U/L


 


Total Protein     (6.3-8.2)  g/dL


 


Albumin     (3.5-5.0)  g/dL


 


Arterial Blood Potassium     (3.4-4.5)  mmol/L


 


Arterial Blood Glucose     (75-99)  mg/dL


 


Crossmatch     














  06/11/18 06/11/18 06/11/18 Range/Units





  20:00 20:32 21:07 


 


WBC     (3.8-10.6)  k/uL


 


RBC     (4.30-5.90)  m/uL


 


Hgb     (13.0-17.5)  gm/dL


 


Hct     (39.0-53.0)  %


 


Plt Count     (150-450)  k/uL


 


Neutrophils #     (1.3-7.7)  k/uL


 


Lymphocytes #     (1.0-4.8)  k/uL


 


Monocytes #     (0-1.0)  k/uL


 


PT     (9.0-12.0)  sec


 


INR     (<1.2)  


 


ABG pH     (7.35-7.45)  


 


ABG pCO2   47 H   (35-45)  mmHg


 


ABG pO2   73 L   ()  mmHg


 


ABG HCO3   28 H   (21-25)  mmol/L


 


ABG Total CO2   30 H   (19-24)  mmol/L


 


ABG O2 Saturation     (94-97)  %


 


ABG Hematocrit     (34.0-46.0)  %


 


ABG Sodium     (135-146)  mmol/L


 


ABG Potassium     (3.4-4.5)  mmol/L


 


ABG Ionized Calcium     (4.5-5.3)  mg/dL


 


ABG Glucose     (75-99)  mg/dL


 


ABG Lactic Acid     (0.5-1.6)  mmol/L


 


Hemoglobin     (13.0-17.5)  gm/dL


 


Potassium     (3.5-5.1)  mmol/L


 


BUN     (9-20)  mg/dL


 


Glucose     (74-99)  mg/dL


 


POC Glucose (mg/dL)    161 H  (75-99)  mg/dL


 


Calcium     (8.4-10.2)  mg/dL


 


Magnesium  2.7 H    (1.6-2.3)  mg/dL


 


Alkaline Phosphatase     ()  U/L


 


Total Protein     (6.3-8.2)  g/dL


 


Albumin     (3.5-5.0)  g/dL


 


Arterial Blood Potassium     (3.4-4.5)  mmol/L


 


Arterial Blood Glucose     (75-99)  mg/dL


 


Crossmatch     














  06/11/18 06/11/18 06/12/18 Range/Units





  21:53 23:08 00:11 


 


WBC     (3.8-10.6)  k/uL


 


RBC     (4.30-5.90)  m/uL


 


Hgb     (13.0-17.5)  gm/dL


 


Hct     (39.0-53.0)  %


 


Plt Count     (150-450)  k/uL


 


Neutrophils #     (1.3-7.7)  k/uL


 


Lymphocytes #     (1.0-4.8)  k/uL


 


Monocytes #     (0-1.0)  k/uL


 


PT     (9.0-12.0)  sec


 


INR     (<1.2)  


 


ABG pH     (7.35-7.45)  


 


ABG pCO2     (35-45)  mmHg


 


ABG pO2     ()  mmHg


 


ABG HCO3     (21-25)  mmol/L


 


ABG Total CO2     (19-24)  mmol/L


 


ABG O2 Saturation     (94-97)  %


 


ABG Hematocrit     (34.0-46.0)  %


 


ABG Sodium     (135-146)  mmol/L


 


ABG Potassium     (3.4-4.5)  mmol/L


 


ABG Ionized Calcium     (4.5-5.3)  mg/dL


 


ABG Glucose     (75-99)  mg/dL


 


ABG Lactic Acid     (0.5-1.6)  mmol/L


 


Hemoglobin     (13.0-17.5)  gm/dL


 


Potassium     (3.5-5.1)  mmol/L


 


BUN     (9-20)  mg/dL


 


Glucose     (74-99)  mg/dL


 


POC Glucose (mg/dL)  153 H  134 H  125 H  (75-99)  mg/dL


 


Calcium     (8.4-10.2)  mg/dL


 


Magnesium     (1.6-2.3)  mg/dL


 


Alkaline Phosphatase     ()  U/L


 


Total Protein     (6.3-8.2)  g/dL


 


Albumin     (3.5-5.0)  g/dL


 


Arterial Blood Potassium     (3.4-4.5)  mmol/L


 


Arterial Blood Glucose     (75-99)  mg/dL


 


Crossmatch     














  06/12/18 06/12/18 06/12/18 Range/Units





  01:03 02:09 02:59 


 


WBC     (3.8-10.6)  k/uL


 


RBC     (4.30-5.90)  m/uL


 


Hgb     (13.0-17.5)  gm/dL


 


Hct     (39.0-53.0)  %


 


Plt Count     (150-450)  k/uL


 


Neutrophils #     (1.3-7.7)  k/uL


 


Lymphocytes #     (1.0-4.8)  k/uL


 


Monocytes #     (0-1.0)  k/uL


 


PT     (9.0-12.0)  sec


 


INR     (<1.2)  


 


ABG pH     (7.35-7.45)  


 


ABG pCO2     (35-45)  mmHg


 


ABG pO2     ()  mmHg


 


ABG HCO3     (21-25)  mmol/L


 


ABG Total CO2     (19-24)  mmol/L


 


ABG O2 Saturation     (94-97)  %


 


ABG Hematocrit     (34.0-46.0)  %


 


ABG Sodium     (135-146)  mmol/L


 


ABG Potassium     (3.4-4.5)  mmol/L


 


ABG Ionized Calcium     (4.5-5.3)  mg/dL


 


ABG Glucose     (75-99)  mg/dL


 


ABG Lactic Acid     (0.5-1.6)  mmol/L


 


Hemoglobin     (13.0-17.5)  gm/dL


 


Potassium     (3.5-5.1)  mmol/L


 


BUN     (9-20)  mg/dL


 


Glucose     (74-99)  mg/dL


 


POC Glucose (mg/dL)  121 H  140 H  118 H  (75-99)  mg/dL


 


Calcium     (8.4-10.2)  mg/dL


 


Magnesium     (1.6-2.3)  mg/dL


 


Alkaline Phosphatase     ()  U/L


 


Total Protein     (6.3-8.2)  g/dL


 


Albumin     (3.5-5.0)  g/dL


 


Arterial Blood Potassium     (3.4-4.5)  mmol/L


 


Arterial Blood Glucose     (75-99)  mg/dL


 


Crossmatch     














  06/12/18 06/12/18 06/12/18 Range/Units





  03:55 03:55 03:55 


 


WBC  11.2 H    (3.8-10.6)  k/uL


 


RBC  3.11 L    (4.30-5.90)  m/uL


 


Hgb  9.0 L    (13.0-17.5)  gm/dL


 


Hct  27.5 L    (39.0-53.0)  %


 


Plt Count  84 L    (150-450)  k/uL


 


Neutrophils #  9.6 H    (1.3-7.7)  k/uL


 


Lymphocytes #  0.7 L    (1.0-4.8)  k/uL


 


Monocytes #     (0-1.0)  k/uL


 


PT   12.8 H   (9.0-12.0)  sec


 


INR   1.4 H   (<1.2)  


 


ABG pH     (7.35-7.45)  


 


ABG pCO2     (35-45)  mmHg


 


ABG pO2     ()  mmHg


 


ABG HCO3     (21-25)  mmol/L


 


ABG Total CO2     (19-24)  mmol/L


 


ABG O2 Saturation     (94-97)  %


 


ABG Hematocrit     (34.0-46.0)  %


 


ABG Sodium     (135-146)  mmol/L


 


ABG Potassium     (3.4-4.5)  mmol/L


 


ABG Ionized Calcium     (4.5-5.3)  mg/dL


 


ABG Glucose     (75-99)  mg/dL


 


ABG Lactic Acid     (0.5-1.6)  mmol/L


 


Hemoglobin     (13.0-17.5)  gm/dL


 


Potassium    5.3 H  (3.5-5.1)  mmol/L


 


BUN    33 H  (9-20)  mg/dL


 


Glucose    107 H  (74-99)  mg/dL


 


POC Glucose (mg/dL)     (75-99)  mg/dL


 


Calcium     (8.4-10.2)  mg/dL


 


Magnesium    2.6 H  (1.6-2.3)  mg/dL


 


Alkaline Phosphatase    29 L  ()  U/L


 


Total Protein    4.9 L  (6.3-8.2)  g/dL


 


Albumin    3.2 L  (3.5-5.0)  g/dL


 


Arterial Blood Potassium     (3.4-4.5)  mmol/L


 


Arterial Blood Glucose     (75-99)  mg/dL


 


Crossmatch     














  06/12/18 06/12/18 06/12/18 Range/Units





  03:55 04:58 06:05 


 


WBC     (3.8-10.6)  k/uL


 


RBC     (4.30-5.90)  m/uL


 


Hgb     (13.0-17.5)  gm/dL


 


Hct     (39.0-53.0)  %


 


Plt Count     (150-450)  k/uL


 


Neutrophils #     (1.3-7.7)  k/uL


 


Lymphocytes #     (1.0-4.8)  k/uL


 


Monocytes #     (0-1.0)  k/uL


 


PT     (9.0-12.0)  sec


 


INR     (<1.2)  


 


ABG pH     (7.35-7.45)  


 


ABG pCO2     (35-45)  mmHg


 


ABG pO2     ()  mmHg


 


ABG HCO3     (21-25)  mmol/L


 


ABG Total CO2     (19-24)  mmol/L


 


ABG O2 Saturation     (94-97)  %


 


ABG Hematocrit     (34.0-46.0)  %


 


ABG Sodium     (135-146)  mmol/L


 


ABG Potassium     (3.4-4.5)  mmol/L


 


ABG Ionized Calcium     (4.5-5.3)  mg/dL


 


ABG Glucose     (75-99)  mg/dL


 


ABG Lactic Acid     (0.5-1.6)  mmol/L


 


Hemoglobin     (13.0-17.5)  gm/dL


 


Potassium     (3.5-5.1)  mmol/L


 


BUN     (9-20)  mg/dL


 


Glucose     (74-99)  mg/dL


 


POC Glucose (mg/dL)  111 H  139 H  156 H  (75-99)  mg/dL


 


Calcium     (8.4-10.2)  mg/dL


 


Magnesium     (1.6-2.3)  mg/dL


 


Alkaline Phosphatase     ()  U/L


 


Total Protein     (6.3-8.2)  g/dL


 


Albumin     (3.5-5.0)  g/dL


 


Arterial Blood Potassium     (3.4-4.5)  mmol/L


 


Arterial Blood Glucose     (75-99)  mg/dL


 


Crossmatch     














  06/12/18 06/12/18 Range/Units





  07:36 08:56 


 


WBC    (3.8-10.6)  k/uL


 


RBC    (4.30-5.90)  m/uL


 


Hgb    (13.0-17.5)  gm/dL


 


Hct    (39.0-53.0)  %


 


Plt Count    (150-450)  k/uL


 


Neutrophils #    (1.3-7.7)  k/uL


 


Lymphocytes #    (1.0-4.8)  k/uL


 


Monocytes #    (0-1.0)  k/uL


 


PT    (9.0-12.0)  sec


 


INR    (<1.2)  


 


ABG pH    (7.35-7.45)  


 


ABG pCO2    (35-45)  mmHg


 


ABG pO2    ()  mmHg


 


ABG HCO3    (21-25)  mmol/L


 


ABG Total CO2    (19-24)  mmol/L


 


ABG O2 Saturation    (94-97)  %


 


ABG Hematocrit    (34.0-46.0)  %


 


ABG Sodium    (135-146)  mmol/L


 


ABG Potassium    (3.4-4.5)  mmol/L


 


ABG Ionized Calcium    (4.5-5.3)  mg/dL


 


ABG Glucose    (75-99)  mg/dL


 


ABG Lactic Acid    (0.5-1.6)  mmol/L


 


Hemoglobin    (13.0-17.5)  gm/dL


 


Potassium    (3.5-5.1)  mmol/L


 


BUN    (9-20)  mg/dL


 


Glucose    (74-99)  mg/dL


 


POC Glucose (mg/dL)  128 H  118 H  (75-99)  mg/dL


 


Calcium    (8.4-10.2)  mg/dL


 


Magnesium    (1.6-2.3)  mg/dL


 


Alkaline Phosphatase    ()  U/L


 


Total Protein    (6.3-8.2)  g/dL


 


Albumin    (3.5-5.0)  g/dL


 


Arterial Blood Potassium    (3.4-4.5)  mmol/L


 


Arterial Blood Glucose    (75-99)  mg/dL


 


Crossmatch    








 Microbiology - Last 24 Hours (Table)











 06/11/18 04:56 Gram Stain - Preliminary





 Sputum Sputum Culture - Preliminary














Assessment and Plan


Plan: 


Assessment:





Acute non-ST segment elevation myocardial infarction, secondary to severe triple

-vessel CAD, status post quadruple coronary artery bypass grafting with LIMA to 

LAD, reverse SVG to OM1, reverse SVG to OM 2, and reverse SVG to RCA, and 

exclusion of the left atrial appendage with AtriCure clip, postop day 1





Severe COPD, stage III disease, with an FEV1 that is 45% of predicted





Chronic hypoxemic respiratory failure





Chronic atrial fibrillation





Type 2 diabetes mellitus





Diabetic neuropathy





PVOD





Osteomyelitis





History of hypertension





History of systolic heart failure.








Plan: 





Patient has been successfully extubated and under 6 hours after or exit time, 

doing well this morning on 40% Ventimask, continue weaning FiO2, encourage 

incentive spirometry, early ambulation.  Pain control.  Tolerating extubate 

patient well.  Continue monitoring  hemodynamics, chest tube output, labs, 

urine output.  Solu-Medrol has been switched to oral prednisone.  Patient was 

given a dose of IV Lasix per to CT surgery, continue nebulized bronchodilators, 

we'll continue to follow





I performed a history & physical examination of the patient and discussed their 

management with my nurse practitioner, Keeley Sefl.  I reviewed the nurse 

practitioner's note and agree with the documented findings and plan of care.  

Lung sounds are diminished on the right, with the few crackles on the left.  

The findings and the impression was discussed with the patient.  I attest to 

the documentation by the nurse practitioner. 


Time with Patient: Greater than 30

## 2018-06-12 NOTE — P.PN
Subjective


Progress Note Date: 06/12/18


Principal diagnosis: 





Symptomatic multivessel coronary artery disease with left main disease, 

hypertension, severe COPD with an FEV1 of 45% predicted and home oxygen use, 

chronic diastolic congestive heart failure, osteoarthritis, lumbar disc disease 

with chronic pain syndrome, history of deep vein thrombosis of his left leg, 

recurrent feet and hand ulcerations with osteomyelitis and history of MRSA 

requiring amputations, type 2 diabetes mellitus, peripheral vascular disease, 

remote history of pneumonia, non-ST elevated myocardial infarction this 

admission, family history of early onset coronary artery disease with his 

brother being diagnosed with a myocardial infarction at age 46, history of sick 

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 

hypertrophy, history of chronic persistent atrial fibrillation on home Coumadin 

therapy, remote history of smoking quit over 30 years ago, GERD, and a 

preoperative nasal swab positive for MSSA.





POD #1 quadruple coronary artery bypass grafting using the left internal 

mammary artery to the left anterior descending coronary artery, a reverse 

greater saphenous vein graft from the aorta to the first obtuse marginal 

coronary artery, a reverse greater saphenous vein graft from the aorta to the 

second obtuse marginal coronary artery, a reverse greater saphenous vein graft 

from the aorta to the right coronary artery.  Exclusion of the left atrial 

appendage using a 40 mm AtriClip.  Endoscopic harvesting of the right greater 

saphenous vein, intraoperative transesophageal echocardiogram and epi-aortic 

scanning.  Intraoperative graft flow measurements using the [employer] system.





Postoperative normocytic, normochromic anemia an expected outcome of surgery 

related to hemodilution.





Patient is sitting up to the bedside chair.  He is in no acute distress.  He 

was successfully extubated at 8:52 PM last night.  He is currently on a 40% 

Ventimask with oxygen saturations at 97%.  He is achieving 750 mL on his 

incentive spirometry with encouragement.  He is complaining of pain to his 

chest tube insertion sites rating his pain 3 out of 10 on the pain scale.  He 

is complaining of a nonproductive loose cough.





Objective





- Vital Signs


Vital signs: 


 Vital Signs











Temp  98.1 F   06/12/18 04:00


 


Pulse  92   06/12/18 07:00


 


Resp  12   06/12/18 07:00


 


BP  123/56   06/11/18 06:00


 


Pulse Ox  98   06/12/18 07:00








 Intake & Output











 06/11/18 06/12/18 06/12/18





 18:59 06:59 18:59


 


Intake Total 403 1308.716 89


 


Output Total 2906 1204 85


 


Balance -2503 104.716 4


 


Weight  80.2 kg 


 


Intake:   


 


   1289 89


 


    ACETAMINOPHEN IV (For   





    ) 1,000 mg In Empty Bag 1   





    bag @ 400 mls/hr IVPB   





    Q6HR [facility] Rx#:[id number]   


 


    Albumin Human 5% 250 ml  250 





    In Empty Bag 1 bag @ 250   





    mls/hr IVPB Q1HR PRN Rx#:   





    [id number]   


 


    Calcium Chloride 1,000 mg 100  





    In Sodium Chloride 0.9%   





    100 ml @ 100 mls/hr IV   





    ONCE PRN Rx#:[id number]   


 


    Cardiac Index/Output  360 30


 


    Lactated Ringers 1,000 ml 200 550 50





    @ 20 mls/hr IV .Q24H [facility]   





    Rx#:[id number]   


 


    Pressure bag  99 9


 


    ceFAZolin 2 gm In Sodium  30 





    Chloride 0.9% 30 ml @ 60   





    mls/hr IVPB ONCE ONE Rx#:   





    [id number]   


 


  Intake, IV Titration  19.716 





  Amount   


 


    Clevidipine Butyrate 25  5.5 





    mg In Empty Bag 1 bag @ 1   





    MG/HR 2 mls/hr IV .Q24H   





    [facility]ONDA Rx#:[id number]   


 


    Insulin Regular 100 unit  14.216 





    In Sodium Chloride 0.9%   





    100 ml @ Per Protocol IV   





    .Q0M [facility]ONDA Rx#:[id number]   


 


Output:   


 


  Chest Tube Drainage 344 727 40


 


    Left Pleural Chest Tube 184 473 30


 


    Mediastinal Chest Tube 160 254 10


 


  Urine 562 477 45


 


  Estimated Blood Loss 2000  


 


Other:   


 


  Voiding Method Indwelling Catheter Indwelling Catheter 








 ABP, PAP, CO, CI - Last Documented











Arterial Blood Pressure        133/45


 


Pulmonary Artery Pressure      29/11


 


Cardiac Output                 5.1


 


Cardiac Index                  3.0

















- Constitutional


General appearance: Present: cooperative, no acute distress





- Neck


Details: 





Right IJ Cordis in place with Carlisle-Pam catheter patent and functioning.





- Respiratory


Details: 





Lung sounds essentially clear and diminished throughout.  Respirations are 

symmetrical and nonlabored.  Oxygen saturation are 97% on a 40% FiO2 Ventimask.

  He is achieving 750 mL on his incentive spirometry.  Mediastinal and left 

pleural chest tubes without air leak.  Chest tubes are to low continuous wall 

suction -20 cm H2O.  They are draining thin serosanguineous drainage.  

Mediastinal chest tube with 140 mL output in the last 8 hours, 450 mL output 

since surgery.  Left pleural chest tube with 220 mL output in the last 8 hours, 

620 mL output in the last 24 hours.





- Cardiovascular


Details: 





Regular rhythm and rate.  S1 and S2 present, negative for S3, gallop or murmur.

  Sternum is stable.  Bedside telemetry showing accelerated junctional rhythm 

with heart rate 91.  Right IJ Cordis and Carlisle-Pam in place and functioning.  

Current cardiac output 5.1, cardiac index 2.7, CVP 8, PA pressures 41/18 on no 

inotropic support.  Ventricular epicardial pacemaker wires intact and connected 

to backup generator.  Knee-high ROCIO hose and sequential compression devices in 

place to his bilateral lower extremities.  No edema present.  Heart hugger is 

in place.





- Gastrointestinal


Gastrointestinal Comment(s): 





Abdomen is soft, nontender and nondistended.  Hypoactive bowel sounds to all 4 

abdominal quadrants.  Tolerating oral intake.





- Genitourinary


Genitourinary Comment(s): 





Toth catheter for accurate I&O.  Draining clear yellow urine.  340 mL output 

in the last 8 hours.





- Integumentary


Integumentary Comment(s): 





Skin is warm and dry.  No clubbing or cyanosis present.  Midline sternal 

incision clean dry and approximated.  No drainage or redness present.  Right 

leg EVH sites clean dry and approximated.  No drainage or redness present.  JOCE 

drain in place to his right lower extremity draining thin serosanguineous 

drainage.  35 mL output since surgery.





- Neurologic


Neurologic: Present: CNII-XII intact, focal deficits





- Musculoskeletal


Musculoskeletal: Present: generalized weakness, strength equal bilaterally





- Psychiatric


Psychiatric Comment(s): 





Periods of forgetfulness.


Psychiatric: Present: A&O x's 3, appropriate affect, intact judgment & insight





- Allied health notes


Allied health notes reviewed: nursing





- Labs


CBC & Chem 7: 


 06/12/18 03:55





 06/12/18 03:55


Labs: 


 Abnormal Lab Results - Last 24 Hours (Table)











  06/10/18 06/11/18 06/11/18 Range/Units





  11:16 07:30 08:48 


 


WBC     (3.8-10.6)  k/uL


 


RBC     (4.30-5.90)  m/uL


 


Hgb     (13.0-17.5)  gm/dL


 


Hct     (39.0-53.0)  %


 


Plt Count     (150-450)  k/uL


 


Neutrophils #     (1.3-7.7)  k/uL


 


Lymphocytes #     (1.0-4.8)  k/uL


 


Monocytes #     (0-1.0)  k/uL


 


PT     (9.0-12.0)  sec


 


INR     (<1.2)  


 


ABG pH     (7.35-7.45)  


 


ABG pCO2     (35-45)  mmHg


 


ABG pO2   62 L  231 H  ()  mmHg


 


ABG HCO3   29 H  29 H  (21-25)  mmol/L


 


ABG Total CO2    31 H  (19-24)  mmol/L


 


ABG O2 Saturation   93.2 L  99.9 H  (94-97)  %


 


ABG Hematocrit     (34.0-46.0)  %


 


ABG Sodium     (135-146)  mmol/L


 


ABG Potassium     (3.4-4.5)  mmol/L


 


ABG Ionized Calcium     (4.5-5.3)  mg/dL


 


ABG Glucose    127 H  (75-99)  mg/dL


 


ABG Lactic Acid    2.1 H  (0.5-1.6)  mmol/L


 


Hemoglobin    12.8 L  (13.0-17.5)  gm/dL


 


Potassium     (3.5-5.1)  mmol/L


 


BUN     (9-20)  mg/dL


 


Glucose     (74-99)  mg/dL


 


POC Glucose (mg/dL)     (75-99)  mg/dL


 


Calcium     (8.4-10.2)  mg/dL


 


Magnesium     (1.6-2.3)  mg/dL


 


Alkaline Phosphatase     ()  U/L


 


Total Protein     (6.3-8.2)  g/dL


 


Albumin     (3.5-5.0)  g/dL


 


Arterial Blood Potassium     (3.4-4.5)  mmol/L


 


Arterial Blood Glucose    127 H  (75-99)  mg/dL


 


Crossmatch  See Detail    














  06/11/18 06/11/18 06/11/18 Range/Units





  10:19 11:02 11:31 


 


WBC     (3.8-10.6)  k/uL


 


RBC     (4.30-5.90)  m/uL


 


Hgb     (13.0-17.5)  gm/dL


 


Hct     (39.0-53.0)  %


 


Plt Count     (150-450)  k/uL


 


Neutrophils #     (1.3-7.7)  k/uL


 


Lymphocytes #     (1.0-4.8)  k/uL


 


Monocytes #     (0-1.0)  k/uL


 


PT     (9.0-12.0)  sec


 


INR     (<1.2)  


 


ABG pH    7.46 H  (7.35-7.45)  


 


ABG pCO2  54 H  48 H   (35-45)  mmHg


 


ABG pO2  214 H  >420 H  276 H  ()  mmHg


 


ABG HCO3  30 H  28 H  28 H  (21-25)  mmol/L


 


ABG Total CO2  32 H  30 H  29 H  (19-24)  mmol/L


 


ABG O2 Saturation  99.6 H  100.0 H  100.0 H  (94-97)  %


 


ABG Hematocrit   28 L  26 L  (34.0-46.0)  %


 


ABG Sodium   134 L  132 L  (135-146)  mmol/L


 


ABG Potassium    5.6 H  (3.4-4.5)  mmol/L


 


ABG Ionized Calcium   4.2 L  4.1 L  (4.5-5.3)  mg/dL


 


ABG Glucose  158 H  149 H  244 H  (75-99)  mg/dL


 


ABG Lactic Acid  1.7 H  1.9 H  2.3 H*  (0.5-1.6)  mmol/L


 


Hemoglobin  12.4 L  9.2 L  8.5 L  (13.0-17.5)  gm/dL


 


Potassium     (3.5-5.1)  mmol/L


 


BUN     (9-20)  mg/dL


 


Glucose     (74-99)  mg/dL


 


POC Glucose (mg/dL)     (75-99)  mg/dL


 


Calcium     (8.4-10.2)  mg/dL


 


Magnesium     (1.6-2.3)  mg/dL


 


Alkaline Phosphatase     ()  U/L


 


Total Protein     (6.3-8.2)  g/dL


 


Albumin     (3.5-5.0)  g/dL


 


Arterial Blood Potassium    5.6 H  (3.4-4.5)  mmol/L


 


Arterial Blood Glucose  158 H  149 H  244 H  (75-99)  mg/dL


 


Crossmatch     














  06/11/18 06/11/18 06/11/18 Range/Units





  12:05 12:38 13:58 


 


WBC     (3.8-10.6)  k/uL


 


RBC     (4.30-5.90)  m/uL


 


Hgb     (13.0-17.5)  gm/dL


 


Hct     (39.0-53.0)  %


 


Plt Count     (150-450)  k/uL


 


Neutrophils #     (1.3-7.7)  k/uL


 


Lymphocytes #     (1.0-4.8)  k/uL


 


Monocytes #     (0-1.0)  k/uL


 


PT     (9.0-12.0)  sec


 


INR     (<1.2)  


 


ABG pH     (7.35-7.45)  


 


ABG pCO2    46 H  (35-45)  mmHg


 


ABG pO2  259 H  309 H  209 H  ()  mmHg


 


ABG HCO3  28 H  27 H  27 H  (21-25)  mmol/L


 


ABG Total CO2  29 H  29 H  28 H  (19-24)  mmol/L


 


ABG O2 Saturation  99.9 H  100.0 H  99.7 H  (94-97)  %


 


ABG Hematocrit  26 L  25 L  27 L  (34.0-46.0)  %


 


ABG Sodium  134 L  133 L   (135-146)  mmol/L


 


ABG Potassium  5.3 H  4.9 H   (3.4-4.5)  mmol/L


 


ABG Ionized Calcium  4.2 L  4.2 L   (4.5-5.3)  mg/dL


 


ABG Glucose  259 H  292 H  190 H  (75-99)  mg/dL


 


ABG Lactic Acid  2.8 H*  3.3 H*  2.7 H*  (0.5-1.6)  mmol/L


 


Hemoglobin  8.4 L  8.1 L  8.9 L  (13.0-17.5)  gm/dL


 


Potassium     (3.5-5.1)  mmol/L


 


BUN     (9-20)  mg/dL


 


Glucose     (74-99)  mg/dL


 


POC Glucose (mg/dL)     (75-99)  mg/dL


 


Calcium     (8.4-10.2)  mg/dL


 


Magnesium     (1.6-2.3)  mg/dL


 


Alkaline Phosphatase     ()  U/L


 


Total Protein     (6.3-8.2)  g/dL


 


Albumin     (3.5-5.0)  g/dL


 


Arterial Blood Potassium  5.3 H  4.9 H   (3.4-4.5)  mmol/L


 


Arterial Blood Glucose  259 H  292 H  190 H  (75-99)  mg/dL


 


Crossmatch     














  06/11/18 06/11/18 06/11/18 Range/Units





  15:00 15:00 15:00 


 


WBC  23.7 H    (3.8-10.6)  k/uL


 


RBC  3.26 L    (4.30-5.90)  m/uL


 


Hgb  9.4 L D    (13.0-17.5)  gm/dL


 


Hct  28.7 L    (39.0-53.0)  %


 


Plt Count  115 L    (150-450)  k/uL


 


Neutrophils #  21.6 H    (1.3-7.7)  k/uL


 


Lymphocytes #  0.9 L    (1.0-4.8)  k/uL


 


Monocytes #  1.1 H    (0-1.0)  k/uL


 


PT    13.9 H  (9.0-12.0)  sec


 


INR    1.5 H  (<1.2)  


 


ABG pH     (7.35-7.45)  


 


ABG pCO2     (35-45)  mmHg


 


ABG pO2     ()  mmHg


 


ABG HCO3     (21-25)  mmol/L


 


ABG Total CO2     (19-24)  mmol/L


 


ABG O2 Saturation     (94-97)  %


 


ABG Hematocrit     (34.0-46.0)  %


 


ABG Sodium     (135-146)  mmol/L


 


ABG Potassium     (3.4-4.5)  mmol/L


 


ABG Ionized Calcium     (4.5-5.3)  mg/dL


 


ABG Glucose     (75-99)  mg/dL


 


ABG Lactic Acid     (0.5-1.6)  mmol/L


 


Hemoglobin     (13.0-17.5)  gm/dL


 


Potassium     (3.5-5.1)  mmol/L


 


BUN   24 H   (9-20)  mg/dL


 


Glucose   110 H   (74-99)  mg/dL


 


POC Glucose (mg/dL)     (75-99)  mg/dL


 


Calcium   7.9 L   (8.4-10.2)  mg/dL


 


Magnesium   2.9 H   (1.6-2.3)  mg/dL


 


Alkaline Phosphatase   32 L   ()  U/L


 


Total Protein   4.5 L   (6.3-8.2)  g/dL


 


Albumin   2.9 L   (3.5-5.0)  g/dL


 


Arterial Blood Potassium     (3.4-4.5)  mmol/L


 


Arterial Blood Glucose     (75-99)  mg/dL


 


Crossmatch     














  06/11/18 06/11/18 06/11/18 Range/Units





  15:19 15:37 16:41 


 


WBC     (3.8-10.6)  k/uL


 


RBC     (4.30-5.90)  m/uL


 


Hgb     (13.0-17.5)  gm/dL


 


Hct     (39.0-53.0)  %


 


Plt Count     (150-450)  k/uL


 


Neutrophils #     (1.3-7.7)  k/uL


 


Lymphocytes #     (1.0-4.8)  k/uL


 


Monocytes #     (0-1.0)  k/uL


 


PT     (9.0-12.0)  sec


 


INR     (<1.2)  


 


ABG pH   7.31 L   (7.35-7.45)  


 


ABG pCO2   58 H   (35-45)  mmHg


 


ABG pO2   >400 H   ()  mmHg


 


ABG HCO3   29 H   (21-25)  mmol/L


 


ABG Total CO2   31 H   (19-24)  mmol/L


 


ABG O2 Saturation   100.0 H   (94-97)  %


 


ABG Hematocrit     (34.0-46.0)  %


 


ABG Sodium     (135-146)  mmol/L


 


ABG Potassium     (3.4-4.5)  mmol/L


 


ABG Ionized Calcium     (4.5-5.3)  mg/dL


 


ABG Glucose     (75-99)  mg/dL


 


ABG Lactic Acid     (0.5-1.6)  mmol/L


 


Hemoglobin     (13.0-17.5)  gm/dL


 


Potassium     (3.5-5.1)  mmol/L


 


BUN     (9-20)  mg/dL


 


Glucose     (74-99)  mg/dL


 


POC Glucose (mg/dL)  122 H   74 L  (75-99)  mg/dL


 


Calcium     (8.4-10.2)  mg/dL


 


Magnesium     (1.6-2.3)  mg/dL


 


Alkaline Phosphatase     ()  U/L


 


Total Protein     (6.3-8.2)  g/dL


 


Albumin     (3.5-5.0)  g/dL


 


Arterial Blood Potassium     (3.4-4.5)  mmol/L


 


Arterial Blood Glucose     (75-99)  mg/dL


 


Crossmatch     














  06/11/18 06/11/18 06/11/18 Range/Units





  17:51 18:54 19:58 


 


WBC  19.8 H    (3.8-10.6)  k/uL


 


RBC  3.24 L    (4.30-5.90)  m/uL


 


Hgb  9.4 L    (13.0-17.5)  gm/dL


 


Hct  28.5 L    (39.0-53.0)  %


 


Plt Count  94 L    (150-450)  k/uL


 


Neutrophils #  16.7 H    (1.3-7.7)  k/uL


 


Lymphocytes #  0.8 L    (1.0-4.8)  k/uL


 


Monocytes #  1.8 H    (0-1.0)  k/uL


 


PT     (9.0-12.0)  sec


 


INR     (<1.2)  


 


ABG pH     (7.35-7.45)  


 


ABG pCO2     (35-45)  mmHg


 


ABG pO2     ()  mmHg


 


ABG HCO3     (21-25)  mmol/L


 


ABG Total CO2     (19-24)  mmol/L


 


ABG O2 Saturation     (94-97)  %


 


ABG Hematocrit     (34.0-46.0)  %


 


ABG Sodium     (135-146)  mmol/L


 


ABG Potassium     (3.4-4.5)  mmol/L


 


ABG Ionized Calcium     (4.5-5.3)  mg/dL


 


ABG Glucose     (75-99)  mg/dL


 


ABG Lactic Acid     (0.5-1.6)  mmol/L


 


Hemoglobin     (13.0-17.5)  gm/dL


 


Potassium     (3.5-5.1)  mmol/L


 


BUN     (9-20)  mg/dL


 


Glucose     (74-99)  mg/dL


 


POC Glucose (mg/dL)   131 H  152 H  (75-99)  mg/dL


 


Calcium     (8.4-10.2)  mg/dL


 


Magnesium     (1.6-2.3)  mg/dL


 


Alkaline Phosphatase     ()  U/L


 


Total Protein     (6.3-8.2)  g/dL


 


Albumin     (3.5-5.0)  g/dL


 


Arterial Blood Potassium     (3.4-4.5)  mmol/L


 


Arterial Blood Glucose     (75-99)  mg/dL


 


Crossmatch     














  06/11/18 06/11/18 06/11/18 Range/Units





  20:00 20:00 20:00 


 


WBC  19.9 H    (3.8-10.6)  k/uL


 


RBC  3.39 L    (4.30-5.90)  m/uL


 


Hgb  9.8 L    (13.0-17.5)  gm/dL


 


Hct  30.0 L    (39.0-53.0)  %


 


Plt Count  126 L    (150-450)  k/uL


 


Neutrophils #  17.9 H    (1.3-7.7)  k/uL


 


Lymphocytes #  0.6 L    (1.0-4.8)  k/uL


 


Monocytes #  1.2 H    (0-1.0)  k/uL


 


PT     (9.0-12.0)  sec


 


INR     (<1.2)  


 


ABG pH     (7.35-7.45)  


 


ABG pCO2     (35-45)  mmHg


 


ABG pO2     ()  mmHg


 


ABG HCO3     (21-25)  mmol/L


 


ABG Total CO2     (19-24)  mmol/L


 


ABG O2 Saturation     (94-97)  %


 


ABG Hematocrit     (34.0-46.0)  %


 


ABG Sodium     (135-146)  mmol/L


 


ABG Potassium     (3.4-4.5)  mmol/L


 


ABG Ionized Calcium     (4.5-5.3)  mg/dL


 


ABG Glucose     (75-99)  mg/dL


 


ABG Lactic Acid     (0.5-1.6)  mmol/L


 


Hemoglobin     (13.0-17.5)  gm/dL


 


Potassium     (3.5-5.1)  mmol/L


 


BUN   28 H   (9-20)  mg/dL


 


Glucose   146 H   (74-99)  mg/dL


 


POC Glucose (mg/dL)     (75-99)  mg/dL


 


Calcium     (8.4-10.2)  mg/dL


 


Magnesium    2.7 H  (1.6-2.3)  mg/dL


 


Alkaline Phosphatase     ()  U/L


 


Total Protein     (6.3-8.2)  g/dL


 


Albumin     (3.5-5.0)  g/dL


 


Arterial Blood Potassium     (3.4-4.5)  mmol/L


 


Arterial Blood Glucose     (75-99)  mg/dL


 


Crossmatch     














  06/11/18 06/11/18 06/11/18 Range/Units





  20:32 21:07 21:53 


 


WBC     (3.8-10.6)  k/uL


 


RBC     (4.30-5.90)  m/uL


 


Hgb     (13.0-17.5)  gm/dL


 


Hct     (39.0-53.0)  %


 


Plt Count     (150-450)  k/uL


 


Neutrophils #     (1.3-7.7)  k/uL


 


Lymphocytes #     (1.0-4.8)  k/uL


 


Monocytes #     (0-1.0)  k/uL


 


PT     (9.0-12.0)  sec


 


INR     (<1.2)  


 


ABG pH     (7.35-7.45)  


 


ABG pCO2  47 H    (35-45)  mmHg


 


ABG pO2  73 L    ()  mmHg


 


ABG HCO3  28 H    (21-25)  mmol/L


 


ABG Total CO2  30 H    (19-24)  mmol/L


 


ABG O2 Saturation     (94-97)  %


 


ABG Hematocrit     (34.0-46.0)  %


 


ABG Sodium     (135-146)  mmol/L


 


ABG Potassium     (3.4-4.5)  mmol/L


 


ABG Ionized Calcium     (4.5-5.3)  mg/dL


 


ABG Glucose     (75-99)  mg/dL


 


ABG Lactic Acid     (0.5-1.6)  mmol/L


 


Hemoglobin     (13.0-17.5)  gm/dL


 


Potassium     (3.5-5.1)  mmol/L


 


BUN     (9-20)  mg/dL


 


Glucose     (74-99)  mg/dL


 


POC Glucose (mg/dL)   161 H  153 H  (75-99)  mg/dL


 


Calcium     (8.4-10.2)  mg/dL


 


Magnesium     (1.6-2.3)  mg/dL


 


Alkaline Phosphatase     ()  U/L


 


Total Protein     (6.3-8.2)  g/dL


 


Albumin     (3.5-5.0)  g/dL


 


Arterial Blood Potassium     (3.4-4.5)  mmol/L


 


Arterial Blood Glucose     (75-99)  mg/dL


 


Crossmatch     














  06/11/18 06/12/18 06/12/18 Range/Units





  23:08 00:11 01:03 


 


WBC     (3.8-10.6)  k/uL


 


RBC     (4.30-5.90)  m/uL


 


Hgb     (13.0-17.5)  gm/dL


 


Hct     (39.0-53.0)  %


 


Plt Count     (150-450)  k/uL


 


Neutrophils #     (1.3-7.7)  k/uL


 


Lymphocytes #     (1.0-4.8)  k/uL


 


Monocytes #     (0-1.0)  k/uL


 


PT     (9.0-12.0)  sec


 


INR     (<1.2)  


 


ABG pH     (7.35-7.45)  


 


ABG pCO2     (35-45)  mmHg


 


ABG pO2     ()  mmHg


 


ABG HCO3     (21-25)  mmol/L


 


ABG Total CO2     (19-24)  mmol/L


 


ABG O2 Saturation     (94-97)  %


 


ABG Hematocrit     (34.0-46.0)  %


 


ABG Sodium     (135-146)  mmol/L


 


ABG Potassium     (3.4-4.5)  mmol/L


 


ABG Ionized Calcium     (4.5-5.3)  mg/dL


 


ABG Glucose     (75-99)  mg/dL


 


ABG Lactic Acid     (0.5-1.6)  mmol/L


 


Hemoglobin     (13.0-17.5)  gm/dL


 


Potassium     (3.5-5.1)  mmol/L


 


BUN     (9-20)  mg/dL


 


Glucose     (74-99)  mg/dL


 


POC Glucose (mg/dL)  134 H  125 H  121 H  (75-99)  mg/dL


 


Calcium     (8.4-10.2)  mg/dL


 


Magnesium     (1.6-2.3)  mg/dL


 


Alkaline Phosphatase     ()  U/L


 


Total Protein     (6.3-8.2)  g/dL


 


Albumin     (3.5-5.0)  g/dL


 


Arterial Blood Potassium     (3.4-4.5)  mmol/L


 


Arterial Blood Glucose     (75-99)  mg/dL


 


Crossmatch     














  06/12/18 06/12/18 06/12/18 Range/Units





  02:09 02:59 03:55 


 


WBC    11.2 H  (3.8-10.6)  k/uL


 


RBC    3.11 L  (4.30-5.90)  m/uL


 


Hgb    9.0 L  (13.0-17.5)  gm/dL


 


Hct    27.5 L  (39.0-53.0)  %


 


Plt Count    84 L  (150-450)  k/uL


 


Neutrophils #    9.6 H  (1.3-7.7)  k/uL


 


Lymphocytes #    0.7 L  (1.0-4.8)  k/uL


 


Monocytes #     (0-1.0)  k/uL


 


PT     (9.0-12.0)  sec


 


INR     (<1.2)  


 


ABG pH     (7.35-7.45)  


 


ABG pCO2     (35-45)  mmHg


 


ABG pO2     ()  mmHg


 


ABG HCO3     (21-25)  mmol/L


 


ABG Total CO2     (19-24)  mmol/L


 


ABG O2 Saturation     (94-97)  %


 


ABG Hematocrit     (34.0-46.0)  %


 


ABG Sodium     (135-146)  mmol/L


 


ABG Potassium     (3.4-4.5)  mmol/L


 


ABG Ionized Calcium     (4.5-5.3)  mg/dL


 


ABG Glucose     (75-99)  mg/dL


 


ABG Lactic Acid     (0.5-1.6)  mmol/L


 


Hemoglobin     (13.0-17.5)  gm/dL


 


Potassium     (3.5-5.1)  mmol/L


 


BUN     (9-20)  mg/dL


 


Glucose     (74-99)  mg/dL


 


POC Glucose (mg/dL)  140 H  118 H   (75-99)  mg/dL


 


Calcium     (8.4-10.2)  mg/dL


 


Magnesium     (1.6-2.3)  mg/dL


 


Alkaline Phosphatase     ()  U/L


 


Total Protein     (6.3-8.2)  g/dL


 


Albumin     (3.5-5.0)  g/dL


 


Arterial Blood Potassium     (3.4-4.5)  mmol/L


 


Arterial Blood Glucose     (75-99)  mg/dL


 


Crossmatch     














  06/12/18 06/12/18 06/12/18 Range/Units





  03:55 03:55 03:55 


 


WBC     (3.8-10.6)  k/uL


 


RBC     (4.30-5.90)  m/uL


 


Hgb     (13.0-17.5)  gm/dL


 


Hct     (39.0-53.0)  %


 


Plt Count     (150-450)  k/uL


 


Neutrophils #     (1.3-7.7)  k/uL


 


Lymphocytes #     (1.0-4.8)  k/uL


 


Monocytes #     (0-1.0)  k/uL


 


PT  12.8 H    (9.0-12.0)  sec


 


INR  1.4 H    (<1.2)  


 


ABG pH     (7.35-7.45)  


 


ABG pCO2     (35-45)  mmHg


 


ABG pO2     ()  mmHg


 


ABG HCO3     (21-25)  mmol/L


 


ABG Total CO2     (19-24)  mmol/L


 


ABG O2 Saturation     (94-97)  %


 


ABG Hematocrit     (34.0-46.0)  %


 


ABG Sodium     (135-146)  mmol/L


 


ABG Potassium     (3.4-4.5)  mmol/L


 


ABG Ionized Calcium     (4.5-5.3)  mg/dL


 


ABG Glucose     (75-99)  mg/dL


 


ABG Lactic Acid     (0.5-1.6)  mmol/L


 


Hemoglobin     (13.0-17.5)  gm/dL


 


Potassium   5.3 H   (3.5-5.1)  mmol/L


 


BUN   33 H   (9-20)  mg/dL


 


Glucose   107 H   (74-99)  mg/dL


 


POC Glucose (mg/dL)    111 H  (75-99)  mg/dL


 


Calcium     (8.4-10.2)  mg/dL


 


Magnesium   2.6 H   (1.6-2.3)  mg/dL


 


Alkaline Phosphatase   29 L   ()  U/L


 


Total Protein   4.9 L   (6.3-8.2)  g/dL


 


Albumin   3.2 L   (3.5-5.0)  g/dL


 


Arterial Blood Potassium     (3.4-4.5)  mmol/L


 


Arterial Blood Glucose     (75-99)  mg/dL


 


Crossmatch     














  06/12/18 06/12/18 Range/Units





  04:58 06:05 


 


WBC    (3.8-10.6)  k/uL


 


RBC    (4.30-5.90)  m/uL


 


Hgb    (13.0-17.5)  gm/dL


 


Hct    (39.0-53.0)  %


 


Plt Count    (150-450)  k/uL


 


Neutrophils #    (1.3-7.7)  k/uL


 


Lymphocytes #    (1.0-4.8)  k/uL


 


Monocytes #    (0-1.0)  k/uL


 


PT    (9.0-12.0)  sec


 


INR    (<1.2)  


 


ABG pH    (7.35-7.45)  


 


ABG pCO2    (35-45)  mmHg


 


ABG pO2    ()  mmHg


 


ABG HCO3    (21-25)  mmol/L


 


ABG Total CO2    (19-24)  mmol/L


 


ABG O2 Saturation    (94-97)  %


 


ABG Hematocrit    (34.0-46.0)  %


 


ABG Sodium    (135-146)  mmol/L


 


ABG Potassium    (3.4-4.5)  mmol/L


 


ABG Ionized Calcium    (4.5-5.3)  mg/dL


 


ABG Glucose    (75-99)  mg/dL


 


ABG Lactic Acid    (0.5-1.6)  mmol/L


 


Hemoglobin    (13.0-17.5)  gm/dL


 


Potassium    (3.5-5.1)  mmol/L


 


BUN    (9-20)  mg/dL


 


Glucose    (74-99)  mg/dL


 


POC Glucose (mg/dL)  139 H  156 H  (75-99)  mg/dL


 


Calcium    (8.4-10.2)  mg/dL


 


Magnesium    (1.6-2.3)  mg/dL


 


Alkaline Phosphatase    ()  U/L


 


Total Protein    (6.3-8.2)  g/dL


 


Albumin    (3.5-5.0)  g/dL


 


Arterial Blood Potassium    (3.4-4.5)  mmol/L


 


Arterial Blood Glucose    (75-99)  mg/dL


 


Crossmatch    








 Microbiology - Last 24 Hours (Table)











 06/11/18 04:56 Gram Stain - Preliminary





 Sputum Sputum Culture - Preliminary














- Imaging and Cardiology


Chest x-ray: image reviewed





Assessment and Plan


(1) Hypertension


Current Visit: Yes   Status: Acute   Code(s): I10 - ESSENTIAL (PRIMARY) 

HYPERTENSION   SNOMED Code(s): 19224882


   





(2) Non-ST elevated myocardial infarction


Current Visit: Yes   Status: Acute   Code(s): I21.4 - NON-ST ELEVATION (NSTEMI) 

MYOCARDIAL INFARCTION   SNOMED Code(s): 842741751


   





(3) COPD (chronic obstructive pulmonary disease)


Current Visit: Yes   Status: Acute   Code(s): J44.9 - CHRONIC OBSTRUCTIVE 

PULMONARY DISEASE, UNSPECIFIED   SNOMED Code(s): 12021767


   





(4) Lumbar disc disease


Current Visit: Yes   Status: Acute   Code(s): M51.9 - UNSP THORACIC, THORACOLUM 

AND LUMBOSACR INTVRT DISC DISORDER   SNOMED Code(s): 029906345


   





(5) Peripheral vascular disease


Current Visit: Yes   Status: Acute   Code(s): I73.9 - PERIPHERAL VASCULAR 

DISEASE, UNSPECIFIED   SNOMED Code(s): 726909929


   





(6) Diabetes mellitus type 2 in nonobese


Current Visit: Yes   Status: Acute   Code(s): E11.9 - TYPE 2 DIABETES MELLITUS 

WITHOUT COMPLICATIONS   SNOMED Code(s): 665985886


   





(7) History of smoking


Current Visit: Yes   Status: Acute   Code(s): Z87.891 - PERSONAL HISTORY OF 

NICOTINE DEPENDENCE   SNOMED Code(s): 00007235707369973


   





(8) Chronic diastolic CHF (congestive heart failure)


Current Visit: Yes   Status: Acute   Code(s): I50.32 - CHRONIC DIASTOLIC (

CONGESTIVE) HEART FAILURE   SNOMED Code(s): 656123186


   





(9) Chronic pain syndrome


Current Visit: Yes   Status: Acute   Code(s): G89.4 - CHRONIC PAIN SYNDROME   

SNOMED Code(s): 685806863


   





(10) History of MRSA infection


Current Visit: Yes   Status: Acute   Code(s): Z86.14 - PERSONAL HISTORY OF 

METHICILLIN RESIS STAPH INFECTION   SNOMED Code(s): 081181596


   





(11) Chronic atrial fibrillation


Current Visit: Yes   Status: Acute   Code(s): I48.2 - CHRONIC ATRIAL 

FIBRILLATION   SNOMED Code(s): 982229039


   





(12) GERD (gastroesophageal reflux disease)


Current Visit: Yes   Status: Acute   Code(s): K21.9 - GASTRO-ESOPHAGEAL REFLUX 

DISEASE WITHOUT ESOPHAGITIS   SNOMED Code(s): 458738979


   





(13) BPH (benign prostatic hyperplasia)


Current Visit: Yes   Status: Acute   Code(s): N40.0 - BENIGN PROSTATIC 

HYPERPLASIA WITHOUT LOWER URINRY TRACT SYMP   SNOMED Code(s): 675554136


   





(14) History of sick sinus syndrome


Current Visit: Yes   Status: Acute   Code(s): Z86.79 - PERSONAL HISTORY OF 

OTHER DISEASES OF THE CIRCULATORY SYSTEM   SNOMED Code(s): 072423858868339


   





(15) History of permanent cardiac pacemaker placement


Current Visit: Yes   Status: Acute   Code(s): Z95.0 - PRESENCE OF CARDIAC 

PACEMAKER   SNOMED Code(s): 181222301


   





(16) Family history of coronary artery disease in brother


Current Visit: Yes   Status: Acute   Code(s): Z82.49 - FAMILY HX OF ISCHEM 

HEART DIS AND OTH DIS OF THE CIRC SYS   SNOMED Code(s): 489034684


   





(17) History of Coumadin therapy


Current Visit: Yes   Status: Acute   Code(s): Z92.29 - PERSONAL HISTORY OF 

OTHER DRUG THERAPY   SNOMED Code(s): 543771032


   


Plan: 





1.  Continue aspirin, statin, Plavix, and heparin subcu.  We will restart his 

metoprolol tartrate 12.5 mg by mouth twice a day once his pacemaker has been 

interrogated and set to a baseline of 70 bpm.


2.  Encourage use of his incentive spirometry every hour while awake.


3.  Continue to hold Coumadin.


4.  We will discontinue his [place]-Pam catheter.  Keep his right IJ Cordis in 

place to continuous CVP monitoring.


5.  Pulmonary recommendations per Dr. Denson.


6.  We will restart his home dose of Flomax and Neurontin.


7.  Hit assay results pending.


8.  Increase activity as tolerated, PT/OT/cardiac rehab consulted.


9.  Pain control with current when necessary medications.


10.  Will monitor daily labs and chest x-rays.


11.  More recommendations to follow based on the patient's clinical course.


Time with Patient: Greater than 30

## 2018-06-12 NOTE — XR
EXAMINATION TYPE: XR chest 1V portable

 

DATE OF EXAM: 6/12/2018

 

COMPARISON: Prior chest 6/11/2018

 

HISTORY: Postop cardiac surgery

 

TECHNIQUE: Single frontal view of the chest is obtained.

 

FINDINGS:  There is been interval removal of the endotracheal and NG tube. Right jugular central veno
us sheath and coaxial [place]-Pam catheter present, distal tip the catheter present over the pulmonary 
artery. Patient is post median sternotomy. Left chest tube remains in place. Atrial appendage clippin
g is noted. Left-sided pacemaker with leads in the right atrium and ventricle again noted. There are 
overlying cardiac leads. No sizable pneumothorax or pleural effusion. Bibasilar increased attenuation
 persists. Heart size is stable.

 

IMPRESSION:  Basilar atelectasis versus edema, correlate to exclude pneumonia. Interval extubation.

## 2018-06-12 NOTE — P.PN
Progress Note - Text





The patient is a 78-year-old gentleman who yesterday underwent a quadruple 

coronary artery bypass grafting along with exclusion of the left atrial 

appendage for underlying triple-vessel coronary artery disease and recent non-

ST elevated myocardial infarction.  The patient is presently extubated and 

sitting up at the side of the bed.  He is fatigued but responsive and 

appropriate.





Vital signs this morning reveal a pulse of 92 with respirations 12 and blood 

pressure 113/45.  Is 98% saturated on 40% Ventimask.


Lungs are generally clear although diminished.  Heart tones are somewhat 

irregular with history of atrial fibrillation.


He does not appear to have any new neurological changes.





Laboratory


White count is 11.2 with a hemoglobin of 9.0 and a platelet count of 84.


INR is 1.4.


Electrolytes are good although potassium slightly elevated at 5.3.  BUN of 33 

with a creatinine of 0.8 GFR is 86.


Blood sugar was 128.  Albumin 3.2.





Impressions and plans


Overall this 78-year-old gentleman is generally doing well post bypass surgery 

as stated above.  Continue to progress as per surgery and cardiology.  I 

discussed with patient and staff at bedside this morning.

## 2018-06-12 NOTE — PN
PROGRESS NOTE



This is a 70-year-old male patient who underwent coronary artery bypass grafting

yesterday.  He has been extubated.  He is sitting up in a chair.  He denies any

shortness of breath.  He has minimal incisional pain.  He looks quite comfortable.



PHYSICAL EXAMINATION:

His pulse rate is in the 80s to 90s, blood pressure 107/38 mmHg, respirations 14-16.

Breath sounds are reduced bilaterally, conduction sounds are audible.  Heart sounds are

soft.  Extremities are warm.  There is no edema.



IMPRESSION:

1. Multivessel coronary artery disease with left main disease.

2. Severe chronic obstructive pulmonary disease with FEV1 of 45%, on home oxygen.

3. Diastolic dysfunction, preserved left ventricular  systolic function.

4. Hypertension.

5. Status post coronary artery bypass grafting.

6. History of permanent atrial fibrillation.

7. History of sick sinus syndrome, status post permanent pacemaker implantation.

    Suggest continue ICU care and continue current medications and follow up.





MMODL / IJN: [id number] / Job#: [id number]

## 2018-06-13 LAB
ALBUMIN SERPL-MCNC: 3.2 G/DL (ref 3.5–5)
ALP SERPL-CCNC: 46 U/L (ref 38–126)
ALT SERPL-CCNC: 28 U/L (ref 21–72)
ANION GAP SERPL CALC-SCNC: 8 MMOL/L
AST SERPL-CCNC: 38 U/L (ref 17–59)
BASOPHILS # BLD AUTO: 0 K/UL (ref 0–0.2)
BASOPHILS NFR BLD AUTO: 0 %
BUN SERPL-SCNC: 34 MG/DL (ref 9–20)
CALCIUM SPEC-MCNC: 8.4 MG/DL (ref 8.4–10.2)
CHLORIDE SERPL-SCNC: 100 MMOL/L (ref 98–107)
CO2 BLDA-SCNC: 30 MMOL/L (ref 19–24)
CO2 SERPL-SCNC: 28 MMOL/L (ref 22–30)
EOSINOPHIL # BLD AUTO: 0 K/UL (ref 0–0.7)
EOSINOPHIL NFR BLD AUTO: 0 %
ERYTHROCYTE [DISTWIDTH] IN BLOOD BY AUTOMATED COUNT: 2.95 M/UL (ref 4.3–5.9)
ERYTHROCYTE [DISTWIDTH] IN BLOOD: 15.6 % (ref 11.5–15.5)
GLUCOSE BLD-MCNC: 153 MG/DL (ref 75–99)
GLUCOSE BLD-MCNC: 161 MG/DL (ref 75–99)
GLUCOSE BLD-MCNC: 161 MG/DL (ref 75–99)
GLUCOSE BLD-MCNC: 167 MG/DL (ref 75–99)
GLUCOSE BLD-MCNC: 175 MG/DL (ref 75–99)
GLUCOSE BLD-MCNC: 181 MG/DL (ref 75–99)
GLUCOSE BLD-MCNC: 199 MG/DL (ref 75–99)
GLUCOSE SERPL-MCNC: 156 MG/DL (ref 74–99)
HCO3 BLDA-SCNC: 29 MMOL/L (ref 21–25)
HCT VFR BLD AUTO: 26.1 % (ref 39–53)
HGB BLD-MCNC: 8.6 GM/DL (ref 13–17.5)
LYMPHOCYTES # SPEC AUTO: 1.2 K/UL (ref 1–4.8)
LYMPHOCYTES NFR SPEC AUTO: 11 %
MAGNESIUM SPEC-SCNC: 2.3 MG/DL (ref 1.6–2.3)
MCH RBC QN AUTO: 29.2 PG (ref 25–35)
MCHC RBC AUTO-ENTMCNC: 33 G/DL (ref 31–37)
MCV RBC AUTO: 88.4 FL (ref 80–100)
MONOCYTES # BLD AUTO: 1 K/UL (ref 0–1)
MONOCYTES NFR BLD AUTO: 9 %
NEUTROPHILS # BLD AUTO: 8.1 K/UL (ref 1.3–7.7)
NEUTROPHILS NFR BLD AUTO: 78 %
PCO2 BLDA: 35 MMHG (ref 35–45)
PH BLDA: 7.52 [PH] (ref 7.35–7.45)
PLATELET # BLD AUTO: 66 K/UL (ref 150–450)
PO2 BLDA: 57 MMHG (ref 83–108)
POTASSIUM SERPL-SCNC: 4.3 MMOL/L (ref 3.5–5.1)
PROT SERPL-MCNC: 5 G/DL (ref 6.3–8.2)
SODIUM SERPL-SCNC: 136 MMOL/L (ref 137–145)
WBC # BLD AUTO: 10.4 K/UL (ref 3.8–10.6)

## 2018-06-13 RX ADMIN — OXYCODONE HYDROCHLORIDE AND ACETAMINOPHEN SCH MG: 500 TABLET ORAL at 14:10

## 2018-06-13 RX ADMIN — IPRATROPIUM BROMIDE AND ALBUTEROL SULFATE SCH ML: .5; 3 SOLUTION RESPIRATORY (INHALATION) at 16:07

## 2018-06-13 RX ADMIN — HEPARIN SODIUM SCH UNIT: 5000 INJECTION, SOLUTION INTRAVENOUS; SUBCUTANEOUS at 00:45

## 2018-06-13 RX ADMIN — IPRATROPIUM BROMIDE AND ALBUTEROL SULFATE SCH ML: .5; 3 SOLUTION RESPIRATORY (INHALATION) at 12:02

## 2018-06-13 RX ADMIN — DOCUSATE SODIUM AND SENNOSIDES SCH EACH: 50; 8.6 TABLET ORAL at 21:09

## 2018-06-13 RX ADMIN — METHYLPREDNISOLONE SODIUM SUCCINATE SCH MG: 40 INJECTION, POWDER, FOR SOLUTION INTRAMUSCULAR; INTRAVENOUS at 18:27

## 2018-06-13 RX ADMIN — ACETAMINOPHEN SCH MLS/HR: 10 INJECTION, SOLUTION INTRAVENOUS at 18:27

## 2018-06-13 RX ADMIN — SODIUM CHLORIDE, PRESERVATIVE FREE SCH: 5 INJECTION INTRAVENOUS at 09:19

## 2018-06-13 RX ADMIN — SODIUM CHLORIDE, PRESERVATIVE FREE SCH: 5 INJECTION INTRAVENOUS at 21:10

## 2018-06-13 RX ADMIN — METHYLPREDNISOLONE SODIUM SUCCINATE SCH MG: 40 INJECTION, POWDER, FOR SOLUTION INTRAMUSCULAR; INTRAVENOUS at 23:54

## 2018-06-13 RX ADMIN — MUPIROCIN SCH APPLIC: 20 OINTMENT TOPICAL at 08:56

## 2018-06-13 RX ADMIN — INSULIN ASPART SCH UNIT: 100 INJECTION, SOLUTION INTRAVENOUS; SUBCUTANEOUS at 14:09

## 2018-06-13 RX ADMIN — GABAPENTIN SCH MG: 100 CAPSULE ORAL at 18:26

## 2018-06-13 RX ADMIN — Medication SCH MG: at 14:09

## 2018-06-13 RX ADMIN — TAMSULOSIN HYDROCHLORIDE SCH MG: 0.4 CAPSULE ORAL at 08:43

## 2018-06-13 RX ADMIN — ATORVASTATIN CALCIUM SCH MG: 40 TABLET, FILM COATED ORAL at 08:43

## 2018-06-13 RX ADMIN — ACETAMINOPHEN SCH MLS/HR: 10 INJECTION, SOLUTION INTRAVENOUS at 23:54

## 2018-06-13 RX ADMIN — HYDROCODONE BITARTRATE AND ACETAMINOPHEN PRN EACH: 5; 325 TABLET ORAL at 10:33

## 2018-06-13 RX ADMIN — FONDAPARINUX SODIUM SCH MG: 2.5 INJECTION, SOLUTION SUBCUTANEOUS at 08:17

## 2018-06-13 RX ADMIN — BUDESONIDE SCH MG: 1 SUSPENSION RESPIRATORY (INHALATION) at 19:49

## 2018-06-13 RX ADMIN — INSULIN ASPART SCH UNIT: 100 INJECTION, SOLUTION INTRAVENOUS; SUBCUTANEOUS at 18:27

## 2018-06-13 RX ADMIN — METHYLPREDNISOLONE SODIUM SUCCINATE SCH MG: 40 INJECTION, POWDER, FOR SOLUTION INTRAMUSCULAR; INTRAVENOUS at 09:16

## 2018-06-13 RX ADMIN — ASPIRIN 325 MG ORAL TABLET SCH MG: 325 PILL ORAL at 08:43

## 2018-06-13 RX ADMIN — HYDROCODONE BITARTRATE AND ACETAMINOPHEN PRN EACH: 5; 325 TABLET ORAL at 02:37

## 2018-06-13 RX ADMIN — INSULIN ASPART SCH UNIT: 100 INJECTION, SOLUTION INTRAVENOUS; SUBCUTANEOUS at 21:09

## 2018-06-13 RX ADMIN — POTASSIUM CHLORIDE SCH: 14.9 INJECTION, SOLUTION INTRAVENOUS at 18:33

## 2018-06-13 RX ADMIN — GABAPENTIN SCH MG: 100 CAPSULE ORAL at 08:43

## 2018-06-13 RX ADMIN — BUDESONIDE AND FORMOTEROL FUMARATE DIHYDRATE SCH PUFF: 160; 4.5 AEROSOL RESPIRATORY (INHALATION) at 08:41

## 2018-06-13 RX ADMIN — IPRATROPIUM BROMIDE AND ALBUTEROL SULFATE PRN ML: .5; 3 SOLUTION RESPIRATORY (INHALATION) at 02:47

## 2018-06-13 RX ADMIN — GABAPENTIN SCH MG: 100 CAPSULE ORAL at 21:09

## 2018-06-13 RX ADMIN — INSULIN ASPART SCH UNIT: 100 INJECTION, SOLUTION INTRAVENOUS; SUBCUTANEOUS at 08:41

## 2018-06-13 RX ADMIN — PANTOPRAZOLE SODIUM SCH MG: 40 TABLET, DELAYED RELEASE ORAL at 08:43

## 2018-06-13 RX ADMIN — METOPROLOL SUCCINATE SCH MG: 50 TABLET, EXTENDED RELEASE ORAL at 09:16

## 2018-06-13 RX ADMIN — IPRATROPIUM BROMIDE AND ALBUTEROL SULFATE SCH ML: .5; 3 SOLUTION RESPIRATORY (INHALATION) at 19:49

## 2018-06-13 RX ADMIN — MUPIROCIN SCH APPLIC: 20 OINTMENT TOPICAL at 21:09

## 2018-06-13 RX ADMIN — IPRATROPIUM BROMIDE AND ALBUTEROL SULFATE SCH ML: .5; 3 SOLUTION RESPIRATORY (INHALATION) at 08:41

## 2018-06-13 RX ADMIN — CLOPIDOGREL BISULFATE SCH MG: 75 TABLET ORAL at 08:42

## 2018-06-13 NOTE — P.PN
Subjective


Progress Note Date: 06/13/18


Principal diagnosis: 


Coronary artery disease, status post coronary artery bypass grafting, postop 

day 1





Progress note dated 06/11/2018





78-year-old male with a history of non-ST segment elevation myocardial 

infarction, status post cardiac catheterization revealing severe triple-vessel 

CAD.  In addition, he is quite severe COPD, with an FEV1 in the range of 45% of 

predicted.  He has multiple additional comorbidities including chronic atrial 

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 

systolic heart failure.  The patient surgery is to be done today.  The patient 

is on O2 3 L nasal cannula and has a dextrose half-normal saline IV at KVO.  He 

was on a heparin drip but obviously that was turned off prior to surgery.  

Other than that, the patient is doing reasonably well.  He certainly is at high 

risk.  This has been documented by my partner.





On 06/12/2018 patient seen in follow-up in the intensive care unit.  This is 

postop day 1, status post quadruple coronary artery bypass grafting using the 

LIMA to LAD, reverse SVG to OM1, reverse SVG to OM 2, reverse SVG to the RCA, 

exclusion of the left atrial appendage, and intraoperative SKIP and AP aortic 

scanning.  Patient was extubated in less than 6 hours post OR exit time.  

Patient was extubated at 9:00 last night successfully to a 40% Ventimask, on 

which she remains this morning.  He is awake alert, sitting up in the recliner, 

doing well, denies any acute distress.  Midsternal incision is stable, clean 

dry and intact


One mediastinal and one left pleural chest tube draining small amount of 

serosanguineous drainage.  Maintenance IV includes LR at a rate of 50 ML per 

hour, insulin drip at 2 units per hour, no vasoactive drips at this time, 

clever proximal has been discontinued, as well as nitroglycerin drip.  Toth 

catheter is in place, and the urine output is ranging from 30-75 ML per hour.  

Cardiac index and output is 5.5 and 3.0 respectively.  Dissipate 

discontinuation of the PA catheter this morning.  PA pressures 28/11, with a 

map of 19.  Patient has not required any blood transfusions.  Today's labs were 

reviewed, WBCs 11.2, hemoglobin is 9.0, INR is 1.4, potassium is 5.3, BUN is 33

, creatinine 0.80.  Today's chest x-ray has been reviewed and shows basilar 

atelectasis versus edema.  Lung sounds positive for some crackles over left 

lower lobe anteriorly, diminished on the right.  Patient Solu-Medrol's has been 

switched to oral prednisone. 





On 06/13/2018 patient seen in follow-up in intensive care unit.  This postop 

day 2, status post coronary artery bypass grafting 4, and exclusion of the 

left atrial appendage.  He is awake and alert, remains on 40% Ventimask, today'

s exam patient is a bit more congested, there is scattered rhonchi, and wheezes 

bilaterally.  His chest x-ray has been reviewed and shows new right basilar 

airspace disease that could represent atelectasis or developing pneumonia, 

stable left basilar subsegmental atelectasis, and trace right pleural effusion.

  Incentive spirometry effort is 800 mL today.  He is still chest tube has been 

removed, left pleural chest tube remains in place with serosanguineous output.  

He remains in A. fib with frequent PVCs.  He was started on Arixtra for 

anticoagulation, he was given a dose of IV digoxin for rate control.  Denies 

any chest pain or dyspnea.  Sternal incision is clean dry and intact, stable.  

[place]-Pam catheter has been removed.  Maintenance in the IV include LR at a 

rate of 30 ML per hour, no other drips.  Which the oral prednisone back to Solu-

Medrol 40 mg every 8 hours, we'll discontinue Symbicort and put the patient on 

Pulmicort. 





Objective





- Vital Signs


Vital signs: 


 Vital Signs











Temp  98.2 F   06/13/18 04:00


 


Pulse  125 H  06/13/18 08:53


 


Resp  17   06/13/18 07:00


 


BP  123/56   06/11/18 06:00


 


Pulse Ox  97   06/13/18 08:42








 Intake & Output











 06/12/18 06/13/18 06/13/18





 18:59 06:59 18:59


 


Intake Total 2261.942 814.89 36


 


Output Total 1250 975 70


 


Balance 1011.942 -160.11 -34


 


Weight 80.2 kg 81.1 kg 


 


Intake:   


 


   542 36


 


    ACETAMINOPHEN IV (For  100 





    ) 1,000 mg In Empty Bag 1   





    bag @ 400 mls/hr IVPB   





    Q6HR [facility]ON[facility] Rx#:[id number]   


 


    Cardiac Index/Output 30  


 


    Lactated Ringers 1,000 ml 550 370 30





    @ 20 mls/hr IV .Q24H [facility]   





    Rx#:[id number]   


 


    Pressure bag 81 72 6


 


    ceFAZolin 2 gm In Sodium 30  





    Chloride 0.9% 30 ml @ 60   





    mls/hr IVPB ONCE ONE Rx#:   





    [id number]   


 


  Intake, IV Titration 10.942 22.89 





  Amount   


 


    Insulin Regular 100 unit 10.942 22.89 





    In Sodium Chloride 0.9%   





    100 ml @ Per Protocol IV   





    .Q0M [facility] Rx#:[id number]   


 


  Oral 1360 250 


 


Output:   


 


  Chest Tube Drainage 140 350 40


 


    Left Pleural Chest Tube 60 130 20


 


    Mediastinal Chest Tube 80 220 20


 


  Drainage 15 35 


 


    Right Calf 15 35 


 


  Urine 1095 590 30


 


Other:   


 


  Voiding Method Indwelling Catheter Indwelling Catheter 








 ABP, PAP, CO, CI - Last Documented











Arterial Blood Pressure        120/52


 


Pulmonary Artery Pressure      25/8


 


Cardiac Output                 5.5


 


Cardiac Index                  3.0

















- Exam


GENERAL EXAM: Alert, pleasant, 70-year-old white male comfortable in no 

apparent distress, on 40% Ventimask


HEAD: Normocephalic/atraumatic.


EYES: Normal reaction of pupils, equal size.  Conjunctiva pink, sclera white.


NOSE: Clear with pink turbinates.


THROAT: No erythema or exudates.


NECK: No masses, no JVD, no thyroid enlargement, no adenopathy.  Right IJ 

Cordis with a PA catheter is present


CHEST: No chest wall deformity.  Symmetrical expansion.  Midsternal incision is 

clean dry and intact and stable.  Left pleural chest tubes are present 

connected to Pleur-evac's and to wall suction, with serosanguineous output and 

the collection chambers no air leak noted.  Mediastinal chest tube has been 

discontinued this morning.


LUNGS: Equal air entry bilateral rhonchi and scattered wheezes


CVS: Regular rate and rhythm, normal S1 and S2, no gallops, no murmurs, no rubs


ABDOMEN: Soft, nontender.  No hepatosplenomegaly, normal bowel sounds, no 

guarding or rigidity.


EXTREMITIES: No clubbing, no edema, no cyanosis, 2+ pulses and upper and lower 

extremities.  There is a JOCE drain in the right lower leg, right lower leg is 

Ace wrapped


MUSCULOSKELETAL: Muscle strength and tone normal.


SPINE: No scoliosis or deformity


SKIN: No rashes


CENTRAL NERVOUS SYSTEM: Alert and oriented -3.  No focal deficits, tone is 

normal in all 4 extremities.


PSYCHIATRIC: Alert and oriented -3.  Appropriate affect.  Intact judgment and 

insight.











- Labs


CBC & Chem 7: 


 06/13/18 04:41





 06/13/18 04:41


Labs: 


 Abnormal Lab Results - Last 24 Hours (Table)











  06/11/18 06/12/18 06/12/18 Range/Units





  04:09 10:13 11:07 


 


RBC     (4.30-5.90)  m/uL


 


Hgb     (13.0-17.5)  gm/dL


 


Hct     (39.0-53.0)  %


 


RDW     (11.5-15.5)  %


 


Plt Count     (150-450)  k/uL


 


Neutrophils #     (1.3-7.7)  k/uL


 


Sodium     (137-145)  mmol/L


 


BUN     (9-20)  mg/dL


 


Glucose     (74-99)  mg/dL


 


POC Glucose (mg/dL)   151 H  157 H  (75-99)  mg/dL


 


Total Protein     (6.3-8.2)  g/dL


 


Albumin     (3.5-5.0)  g/dL


 


Heparin-Ind Plt Ab Scrn  0.656 H    (<0.4)  OD














  06/12/18 06/12/18 06/12/18 Range/Units





  11:52 14:07 16:09 


 


RBC     (4.30-5.90)  m/uL


 


Hgb     (13.0-17.5)  gm/dL


 


Hct     (39.0-53.0)  %


 


RDW     (11.5-15.5)  %


 


Plt Count     (150-450)  k/uL


 


Neutrophils #     (1.3-7.7)  k/uL


 


Sodium     (137-145)  mmol/L


 


BUN     (9-20)  mg/dL


 


Glucose     (74-99)  mg/dL


 


POC Glucose (mg/dL)  137 H  157 H  142 H  (75-99)  mg/dL


 


Total Protein     (6.3-8.2)  g/dL


 


Albumin     (3.5-5.0)  g/dL


 


Heparin-Ind Plt Ab Scrn     (<0.4)  OD














  06/12/18 06/13/18 06/13/18 Range/Units





  20:06 02:34 04:41 


 


RBC     (4.30-5.90)  m/uL


 


Hgb     (13.0-17.5)  gm/dL


 


Hct     (39.0-53.0)  %


 


RDW     (11.5-15.5)  %


 


Plt Count     (150-450)  k/uL


 


Neutrophils #     (1.3-7.7)  k/uL


 


Sodium    136 L  (137-145)  mmol/L


 


BUN    34 H  (9-20)  mg/dL


 


Glucose    156 H  (74-99)  mg/dL


 


POC Glucose (mg/dL)  142 H  161 H   (75-99)  mg/dL


 


Total Protein    5.0 L  (6.3-8.2)  g/dL


 


Albumin    3.2 L  (3.5-5.0)  g/dL


 


Heparin-Ind Plt Ab Scrn     (<0.4)  OD














  06/13/18 06/13/18 06/13/18 Range/Units





  04:41 07:24 07:37 


 


RBC  2.95 L    (4.30-5.90)  m/uL


 


Hgb  8.6 L    (13.0-17.5)  gm/dL


 


Hct  26.1 L    (39.0-53.0)  %


 


RDW  15.6 H    (11.5-15.5)  %


 


Plt Count  66 L    (150-450)  k/uL


 


Neutrophils #  8.1 H    (1.3-7.7)  k/uL


 


Sodium     (137-145)  mmol/L


 


BUN     (9-20)  mg/dL


 


Glucose     (74-99)  mg/dL


 


POC Glucose (mg/dL)   161 H  167 H  (75-99)  mg/dL


 


Total Protein     (6.3-8.2)  g/dL


 


Albumin     (3.5-5.0)  g/dL


 


Heparin-Ind Plt Ab Scrn     (<0.4)  OD








 Microbiology - Last 24 Hours (Table)











 06/11/18 04:56 Gram Stain - Preliminary





 Sputum Sputum Culture - Preliminary





    Gram Neg Bacilli





    Presumptive Staph aureus














Assessment and Plan


Plan: 


Assessment:





Acute non-ST segment elevation myocardial infarction, secondary to severe triple

-vessel CAD, status post quadruple coronary artery bypass grafting with LIMA to 

LAD, reverse SVG to OM1, reverse SVG to OM 2, and reverse SVG to RCA, and 

exclusion of the left atrial appendage with AtriCure clip, postop day 2





Severe COPD, stage III disease, with an FEV1 that is 45% of predicted





Chronic hypoxemic respiratory failure





Chronic atrial fibrillation





Type 2 diabetes mellitus





Diabetic neuropathy





PVOD





Osteomyelitis





History of hypertension





History of systolic heart failure.








Plan: 





We will discontinue the Symbicort, and put the patient on Pulmicort 1 mg twice 

daily, continue with DuoNeb, we will switch to oral prednisone back to Solu-

Medrol 40 mg every 8 hours, continue encouraging incentive spirometry, continue 

encouraging ambulation, deep breathing and coughing.  Continue monitoring chest 

tube output, vital signs, labs. 





I performed a history & physical examination of the patient and discussed their 

management with my nurse practitioner, Keeley Self.  I reviewed the nurse 

practitioner's note and agree with the documented findings and plan of care.  

Lung sounds are positive for scattered rhonchi and wheezes.  The findings and 

the impression was discussed with the patient.  I attest to the documentation 

by the nurse practitioner. 


Time with Patient: Less than 30

## 2018-06-13 NOTE — P.PN
Subjective


Progress Note Date: 06/13/18


Principal diagnosis: 





Symptomatic multivessel coronary artery disease with left main disease, 

hypertension, severe COPD stage III with an FEV1 of 45% predicted and home 

oxygen use, chronic diastolic congestive heart failure, osteoarthritis, lumbar 

disc disease with chronic pain syndrome, history of deep vein thrombosis of his 

left leg, recurrent feet and hand ulcerations with osteomyelitis and history of 

MRSA requiring amputations, type 2 diabetes mellitus, peripheral vascular 

disease, remote history of pneumonia, non-ST elevated myocardial infarction 

this admission, family history of early onset coronary artery disease with his 

brother being diagnosed with a myocardial infarction at age 46, history of sick 

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 

hypertrophy, history of chronic persistent atrial fibrillation on home Coumadin 

therapy, remote history of smoking quit over 30 years ago, GERD, and a 

preoperative nasal swab positive for MSSA.





POD #2 quadruple coronary artery bypass grafting using the left internal 

mammary artery to the left anterior descending coronary artery, a reverse 

greater saphenous vein graft from the aorta to the first obtuse marginal 

coronary artery, a reverse greater saphenous vein graft from the aorta to the 

second obtuse marginal coronary artery, a reverse greater saphenous vein graft 

from the aorta to the right coronary artery.  Exclusion of the left atrial 

appendage using a 40 mm AtriClip.  Endoscopic harvesting of the right greater 

saphenous vein, intraoperative transesophageal echocardiogram and epi-aortic 

scanning.  Intraoperative graft flow measurements using the Hyperoptic system.





Postoperative normocytic, normochromic anemia an expected outcome of surgery 

related to hemodilution.





Patient is sitting up in bed with his head elevated.  He is in no acute 

distress. He is complaining of some shortness of breath and is complaining of 

pain to his chest tube insertion sites, rating his pain 5 out of 10 on the pain 

scale.  His bedside telemetry is showing atrial fibrillation with RVR heart 

rate 130.  He is achieving 1000 L on his incentive spirometry.





Objective





- Vital Signs


Vital signs: 


 Vital Signs











Temp  98.2 F   06/13/18 04:00


 


Pulse  119 H  06/13/18 07:00


 


Resp  17   06/13/18 07:00


 


BP  123/56   06/11/18 06:00


 


Pulse Ox  99   06/13/18 07:00








 Intake & Output











 06/12/18 06/13/18 06/13/18





 18:59 06:59 18:59


 


Intake Total 2261.942 814.89 36


 


Output Total 1250 975 70


 


Balance 1011.942 -160.11 -34


 


Weight 80.2 kg 81.1 kg 


 


Intake:   


 


   542 36


 


    ACETAMINOPHEN IV (For  100 





    ) 1,000 mg In Empty Bag 1   





    bag @ 400 mls/hr IVPB   





    Q6HR [facility] Rx#:[id number]   


 


    Cardiac Index/Output 30  


 


    Lactated Ringers 1,000 ml 550 370 30





    @ 20 mls/hr IV .Q24H [facility]   





    Rx#:[id number]   


 


    Pressure bag 81 72 6


 


    ceFAZolin 2 gm In Sodium 30  





    Chloride 0.9% 30 ml @ 60   





    mls/hr IVPB ONCE ONE Rx#:   





    [id number]   


 


  Intake, IV Titration 10.942 22.89 





  Amount   


 


    Insulin Regular 100 unit 10.942 22.89 





    In Sodium Chloride 0.9%   





    100 ml @ Per Protocol IV   





    .Q0M [facility] Rx#:[id number]   


 


  Oral 1360 250 


 


Output:   


 


  Chest Tube Drainage 140 350 40


 


    Left Pleural Chest Tube 60 130 20


 


    Mediastinal Chest Tube 80 220 20


 


  Drainage 15 35 


 


    Right Calf 15 35 


 


  Urine 1095 590 30


 


Other:   


 


  Voiding Method Indwelling Catheter Indwelling Catheter 








 ABP, PAP, CO, CI - Last Documented











Arterial Blood Pressure        120/52


 


Pulmonary Artery Pressure      25/8


 


Cardiac Output                 5.5


 


Cardiac Index                  3.0

















- Constitutional


General appearance: Present: cooperative, no acute distress





- Neck


Details: 





Right IJ Cordis in place and patent.  Current CVP pressure is 9 mmHg.





- Respiratory


Details: 





Lung sounds are essentially clear throughout, diminished to his bilateral 

bases.  Respirations are symmetrical and nonlabored.  Occasional loose 

nonproductive cough.  Oxygen saturations are 99% on 40% Ventimask.  He is 

achieving 1000 mL on his incentive spirometry.  Mediastinal and left pleural 

chest tubes intact and are to continuous low wall suction -20 cm H2O.  No air 

leak present.  They're draining thin serosanguineous drainage.  Mediastinal 

chest tube drained 130 mL output in the last 8 hours, 420 mL output in the last 

24 hours.  Left pleural chest tube drained 90 mL in the last 8 hours, 240 mL 

output in the last 24 hours.  Mediastinal chest tube was discontinued without 

incident at 8 AM today.  Suture secured in place, folded 4 x 4 gauze to cover 

and secured with tape.





- Cardiovascular


Details: 





Irregular rhythm with a tachycardic rate.  S1 and S2 present, negative for S3, 

gallop or murmur.  Sternum is stable.  Bedside telemetry showing atrial 

fibrillation with RVR heart rate 130.  No edema present.  Heart hugger is in 

place.  Knee-high ROCIO hose and sequential compression devices in place to his 

bilateral lower extremities.  Ventricular epicardial pacemaker wire intact and 

grounded.  Permanent pacemaker left anterior chest was interrogated yesterday 

and said to a base line rate of 70.





- Gastrointestinal


Gastrointestinal Comment(s): 





Abdomen is soft, nontender and nondistended.  Hypoactive bowel sounds all 4 

abdominal quadrants.  Tolerating oral intake.  Passing flatus.  No organomegaly.





- Genitourinary


Genitourinary Comment(s): 





Toth catheter for accurate I&O.  Draining clear yellow urine.  425 mL output 

in the last 8 hours.





- Integumentary


Integumentary Comment(s): 





Skin is warm and dry.  No clubbing or cyanosis present.  Midline sternal 

incision clean and dry and approximated.  No redness or drainage present.  

Right leg EVH site clean dry and approximated.  No redness or drainage present.

  No rash or abnormal pigmentation present.





- Neurologic


Neurologic: Present: CNII-XII intact





- Musculoskeletal


Musculoskeletal: Present: generalized weakness, strength equal bilaterally





- Psychiatric


Psychiatric: Present: A&O x's 3, appropriate affect, intact judgment & insight





- Allied health notes


Allied health notes reviewed: nursing





- Labs


CBC & Chem 7: 


 06/13/18 04:41





 06/13/18 04:41


Labs: 


 Abnormal Lab Results - Last 24 Hours (Table)











  06/11/18 06/12/18 06/12/18 Range/Units





  04:09 08:56 10:13 


 


RBC     (4.30-5.90)  m/uL


 


Hgb     (13.0-17.5)  gm/dL


 


Hct     (39.0-53.0)  %


 


RDW     (11.5-15.5)  %


 


Plt Count     (150-450)  k/uL


 


Neutrophils #     (1.3-7.7)  k/uL


 


Sodium     (137-145)  mmol/L


 


BUN     (9-20)  mg/dL


 


Glucose     (74-99)  mg/dL


 


POC Glucose (mg/dL)   118 H  151 H  (75-99)  mg/dL


 


Total Protein     (6.3-8.2)  g/dL


 


Albumin     (3.5-5.0)  g/dL


 


Heparin-Ind Plt Ab Scrn  0.656 H    (<0.4)  OD














  06/12/18 06/12/18 06/12/18 Range/Units





  11:07 11:52 14:07 


 


RBC     (4.30-5.90)  m/uL


 


Hgb     (13.0-17.5)  gm/dL


 


Hct     (39.0-53.0)  %


 


RDW     (11.5-15.5)  %


 


Plt Count     (150-450)  k/uL


 


Neutrophils #     (1.3-7.7)  k/uL


 


Sodium     (137-145)  mmol/L


 


BUN     (9-20)  mg/dL


 


Glucose     (74-99)  mg/dL


 


POC Glucose (mg/dL)  157 H  137 H  157 H  (75-99)  mg/dL


 


Total Protein     (6.3-8.2)  g/dL


 


Albumin     (3.5-5.0)  g/dL


 


Heparin-Ind Plt Ab Scrn     (<0.4)  OD














  06/12/18 06/12/18 06/13/18 Range/Units





  16:09 20:06 02:34 


 


RBC     (4.30-5.90)  m/uL


 


Hgb     (13.0-17.5)  gm/dL


 


Hct     (39.0-53.0)  %


 


RDW     (11.5-15.5)  %


 


Plt Count     (150-450)  k/uL


 


Neutrophils #     (1.3-7.7)  k/uL


 


Sodium     (137-145)  mmol/L


 


BUN     (9-20)  mg/dL


 


Glucose     (74-99)  mg/dL


 


POC Glucose (mg/dL)  142 H  142 H  161 H  (75-99)  mg/dL


 


Total Protein     (6.3-8.2)  g/dL


 


Albumin     (3.5-5.0)  g/dL


 


Heparin-Ind Plt Ab Scrn     (<0.4)  OD














  06/13/18 06/13/18 06/13/18 Range/Units





  04:41 04:41 07:24 


 


RBC   2.95 L   (4.30-5.90)  m/uL


 


Hgb   8.6 L   (13.0-17.5)  gm/dL


 


Hct   26.1 L   (39.0-53.0)  %


 


RDW   15.6 H   (11.5-15.5)  %


 


Plt Count   66 L   (150-450)  k/uL


 


Neutrophils #   8.1 H   (1.3-7.7)  k/uL


 


Sodium  136 L    (137-145)  mmol/L


 


BUN  34 H    (9-20)  mg/dL


 


Glucose  156 H    (74-99)  mg/dL


 


POC Glucose (mg/dL)    161 H  (75-99)  mg/dL


 


Total Protein  5.0 L    (6.3-8.2)  g/dL


 


Albumin  3.2 L    (3.5-5.0)  g/dL


 


Heparin-Ind Plt Ab Scrn     (<0.4)  OD














  06/13/18 Range/Units





  07:37 


 


RBC   (4.30-5.90)  m/uL


 


Hgb   (13.0-17.5)  gm/dL


 


Hct   (39.0-53.0)  %


 


RDW   (11.5-15.5)  %


 


Plt Count   (150-450)  k/uL


 


Neutrophils #   (1.3-7.7)  k/uL


 


Sodium   (137-145)  mmol/L


 


BUN   (9-20)  mg/dL


 


Glucose   (74-99)  mg/dL


 


POC Glucose (mg/dL)  167 H  (75-99)  mg/dL


 


Total Protein   (6.3-8.2)  g/dL


 


Albumin   (3.5-5.0)  g/dL


 


Heparin-Ind Plt Ab Scrn   (<0.4)  OD








 Microbiology - Last 24 Hours (Table)











 06/11/18 04:56 Gram Stain - Preliminary





 Sputum Sputum Culture - Preliminary





    Gram Neg Bacilli





    Presumptive Staph aureus














- Imaging and Cardiology


Chest x-ray: report reviewed, image reviewed





Assessment and Plan


(1) Hypertension


Current Visit: Yes   Status: Acute   Code(s): I10 - ESSENTIAL (PRIMARY) 

HYPERTENSION   SNOMED Code(s): 00505263


   





(2) Non-ST elevated myocardial infarction


Current Visit: Yes   Status: Acute   Code(s): I21.4 - NON-ST ELEVATION (NSTEMI) 

MYOCARDIAL INFARCTION   SNOMED Code(s): 166989740


   





(3) COPD (chronic obstructive pulmonary disease)


Current Visit: Yes   Status: Acute   Code(s): J44.9 - CHRONIC OBSTRUCTIVE 

PULMONARY DISEASE, UNSPECIFIED   SNOMED Code(s): 33020032


   





(4) Lumbar disc disease


Current Visit: Yes   Status: Acute   Code(s): M51.9 - UNSP THORACIC, THORACOLUM 

AND LUMBOSACR INTVRT DISC DISORDER   SNOMED Code(s): 094716249


   





(5) Peripheral vascular disease


Current Visit: Yes   Status: Acute   Code(s): I73.9 - PERIPHERAL VASCULAR 

DISEASE, UNSPECIFIED   SNOMED Code(s): 049018057


   





(6) Diabetes mellitus type 2 in nonobese


Current Visit: Yes   Status: Acute   Code(s): E11.9 - TYPE 2 DIABETES MELLITUS 

WITHOUT COMPLICATIONS   SNOMED Code(s): 285606610


   





(7) History of smoking


Current Visit: Yes   Status: Acute   Code(s): Z87.891 - PERSONAL HISTORY OF 

NICOTINE DEPENDENCE   SNOMED Code(s): 06606802156757839


   





(8) Chronic diastolic CHF (congestive heart failure)


Current Visit: Yes   Status: Acute   Code(s): I50.32 - CHRONIC DIASTOLIC (

CONGESTIVE) HEART FAILURE   SNOMED Code(s): 426733946


   





(9) Chronic pain syndrome


Current Visit: Yes   Status: Acute   Code(s): G89.4 - CHRONIC PAIN SYNDROME   

SNOMED Code(s): 397633601


   





(10) History of MRSA infection


Current Visit: Yes   Status: Acute   Code(s): Z86.14 - PERSONAL HISTORY OF 

METHICILLIN RESIS STAPH INFECTION   SNOMED Code(s): 393141317


   





(11) Chronic atrial fibrillation


Current Visit: Yes   Status: Acute   Code(s): I48.2 - CHRONIC ATRIAL 

FIBRILLATION   SNOMED Code(s): 173928282


   





(12) GERD (gastroesophageal reflux disease)


Current Visit: Yes   Status: Acute   Code(s): K21.9 - GASTRO-ESOPHAGEAL REFLUX 

DISEASE WITHOUT ESOPHAGITIS   SNOMED Code(s): 103398453


   





(13) BPH (benign prostatic hyperplasia)


Current Visit: Yes   Status: Acute   Code(s): N40.0 - BENIGN PROSTATIC 

HYPERPLASIA WITHOUT LOWER URINRY TRACT SYMP   SNOMED Code(s): 472484478


   





(14) History of sick sinus syndrome


Current Visit: Yes   Status: Acute   Code(s): Z86.79 - PERSONAL HISTORY OF 

OTHER DISEASES OF THE CIRCULATORY SYSTEM   SNOMED Code(s): 484585002438254


   





(15) History of permanent cardiac pacemaker placement


Current Visit: Yes   Status: Acute   Code(s): Z95.0 - PRESENCE OF CARDIAC 

PACEMAKER   SNOMED Code(s): 614754927


   





(16) Family history of coronary artery disease in brother


Current Visit: Yes   Status: Acute   Code(s): Z82.49 - FAMILY HX OF ISCHEM 

HEART DIS AND OTH DIS OF THE CIRC SYS   SNOMED Code(s): 592402665


   





(17) History of Coumadin therapy


Current Visit: Yes   Status: Acute   Code(s): Z92.29 - PERSONAL HISTORY OF 

OTHER DRUG THERAPY   SNOMED Code(s): 089367771


   


Plan: 





1.  Continue aspirin, statin, and Plavix.  We will increase his metoprolol 

tartrate to 25 mg by mouth twice a day.


2.  Encourage use of his incentive spirometry every hour while awake.


3.  Continue to hold Coumadin.


4.  Keep his right IJ Cordis in place to continuous CVP monitoring.


5.  Pulmonary recommendations per Dr. Denson.


6.  We will give digoxin 0.5 mg IVP 1 now for his atrial fibrillation with RVR.


7.  Hit assay results 0.656, we will stop his heparin subcu and start Arixtra 

2.5 mg subcu daily.


8.  Increase activity as tolerated, PT/OT/cardiac rehab following.


9.  Pain control with current when necessary medications.


10.  Will monitor daily labs and chest x-rays.


11.  Lasix 20 mg IV 1 now.


12.  We will discontinue his mediastinal chest tube and keep his left pleural 

chest tube in place to low continuous wall suction -20 cm H2O.


13.  1 g calcium chloride IV piggyback 1 now.


14.  More recommendations to follow based on the patient's clinical course.


Time with Patient: Greater than 30

## 2018-06-13 NOTE — P.PN
Progress Note - Text





The patient is a 78-year-old gentleman who 2 days previous underwent quadruple 

coronary artery bypass grafting along with exclusion of the left atrial 

appendage for significant left main and overall triple vessel coronary disease 

and recent non-ST segment elevated myocardial infarction.  The patient 

presently is sitting up in bed.  Alert.  Ventimask in place.  Pain overall 

seems to be controlled.





Vital signs: Temperature 98.2 pulse varies from 90 up to 119.  Respiration is 

17 and blood pressure 120/52.


He is 99% saturated on 40% Ventimask.


He is alert and oriented.  Lungs are clear anteriorly.  Heart tones are 

slightly tachycardic and irregular.


Abdomen is nontender.


Compression appliance is intact.


Does not appear to have any focal neurological deficits.





Laboratory


White count 10.4 with a hemoglobin 8.6 and a platelet count of 66


Sodium 136 with potassium 4.3.  BUN is 34 with creatinine 0.9 giving him a GFR 

of 82.  Blood sugar 156.  Albumin 3.2.





Chest x-ray


Reported as a new right basilar airspace disease.





Impressions and plans


Surgical, pulmonary and cardiology notes regarded.


Some expected blood loss anemia.  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


Continue to progress as per thoracic surgery.

## 2018-06-13 NOTE — PN
PROGRESS NOTE



A 78-year-old male patient who underwent coronary artery bypass grafting the day before

yesterday. He was stable yesterday.  This morning he went into atrial fibrillation with

RVR.  He has an underlying permanent single-chamber permanent pacemaker.  He also has

severe lung disease with reduced FEV1.



He looks a bit pale and sweaty, but he denies any chest discomfort.  He is not visibly

short of breath, but he looks uncomfortable. Heart sounds S1, S2 soft.  Breath sounds

are reduced bilaterally _____ sounds. Extremities warm no edema.



The ECG shows atrial fibrillation with intermittent ventricular pacing.  The pacemaker

has been reprogrammed to 70 beats per minute by CT surgery team.



SUGGEST:

1. Hold off on digoxin, no more digoxin.  #.

2. Change metoprolol from metoprolol tartrate to metoprolol succinate 50 mg twice

    daily and given 1 dose of metoprolol succinate at this time and allow for 24 hours

    of therapy before making any changes. If in the interim, his heart rate goes up

    intravenous IV metoprolol or even low-dose IV Cardizem may be used temporarily to

    control the heart rate.  Ultimately, we should be able to control the heart rate

    with his beta blockers, which he says he has permanent pacemaker.  His blood

    pressure was in the 130s to 140 systolic, so he should be able to tolerate this

    medication. 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

    heparin has been stopped and Arixtra has been started.





MMWILLIAM / MARIA LN: [id number] / Job#: [id number]

## 2018-06-14 LAB
ALBUMIN SERPL-MCNC: 3.2 G/DL (ref 3.5–5)
ALP SERPL-CCNC: 45 U/L (ref 38–126)
ALT SERPL-CCNC: 39 U/L (ref 21–72)
ANION GAP SERPL CALC-SCNC: 10 MMOL/L
AST SERPL-CCNC: 111 U/L (ref 17–59)
BASOPHILS # BLD AUTO: 0 K/UL (ref 0–0.2)
BASOPHILS NFR BLD AUTO: 0 %
BUN SERPL-SCNC: 35 MG/DL (ref 9–20)
CALCIUM SPEC-MCNC: 8.6 MG/DL (ref 8.4–10.2)
CHLORIDE SERPL-SCNC: 97 MMOL/L (ref 98–107)
CO2 SERPL-SCNC: 28 MMOL/L (ref 22–30)
EOSINOPHIL # BLD AUTO: 0 K/UL (ref 0–0.7)
EOSINOPHIL NFR BLD AUTO: 0 %
ERYTHROCYTE [DISTWIDTH] IN BLOOD BY AUTOMATED COUNT: 2.97 M/UL (ref 4.3–5.9)
ERYTHROCYTE [DISTWIDTH] IN BLOOD: 15.2 % (ref 11.5–15.5)
GLUCOSE BLD-MCNC: 128 MG/DL (ref 75–99)
GLUCOSE BLD-MCNC: 156 MG/DL (ref 75–99)
GLUCOSE BLD-MCNC: 190 MG/DL (ref 75–99)
GLUCOSE BLD-MCNC: 191 MG/DL (ref 75–99)
GLUCOSE BLD-MCNC: 206 MG/DL (ref 75–99)
GLUCOSE SERPL-MCNC: 165 MG/DL (ref 74–99)
HCT VFR BLD AUTO: 26.3 % (ref 39–53)
HGB BLD-MCNC: 8.7 GM/DL (ref 13–17.5)
LYMPHOCYTES # SPEC AUTO: 0.7 K/UL (ref 1–4.8)
LYMPHOCYTES NFR SPEC AUTO: 7 %
MAGNESIUM SPEC-SCNC: 2.1 MG/DL (ref 1.6–2.3)
MCH RBC QN AUTO: 29.1 PG (ref 25–35)
MCHC RBC AUTO-ENTMCNC: 33 G/DL (ref 31–37)
MCV RBC AUTO: 88.3 FL (ref 80–100)
MONOCYTES # BLD AUTO: 0.5 K/UL (ref 0–1)
MONOCYTES NFR BLD AUTO: 5 %
NEUTROPHILS # BLD AUTO: 9 K/UL (ref 1.3–7.7)
NEUTROPHILS NFR BLD AUTO: 88 %
PLATELET # BLD AUTO: 84 K/UL (ref 150–450)
POTASSIUM SERPL-SCNC: 4.5 MMOL/L (ref 3.5–5.1)
PROT SERPL-MCNC: 5 G/DL (ref 6.3–8.2)
SODIUM SERPL-SCNC: 135 MMOL/L (ref 137–145)
WBC # BLD AUTO: 10.2 K/UL (ref 3.8–10.6)

## 2018-06-14 RX ADMIN — ACETAMINOPHEN SCH MLS/HR: 10 INJECTION, SOLUTION INTRAVENOUS at 05:58

## 2018-06-14 RX ADMIN — CIPROFLOXACIN SCH MG: 500 TABLET, FILM COATED ORAL at 20:27

## 2018-06-14 RX ADMIN — INSULIN ASPART SCH UNIT: 100 INJECTION, SOLUTION INTRAVENOUS; SUBCUTANEOUS at 08:02

## 2018-06-14 RX ADMIN — GABAPENTIN SCH MG: 100 CAPSULE ORAL at 21:29

## 2018-06-14 RX ADMIN — PANTOPRAZOLE SODIUM SCH MG: 40 TABLET, DELAYED RELEASE ORAL at 08:05

## 2018-06-14 RX ADMIN — MUPIROCIN SCH APPLIC: 20 OINTMENT TOPICAL at 08:08

## 2018-06-14 RX ADMIN — GABAPENTIN SCH MG: 100 CAPSULE ORAL at 08:07

## 2018-06-14 RX ADMIN — Medication SCH MG: at 20:27

## 2018-06-14 RX ADMIN — TAMSULOSIN HYDROCHLORIDE SCH MG: 0.4 CAPSULE ORAL at 08:05

## 2018-06-14 RX ADMIN — SODIUM CHLORIDE, PRESERVATIVE FREE SCH ML: 5 INJECTION INTRAVENOUS at 08:08

## 2018-06-14 RX ADMIN — BUDESONIDE SCH MG: 1 SUSPENSION RESPIRATORY (INHALATION) at 20:02

## 2018-06-14 RX ADMIN — POTASSIUM CHLORIDE SCH: 14.9 INJECTION, SOLUTION INTRAVENOUS at 16:33

## 2018-06-14 RX ADMIN — GABAPENTIN SCH MG: 100 CAPSULE ORAL at 16:33

## 2018-06-14 RX ADMIN — IPRATROPIUM BROMIDE AND ALBUTEROL SULFATE SCH ML: .5; 3 SOLUTION RESPIRATORY (INHALATION) at 20:02

## 2018-06-14 RX ADMIN — METHYLPREDNISOLONE SODIUM SUCCINATE SCH MG: 40 INJECTION, POWDER, FOR SOLUTION INTRAMUSCULAR; INTRAVENOUS at 23:37

## 2018-06-14 RX ADMIN — SODIUM CHLORIDE, PRESERVATIVE FREE SCH ML: 5 INJECTION INTRAVENOUS at 20:28

## 2018-06-14 RX ADMIN — FONDAPARINUX SODIUM SCH MG: 2.5 INJECTION, SOLUTION SUBCUTANEOUS at 08:06

## 2018-06-14 RX ADMIN — METOPROLOL SUCCINATE SCH MG: 50 TABLET, EXTENDED RELEASE ORAL at 08:08

## 2018-06-14 RX ADMIN — IPRATROPIUM BROMIDE AND ALBUTEROL SULFATE SCH ML: .5; 3 SOLUTION RESPIRATORY (INHALATION) at 07:30

## 2018-06-14 RX ADMIN — BUDESONIDE SCH MG: 1 SUSPENSION RESPIRATORY (INHALATION) at 07:29

## 2018-06-14 RX ADMIN — INSULIN ASPART SCH: 100 INJECTION, SOLUTION INTRAVENOUS; SUBCUTANEOUS at 20:26

## 2018-06-14 RX ADMIN — IPRATROPIUM BROMIDE AND ALBUTEROL SULFATE SCH ML: .5; 3 SOLUTION RESPIRATORY (INHALATION) at 10:59

## 2018-06-14 RX ADMIN — METOPROLOL SUCCINATE SCH MG: 50 TABLET, EXTENDED RELEASE ORAL at 20:27

## 2018-06-14 RX ADMIN — MUPIROCIN SCH APPLIC: 20 OINTMENT TOPICAL at 20:27

## 2018-06-14 RX ADMIN — DOCUSATE SODIUM AND SENNOSIDES SCH EACH: 50; 8.6 TABLET ORAL at 20:28

## 2018-06-14 RX ADMIN — ASPIRIN 325 MG ORAL TABLET SCH MG: 325 PILL ORAL at 08:05

## 2018-06-14 RX ADMIN — INSULIN ASPART SCH UNIT: 100 INJECTION, SOLUTION INTRAVENOUS; SUBCUTANEOUS at 12:36

## 2018-06-14 RX ADMIN — IPRATROPIUM BROMIDE AND ALBUTEROL SULFATE SCH ML: .5; 3 SOLUTION RESPIRATORY (INHALATION) at 16:09

## 2018-06-14 RX ADMIN — CLOPIDOGREL BISULFATE SCH MG: 75 TABLET ORAL at 08:05

## 2018-06-14 RX ADMIN — ACETAMINOPHEN SCH MLS/HR: 10 INJECTION, SOLUTION INTRAVENOUS at 11:40

## 2018-06-14 RX ADMIN — Medication SCH MG: at 11:36

## 2018-06-14 RX ADMIN — CIPROFLOXACIN SCH MG: 500 TABLET, FILM COATED ORAL at 10:04

## 2018-06-14 RX ADMIN — ATORVASTATIN CALCIUM SCH MG: 40 TABLET, FILM COATED ORAL at 08:05

## 2018-06-14 RX ADMIN — INSULIN ASPART SCH UNIT: 100 INJECTION, SOLUTION INTRAVENOUS; SUBCUTANEOUS at 17:15

## 2018-06-14 RX ADMIN — IPRATROPIUM BROMIDE AND ALBUTEROL SULFATE PRN ML: .5; 3 SOLUTION RESPIRATORY (INHALATION) at 01:59

## 2018-06-14 RX ADMIN — OXYCODONE HYDROCHLORIDE AND ACETAMINOPHEN SCH MG: 500 TABLET ORAL at 11:36

## 2018-06-14 RX ADMIN — METHYLPREDNISOLONE SODIUM SUCCINATE SCH MG: 40 INJECTION, POWDER, FOR SOLUTION INTRAMUSCULAR; INTRAVENOUS at 08:06

## 2018-06-14 RX ADMIN — METHYLPREDNISOLONE SODIUM SUCCINATE SCH MG: 40 INJECTION, POWDER, FOR SOLUTION INTRAMUSCULAR; INTRAVENOUS at 16:33

## 2018-06-14 NOTE — P.PN
Progress Note - Text





The patient is a 78-year-old gentleman who 3 days previous underwent quadruple 

coronary artery bypass grafting along with exclusion of the left atrial 

appendage for significant left main and overall triple vessel disease and 

recent non-ST segment elevated myocardial infarction.  Patient remains in the 

intensive care unit.  The nurses state that he has not slept well and has been 

somewhat restless.  Patient is up in a chair beside the bed.  Overall alert and 

pleasant.  He does know who I am.  he states that his pain is controlled and he 

is not nauseated.  Apparently also has had episodes of atrial fibrillation with 

rapid ventricular response.





Vital signs: Temperature 98.1 with a pulse of 103, irregular.  Respiratory rate 

of 19.  Blood pressure 113/58 and he is 96% saturated on 5 L nasal cannula.


Lungs are generally clear anteriorly.


No abdominal tenderness.


Pneumatic compression stockings intact.


He does seem somewhat confused but overall alert.  No cranial nerve deficits.  

No focal weakness.





Laboratory


White count is 10.2 with a hemoglobin 8.7 and a platelet count mildly improved 

up to 84.


Sodium 135 with a potassium 4.5.  BUN is 35 with creatinine 0.8 and a GFR of 

86.  Blood sugar 165.





Chest x-ray


Report pending but overall appears fairly clear except for right lower lung 

changes.





Impressions and plans


Overall this 78-year-old gentleman post quadruple coronary artery bypass 

appears to be doing well.  Discussed with nursing staff this morning.  Surgery, 

pulmonary and cardiology notes were regarded.  Presently atrial fib appears to 

be controlled and there is improvement in his heparin-induced thrombocytopenia.

  Continued advancement as per cardiology and surgery.

## 2018-06-14 NOTE — P.PN
Subjective


Progress Note Date: 06/14/18


Principal diagnosis: 





Symptomatic triple-vessel coronary artery disease with left main disease, non-

ST elevation myocardial infarction, mild to moderate left ventricular 

dysfunction.  Chronic persistent atrial fibrillation on home Coumadin therapy.  

Sick sinus syndrome, status post permanent pacemaker insertion.  Chronic 

diastolic congestive heart failure.  Hypertension.  Previous tobacco 

dependence.  Severe chronic obstructive pulmonary disease with home oxygen with 

preoperative FEV1 45% of predicted.  Arthritis.  Benign prostatic hypertrophy.  

Gastroesophageal reflux disease.  Recurrent extremities osteomyelitis with MRSA 

requiring toe and finger amputation.  Lumbar disc disease with chronic pain 

syndrome.  History of deep vein thrombosis of his left leg.  Type 2 diabetes 

mellitus with preoperative hemoglobin A1c 5.9%.  Peripheral vascular disease.  

Remote history of pneumonia.  Family history of early onset coronary artery 

disease with brother being diagnosed with myocardial infarction at age 46.  

Preoperative nasal swab positive for MSSA.  Preoperative sputum culture 

positive for pseudomonas.





POD #3 urgent quadruple coronary artery bypass grafting using the left internal 

mammary artery to the left anterior descending artery, reverse saphenous vein 

graft from the aorta to the first obtuse marginal artery, reverse saphenous 

vein graft from the aorta to the second obtuse marginal artery, reverse 

saphenous vein graft from the aorta to the right coronary artery.  Exclusion of 

the left atrial appendage using a 40 mm AtriClip.  Endoscopic harvesting of the 

right greater saphenous vein.  Intraoperative transesophageal echocardiogram 

and epi-aortic scanning.  Intraoperative graft flow measurements using the 

myaNUMBER system.





Postoperative normocytic, normochromic anemia, an expected outcome of surgery 

related to hemodilution and cardiopulmonary bypass pump.





Heparin-induced thrombocytopenia, an unexpected but potential outcome of IV 

heparin use.





Patient's currently sitting in chair in no acute distress.  Denies pain, 

shortness of breath.  Patient is slightly confused and agitated, per nursing 

staff he did not sleep all night.  He did pull out his arterial line.  

Hemodynamically he is stable.





Objective





- Vital Signs


Vital signs: 


 Vital Signs











Temp  98.3 F   06/14/18 04:00


 


Pulse  94   06/14/18 07:49


 


Resp  21   06/14/18 07:30


 


BP  105/61   06/14/18 07:00


 


Pulse Ox  96   06/14/18 07:38








 Intake & Output











 06/13/18 06/14/18 06/14/18





 18:59 06:59 18:59


 


Intake Total 1112 617 33


 


Output Total 5211 3540 75


 


Balance -603 -633 -42


 


Weight 81.1 kg 80.8 kg 


 


Intake:   


 


   517 33


 


    Lactated Ringers 1,000 ml 360 460 30





    @ 20 mls/hr IV .Q24H [facility]   





    Rx#:[id number]   


 


    Pressure bag 72 57 3


 


  Intake, IV Titration 200 100 





  Amount   


 


    ACETAMINOPHEN IV (For  100 





    ) 1,000 mg In Empty Bag 1   





    bag @ 400 mls/hr IVPB   





    Q6HR [facility]ONDA Rx#:[id number]   


 


    Calcium Chloride 1,000 mg 100  





    In Sodium Chloride 0.9%   





    100 ml @ 100 mls/hr IVPB   





    ONCE STA Rx#:[id number]   


 


  Oral 480  


 


Output:   


 


  Chest Tube Drainage 300 170 


 


    Left Pleural Chest Tube 280 170 


 


    Mediastinal Chest Tube 20  


 


  Drainage 135 110 


 


    Right Calf 135 110 


 


  Urine 1280 970 75


 


Other:   


 


  Voiding Method Indwelling Catheter Indwelling Catheter 








 ABP, PAP, CO, CI - Last Documented











Arterial Blood Pressure        133/59


 


Pulmonary Artery Pressure      25/8


 


Cardiac Output                 5.5


 


Cardiac Index                  3.0

















- Constitutional


General appearance: Present: cooperative





- Respiratory


Details: 





Lungs sounds diminished and coarse bilaterally.  Respirations even, nonlabored.

  Currently on 8 L high flow nasal cannula with oxygen saturation 99%.  Able to 

achieve 2725-1588 mL on his incentive spirometry.  Productive cough.  Left 

pleural chest tube to continuous wall suction, 110 mL serous drainage, 500 mL 

last 24 hours.  No air leak present.





- Cardiovascular


Details: 





S1, S2 present.  Irregular rate and rhythm, atrial fibrillation with occasional 

PVCs and occasional paced beats on telemetry.  Ventricular epicardial pacemaker 

wire present, grounded.  Sternum stable.  Palpable peripheral pulses 

bilaterally.  No edema present.  No calf pain or tenderness noted.  Right 

internal jugular Cordis present.  Heart hugger in place with patient 

demonstrating appropriate use.  Antiembolism stockings, SCDs present.





- Gastrointestinal


Gastrointestinal Comment(s): 





Abdomen soft, nontender, nondistended.  Active bowel sounds 4 quadrants.  

Tolerating diet.  Positive flatus, negative bowel movement.





- Genitourinary


Genitourinary Comment(s): 





Toth present draining clear, yellow urine.  Output  mL/h overnight, 690 

mL total the last 8 hours.





- Integumentary


Integumentary Comment(s): 





Skin is warm and dry.  Anterior chest incision well approximated and covered 

with dry intact dressing.  Right lower extremity EVH site well approximated, JOCE 

drain present with 80 mL serosanguineous drainage in the last 8 hours.





- Neurologic


Neurologic: Present: CNII-XII intact





- Musculoskeletal


Musculoskeletal: Present: generalized weakness, strength equal bilaterally





- Psychiatric


Psychiatric Comment(s): 





Alert and oriented to person and place.  Slightly agitated this morning.





- Allied health notes


Allied health notes reviewed: nursing





- Labs


CBC & Chem 7: 


 06/14/18 04:05





 06/14/18 04:05


Labs: 


 Abnormal Lab Results - Last 24 Hours (Table)











  06/13/18 06/13/18 06/13/18 Range/Units





  11:59 13:19 17:34 


 


RBC     (4.30-5.90)  m/uL


 


Hgb     (13.0-17.5)  gm/dL


 


Hct     (39.0-53.0)  %


 


Plt Count     (150-450)  k/uL


 


Neutrophils #     (1.3-7.7)  k/uL


 


Lymphocytes #     (1.0-4.8)  k/uL


 


ABG pH   7.52 H   (7.35-7.45)  


 


ABG pO2   57 L   ()  mmHg


 


ABG HCO3   29 H   (21-25)  mmol/L


 


ABG Total CO2   30 H   (19-24)  mmol/L


 


Sodium     (137-145)  mmol/L


 


Chloride     ()  mmol/L


 


BUN     (9-20)  mg/dL


 


Glucose     (74-99)  mg/dL


 


POC Glucose (mg/dL)  153 H   181 H  (75-99)  mg/dL


 


AST     (17-59)  U/L


 


Total Protein     (6.3-8.2)  g/dL


 


Albumin     (3.5-5.0)  g/dL














  06/13/18 06/13/18 06/14/18 Range/Units





  17:38 20:44 01:16 


 


RBC     (4.30-5.90)  m/uL


 


Hgb     (13.0-17.5)  gm/dL


 


Hct     (39.0-53.0)  %


 


Plt Count     (150-450)  k/uL


 


Neutrophils #     (1.3-7.7)  k/uL


 


Lymphocytes #     (1.0-4.8)  k/uL


 


ABG pH     (7.35-7.45)  


 


ABG pO2     ()  mmHg


 


ABG HCO3     (21-25)  mmol/L


 


ABG Total CO2     (19-24)  mmol/L


 


Sodium     (137-145)  mmol/L


 


Chloride     ()  mmol/L


 


BUN     (9-20)  mg/dL


 


Glucose     (74-99)  mg/dL


 


POC Glucose (mg/dL)  175 H  199 H  156 H  (75-99)  mg/dL


 


AST     (17-59)  U/L


 


Total Protein     (6.3-8.2)  g/dL


 


Albumin     (3.5-5.0)  g/dL














  06/14/18 06/14/18 Range/Units





  04:05 04:05 


 


RBC   2.97 L  (4.30-5.90)  m/uL


 


Hgb   8.7 L  (13.0-17.5)  gm/dL


 


Hct   26.3 L  (39.0-53.0)  %


 


Plt Count   84 L  (150-450)  k/uL


 


Neutrophils #   9.0 H  (1.3-7.7)  k/uL


 


Lymphocytes #   0.7 L  (1.0-4.8)  k/uL


 


ABG pH    (7.35-7.45)  


 


ABG pO2    ()  mmHg


 


ABG HCO3    (21-25)  mmol/L


 


ABG Total CO2    (19-24)  mmol/L


 


Sodium  135 L   (137-145)  mmol/L


 


Chloride  97 L   ()  mmol/L


 


BUN  35 H   (9-20)  mg/dL


 


Glucose  165 H   (74-99)  mg/dL


 


POC Glucose (mg/dL)    (75-99)  mg/dL


 


AST  111 H   (17-59)  U/L


 


Total Protein  5.0 L   (6.3-8.2)  g/dL


 


Albumin  3.2 L   (3.5-5.0)  g/dL








 Microbiology - Last 24 Hours (Table)











 06/11/18 04:56 Gram Stain - Preliminary





 Sputum Sputum Culture - Preliminary





    Pseudomonas aeruginosa





    Presumptive Staph aureus














- Imaging and Cardiology


Chest x-ray: image reviewed





Assessment and Plan


(1) Family history of early CAD


Current Visit: Yes   Status: Chronic   Code(s): Z82.49 - FAMILY HX OF ISCHEM 

HEART DIS AND OTH DIS OF THE CIRC SYS   SNOMED Code(s): 810915058


   





(2) On home oxygen therapy


Current Visit: Yes   Status: Chronic   Code(s): Z99.81 - DEPENDENCE ON 

SUPPLEMENTAL OXYGEN   SNOMED Code(s): 881872534256


   





(3) BPH (benign prostatic hyperplasia)


Current Visit: Yes   Status: Chronic   Code(s): N40.0 - BENIGN PROSTATIC 

HYPERPLASIA WITHOUT LOWER URINRY TRACT SYMP   SNOMED Code(s): 540960507


   





(4) CAD (coronary artery disease)


Current Visit: Yes   Status: Chronic   Code(s): I25.10 - ATHSCL HEART DISEASE 

OF NATIVE CORONARY ARTERY W/O ANG PCTRS   SNOMED Code(s): 49550809


   





(5) COPD (chronic obstructive pulmonary disease)


Current Visit: Yes   Status: Chronic   Code(s): J44.9 - CHRONIC OBSTRUCTIVE 

PULMONARY DISEASE, UNSPECIFIED   SNOMED Code(s): 37470346


   





(6) Chronic atrial fibrillation


Current Visit: Yes   Status: Chronic   Code(s): I48.2 - CHRONIC ATRIAL 

FIBRILLATION   SNOMED Code(s): 482353025


   





(7) Chronic diastolic CHF (congestive heart failure)


Current Visit: Yes   Status: Chronic   Code(s): I50.32 - CHRONIC DIASTOLIC (

CONGESTIVE) HEART FAILURE   SNOMED Code(s): 139295416


   





(8) Chronic pain syndrome


Current Visit: Yes   Status: Chronic   Code(s): G89.4 - CHRONIC PAIN SYNDROME   

SNOMED Code(s): 422107412


   





(9) Diabetes mellitus type 2 in nonobese


Current Visit: Yes   Status: Chronic   Code(s): E11.9 - TYPE 2 DIABETES 

MELLITUS WITHOUT COMPLICATIONS   SNOMED Code(s): 164789368


   





(10) GERD (gastroesophageal reflux disease)


Current Visit: Yes   Status: Chronic   Code(s): K21.9 - GASTRO-ESOPHAGEAL 

REFLUX DISEASE WITHOUT ESOPHAGITIS   SNOMED Code(s): 684659643


   





(11) History of Coumadin therapy


Current Visit: Yes   Status: Chronic   Code(s): Z92.29 - PERSONAL HISTORY OF 

OTHER DRUG THERAPY   SNOMED Code(s): 706150841


   





(12) History of MRSA infection


Current Visit: No   Status: Resolved   Code(s): Z86.14 - PERSONAL HISTORY OF 

METHICILLIN RESIS STAPH INFECTION   SNOMED Code(s): 300979305


   





(13) History of permanent cardiac pacemaker placement


Current Visit: Yes   Status: Chronic   Code(s): Z95.0 - PRESENCE OF CARDIAC 

PACEMAKER   SNOMED Code(s): 868633254


   





(14) History of sick sinus syndrome


Current Visit: Yes   Status: Chronic   Code(s): Z86.79 - PERSONAL HISTORY OF 

OTHER DISEASES OF THE CIRCULATORY SYSTEM   SNOMED Code(s): 160388339214766


   





(15) History of smoking


Current Visit: No   Status: Resolved   Code(s): Z87.891 - PERSONAL HISTORY OF 

NICOTINE DEPENDENCE   SNOMED Code(s): 72446037359016798


   





(16) Hypertension


Current Visit: Yes   Status: Chronic   Code(s): I10 - ESSENTIAL (PRIMARY) 

HYPERTENSION   SNOMED Code(s): 03694427


   





(17) Lumbar disc disease


Current Visit: Yes   Status: Chronic   Code(s): M51.9 - UNSP THORACIC, 

THORACOLUM AND LUMBOSACR INTVRT DISC DISORDER   SNOMED Code(s): 414609253


   





(18) Non-ST elevated myocardial infarction


Current Visit: Yes   Status: Acute   Code(s): I21.4 - NON-ST ELEVATION (NSTEMI) 

MYOCARDIAL INFARCTION   SNOMED Code(s): 226421712


   





(19) Peripheral vascular disease


Current Visit: Yes   Status: Chronic   Code(s): I73.9 - PERIPHERAL VASCULAR 

DISEASE, UNSPECIFIED   SNOMED Code(s): 770896838


   


Plan: 





1.  Continue aspirin, statin, Plavix, Arixtra, Toprol XL.  Will increase beta 

blocker therapy as tolerated.  Hold Coumadin until all lines and tubes have 

been discontinued.


2.  Wean O2 as tolerated.  Encourage incentive spirometry use 10 times every 

hour while awake.


3.  Encourage continued smoking cessation.


4.  Will discontinue epicardial pacemaker wire.


5.  Will give Lasix 20 mg IV push 1.  DC Toth later today.


6.  Obtain peripheral IV, discontinue Cordis.


7.  Bronchodilators, steroids per pulmonology.  Will discuss initiation of 

antibiotics for preoperative pseudomonas sputum culture with Dr. Denson.


8.  One-to-one sitter at bedside for patient safety.  Will add Seroquel.


9.  Increase activity, out of bed to chair, ambulate as tolerated.  PT/OT/

chronic rehab following.


10.  Will monitor daily labs and x-rays.


11.  Insulin management per Dr. Vang.


12.  Pain management with current medication regimen.


13.  If no significant output in left pleural chest tube today will discontinue 

later today, otherwise will discontinue tomorrow.


14.  May transfer out of ICU to  [facility] later today.


15.  More recommendations to follow.


Time with Patient: Greater than 30

## 2018-06-14 NOTE — XR
EXAMINATION TYPE: XR chest 1V portable

 

DATE OF EXAM: 6/14/2018

 

COMPARISON: Prior chest x-ray 6/13/2018

 

HISTORY: Status post coronary artery bypass graft

 

TECHNIQUE: Single frontal view of the chest is obtained.

 

FINDINGS:  Right jugular central venous sheath remains in place, pacemaker is stable, postop changes 
again noted. Left chest tube is unchanged. No sizable pneumothorax is evident. 5 basilar increased de
nsity is present, there is blunting of the right costophrenic angle. Heart size is stable. Suspect so
me improvement in aeration, lung volume.

 

IMPRESSION:  Small effusion, basilar atelectasis. Postop changes. Additional follow-up recommended.

## 2018-06-14 NOTE — P.PN
Subjective


Progress Note Date: 06/14/18


Principal diagnosis: 


Coronary artery disease, status post coronary artery bypass grafting, postop 

day 1





Progress note dated 06/11/2018





78-year-old male with a history of non-ST segment elevation myocardial 

infarction, status post cardiac catheterization revealing severe triple-vessel 

CAD.  In addition, he is quite severe COPD, with an FEV1 in the range of 45% of 

predicted.  He has multiple additional comorbidities including chronic atrial 

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 

systolic heart failure.  The patient surgery is to be done today.  The patient 

is on O2 3 L nasal cannula and has a dextrose half-normal saline IV at KVO.  He 

was on a heparin drip but obviously that was turned off prior to surgery.  

Other than that, the patient is doing reasonably well.  He certainly is at high 

risk.  This has been documented by my partner.





On 06/12/2018 patient seen in follow-up in the intensive care unit.  This is 

postop day 1, status post quadruple coronary artery bypass grafting using the 

LIMA to LAD, reverse SVG to OM1, reverse SVG to OM 2, reverse SVG to the RCA, 

exclusion of the left atrial appendage, and intraoperative SKIP and AP aortic 

scanning.  Patient was extubated in less than 6 hours post OR exit time.  

Patient was extubated at 9:00 last night successfully to a 40% Ventimask, on 

which she remains this morning.  He is awake alert, sitting up in the recliner, 

doing well, denies any acute distress.  Midsternal incision is stable, clean 

dry and intact


One mediastinal and one left pleural chest tube draining small amount of 

serosanguineous drainage.  Maintenance IV includes LR at a rate of 50 ML per 

hour, insulin drip at 2 units per hour, no vasoactive drips at this time, 

clever proximal has been discontinued, as well as nitroglycerin drip.  Toth 

catheter is in place, and the urine output is ranging from 30-75 ML per hour.  

Cardiac index and output is 5.5 and 3.0 respectively.  Dissipate 

discontinuation of the PA catheter this morning.  PA pressures 28/11, with a 

map of 19.  Patient has not required any blood transfusions.  Today's labs were 

reviewed, WBCs 11.2, hemoglobin is 9.0, INR is 1.4, potassium is 5.3, BUN is 33

, creatinine 0.80.  Today's chest x-ray has been reviewed and shows basilar 

atelectasis versus edema.  Lung sounds positive for some crackles over left 

lower lobe anteriorly, diminished on the right.  Patient Solu-Medrol's has been 

switched to oral prednisone. 





On 06/13/2018 patient seen in follow-up in intensive care unit.  This postop 

day 2, status post coronary artery bypass grafting 4, and exclusion of the 

left atrial appendage.  He is awake and alert, remains on 40% Ventimask, today'

s exam patient is a bit more congested, there is scattered rhonchi, and wheezes 

bilaterally.  His chest x-ray has been reviewed and shows new right basilar 

airspace disease that could represent atelectasis or developing pneumonia, 

stable left basilar subsegmental atelectasis, and trace right pleural effusion.

  Incentive spirometry effort is 800 mL today.  He is still chest tube has been 

removed, left pleural chest tube remains in place with serosanguineous output.  

He remains in A. fib with frequent PVCs.  He was started on Arixtra for 

anticoagulation, he was given a dose of IV digoxin for rate control.  Denies 

any chest pain or dyspnea.  Sternal incision is clean dry and intact, stable.  

Castor-Pam catheter has been removed.  Maintenance in the IV include LR at a 

rate of 30 ML per hour, no other drips.  Which the oral prednisone back to Solu-

Medrol 40 mg every 8 hours, we'll discontinue Symbicort and put the patient on 

Pulmicort. 





On 06/14/2018 patient seen in follow-up in the intensive care unit.  He is 

confused, and only oriented to person and the president.  Nursing staff reports 

patient is not sleeping well, and was having periods of agitation, during which 

she pulled out and IV.  Safety sitter is at the bedside this morning.  Patient 

is awake, alert, and appears to be in no acute distress.  Denies any dyspnea, 

his pain is reasonable control.  Pox on 5 L per nasal cannula is 99%.  

Hemodynamically stable.  Today's chest x-ray has been reviewed by Dr. Denson, 

and shows small pleural effusion, and basilar atelectasis.  Cardiothoracic 

surgery has been dosing patient's IV Lasix, and the patient will receive 

another 20 mg of Lasix this morning.  Continues on nebulized bronchodilators, 

has a loose productive cough with tan-colored thick sputum, and the sputum 

culture was positive for pseudomonas aeruginosa, and presumptive staph aureus.  

She will be started on oral Cipro, and further adjustment antibiotics will be 

made once the final culture and sensitivity is done on organism #2, for 

presumptive staph aureus.  Patient remains afebrile.  Lung sounds are positive 

for scattered rhonchi, and bibasilar crackles.  Patient's incentive spirometry 

effort today's 1250 ML.  Mediastinal chest tube has been discontinued yesterday

, left pleural chest tube remains in place with serosanguineous output.  

Epicardial wires have been discontinued.  Today lab work has been reviewed, and 

shows no leukocytosis, WBCs 10.2, hemoglobin is 8.7, platelet count is 84, 

serum sodium is 135, potassium is 4.5, chloride is 97, BUN 35, creatinine 0.80.

  Patient is tolerating a regular diet.  Nonoliguric.





Objective





- Vital Signs


Vital signs: 


 Vital Signs











Temp  98.1 F   06/14/18 08:00


 


Pulse  105 H  06/14/18 09:00


 


Resp  26 H  06/14/18 09:00


 


BP  128/59   06/14/18 09:00


 


Pulse Ox  99   06/14/18 09:00








 Intake & Output











 06/13/18 06/14/18 06/14/18





 18:59 06:59 18:59


 


Intake Total 1112 617 99


 


Output Total 1715 1250 465


 


Balance -603 -633 -366


 


Weight 81.1 kg 80.8 kg 


 


Intake:   


 


   517 99


 


    Lactated Ringers 1,000 ml 360 460 90





    @ 20 mls/hr IV .Q24H RHONDA   





    Rx#:[id number]   


 


    Pressure bag 72 57 9


 


  Intake, IV Titration 200 100 





  Amount   


 


    ACETAMINOPHEN IV (For  100 





    ) 1,000 mg In Empty Bag 1   





    bag @ 400 mls/hr IVPB   





    Q6HR RHONDA Rx#:[id number]   


 


    Calcium Chloride 1,000 mg 100  





    In Sodium Chloride 0.9%   





    100 ml @ 100 mls/hr IVPB   





    ONCE STA Rx#:[id number]   


 


  Oral 480  


 


Output:   


 


  Chest Tube Drainage 300 170 70


 


    Left Pleural Chest Tube 280 170 70


 


    Mediastinal Chest Tube 20  


 


  Drainage 135 110 


 


    Right Calf 135 110 


 


  Urine 1280 970 395


 


Other:   


 


  Voiding Method Indwelling Catheter Indwelling Catheter 








 ABP, PAP, CO, CI - Last Documented











Arterial Blood Pressure        133/59


 


Pulmonary Artery Pressure      25/8


 


Cardiac Output                 5.5


 


Cardiac Index                  3.0

















- Exam


GENERAL EXAM: Alert, pleasant, 70-year-old white male comfortable in no 

apparent distress, confused, oriented times one, to person but in no acute 

distress with a safety sitter at the bedside.  Joy on 5 L per nasal cannula


HEAD: Normocephalic/atraumatic.


EYES: Normal reaction of pupils, equal size.  Conjunctiva pink, sclera white.


NOSE: Clear with pink turbinates.


THROAT: No erythema or exudates.


NECK: No masses, no JVD, no thyroid enlargement, no adenopathy.  Right IJ 

Cordis with a PA catheter is present


CHEST: No chest wall deformity.  Symmetrical expansion.  Midsternal incision is 

clean dry and intact and stable.  Left pleural chest tubes are present 

connected to Pleur-evac's and to wall suction, with serosanguineous output and 

the collection chambers no air leak noted.  Mediastinal chest tube has been 

discontinued.  Epicardial wires have been discontinued


LUNGS: Equal air entry bilateral rhonchi and scattered wheezes


CVS: Regular rate and rhythm, normal S1 and S2, no gallops, no murmurs, no rubs


ABDOMEN: Soft, nontender.  No hepatosplenomegaly, normal bowel sounds, no 

guarding or rigidity.


EXTREMITIES: No clubbing, no edema, no cyanosis, 2+ pulses and upper and lower 

extremities.  There is a JOCE drain in the right lower leg, right lower leg is 

Ace wrapped


MUSCULOSKELETAL: Muscle strength and tone normal.


SPINE: No scoliosis or deformity


SKIN: No rashes


CENTRAL NERVOUS SYSTEM: Alert and oriented -1.  No focal deficits, tone is 

normal in all 4 extremities.


PSYCHIATRIC: Alert and oriented -1.  Confused











- Labs


CBC & Chem 7: 


 06/14/18 04:05





 06/14/18 04:05


Labs: 


 Abnormal Lab Results - Last 24 Hours (Table)











  06/13/18 06/13/18 06/13/18 Range/Units





  11:59 13:19 17:34 


 


RBC     (4.30-5.90)  m/uL


 


Hgb     (13.0-17.5)  gm/dL


 


Hct     (39.0-53.0)  %


 


Plt Count     (150-450)  k/uL


 


Neutrophils #     (1.3-7.7)  k/uL


 


Lymphocytes #     (1.0-4.8)  k/uL


 


ABG pH   7.52 H   (7.35-7.45)  


 


ABG pO2   57 L   ()  mmHg


 


ABG HCO3   29 H   (21-25)  mmol/L


 


ABG Total CO2   30 H   (19-24)  mmol/L


 


Sodium     (137-145)  mmol/L


 


Chloride     ()  mmol/L


 


BUN     (9-20)  mg/dL


 


Glucose     (74-99)  mg/dL


 


POC Glucose (mg/dL)  153 H   181 H  (75-99)  mg/dL


 


AST     (17-59)  U/L


 


Total Protein     (6.3-8.2)  g/dL


 


Albumin     (3.5-5.0)  g/dL














  06/13/18 06/13/18 06/14/18 Range/Units





  17:38 20:44 01:16 


 


RBC     (4.30-5.90)  m/uL


 


Hgb     (13.0-17.5)  gm/dL


 


Hct     (39.0-53.0)  %


 


Plt Count     (150-450)  k/uL


 


Neutrophils #     (1.3-7.7)  k/uL


 


Lymphocytes #     (1.0-4.8)  k/uL


 


ABG pH     (7.35-7.45)  


 


ABG pO2     ()  mmHg


 


ABG HCO3     (21-25)  mmol/L


 


ABG Total CO2     (19-24)  mmol/L


 


Sodium     (137-145)  mmol/L


 


Chloride     ()  mmol/L


 


BUN     (9-20)  mg/dL


 


Glucose     (74-99)  mg/dL


 


POC Glucose (mg/dL)  175 H  199 H  156 H  (75-99)  mg/dL


 


AST     (17-59)  U/L


 


Total Protein     (6.3-8.2)  g/dL


 


Albumin     (3.5-5.0)  g/dL














  06/14/18 06/14/18 06/14/18 Range/Units





  04:05 04:05 08:00 


 


RBC   2.97 L   (4.30-5.90)  m/uL


 


Hgb   8.7 L   (13.0-17.5)  gm/dL


 


Hct   26.3 L   (39.0-53.0)  %


 


Plt Count   84 L   (150-450)  k/uL


 


Neutrophils #   9.0 H   (1.3-7.7)  k/uL


 


Lymphocytes #   0.7 L   (1.0-4.8)  k/uL


 


ABG pH     (7.35-7.45)  


 


ABG pO2     ()  mmHg


 


ABG HCO3     (21-25)  mmol/L


 


ABG Total CO2     (19-24)  mmol/L


 


Sodium  135 L    (137-145)  mmol/L


 


Chloride  97 L    ()  mmol/L


 


BUN  35 H    (9-20)  mg/dL


 


Glucose  165 H    (74-99)  mg/dL


 


POC Glucose (mg/dL)    206 H  (75-99)  mg/dL


 


AST  111 H    (17-59)  U/L


 


Total Protein  5.0 L    (6.3-8.2)  g/dL


 


Albumin  3.2 L    (3.5-5.0)  g/dL








 Microbiology - Last 24 Hours (Table)











 06/11/18 04:56 Gram Stain - Preliminary





 Sputum Sputum Culture - Preliminary





    Pseudomonas aeruginosa





    Presumptive Staph aureus














Assessment and Plan


Plan: 


Assessment:





Acute non-ST segment elevation myocardial infarction, secondary to severe triple

-vessel CAD, status post quadruple coronary artery bypass grafting with LIMA to 

LAD, reverse SVG to OM1, reverse SVG to OM 2, and reverse SVG to RCA, and 

exclusion of the left atrial appendage with AtriCure clip, postop day 3





Severe COPD, stage III disease, with an FEV1 that is 45% of predicted





Chronic hypoxemic respiratory failure





Chronic atrial fibrillation





Type 2 diabetes mellitus





Diabetic neuropathy





PVOD





Osteomyelitis





History of hypertension





History of systolic heart failure.








Plan: 





Continue with nebulized bronchodilators, continue encouraging incentive 

spirometry, ambulation, continue with safety sitter for patient's confusion, 

and periods of agitation.  We will start the patient on oral Cipro 500 mg twice 

daily for evidence of pseudomonas aeruginosa in the sputum culture, further 

adjustments may have to be made once the second organism in the sputum is 

identified and the sensitivities complete.  It is presumably staph aureus.  

Continue Pulmicort.  Patient will receive another dose of IV Lasix per 

cardiothoracic surgery.  We'll continue to follow





I performed a history & physical examination of the patient and discussed their 

management with my nurse practitioner, Keeley Self.  I reviewed the nurse 

practitioner's note and agree with the documented findings and plan of care.  

Lung sounds are positive for scattered rhonchi.  The findings and the 

impression was discussed with the patient.  I attest to the documentation by 

the nurse practitioner. 


Time with Patient: Greater than 30

## 2018-06-14 NOTE — PN
PROGRESS NOTE



Mr. Boyle underwent coronary artery bypass grafting.  Today is postop day 2.

Yesterday, he was having atrial fibrillation with RVR and today his rates are better

controlled on beta blockers.  However, he has become increasingly confused and somewhat

delirious and did not sleep well last night.  He has severe lung disease and his sputum

cultures grew Pseudomonas and he is being treated for this.



EXAMINATION:

His blood pressure is 112/52 mmHg, pulse rate ranges from 80 to 100 beats per minute,

irregular.  Permanent atrial fibrillation.  Heart sounds S1, S2 soft.  Breath sounds

are reduced bilaterally.  ABDOMEN:  Soft.  Extremities warm.



IMPRESSION:

1. Coronary disease status post coronary bypass grafting.

2. Severe lung disease with chronic obstructive pulmonary disease.

3. Permanent atrial fibrillation.

4. Status post coronary artery bypass grafting for multivessel coronary artery disease

    as well as left atrial appendectomy.



SUGGEST:

Continue rate control medications for atrial fibrillation.  Continue anticoagulation

for now.  Continue aspirin and atorvastatin and Plavix.  Digoxin has been discontinued.

He gets Lasix on a p.r.n. basis.  He is also on metoprolol 50 mg twice daily for rate

control and I will maximize this based upon his response.  Once he is better from a

neuropsychiatric standpoint, he may go to [facility].





MMODL / IJN: [id number] / Job#: [id number]

## 2018-06-15 LAB
ALBUMIN SERPL-MCNC: 3 G/DL (ref 3.5–5)
ALP SERPL-CCNC: 41 U/L (ref 38–126)
ALT SERPL-CCNC: 36 U/L (ref 21–72)
ANION GAP SERPL CALC-SCNC: 7 MMOL/L
AST SERPL-CCNC: 64 U/L (ref 17–59)
BASOPHILS # BLD AUTO: 0 K/UL (ref 0–0.2)
BASOPHILS NFR BLD AUTO: 0 %
BUN SERPL-SCNC: 32 MG/DL (ref 9–20)
CALCIUM SPEC-MCNC: 8.1 MG/DL (ref 8.4–10.2)
CHLORIDE SERPL-SCNC: 100 MMOL/L (ref 98–107)
CO2 SERPL-SCNC: 31 MMOL/L (ref 22–30)
EOSINOPHIL # BLD AUTO: 0.1 K/UL (ref 0–0.7)
EOSINOPHIL NFR BLD AUTO: 1 %
ERYTHROCYTE [DISTWIDTH] IN BLOOD BY AUTOMATED COUNT: 2.95 M/UL (ref 4.3–5.9)
ERYTHROCYTE [DISTWIDTH] IN BLOOD: 17.3 % (ref 11.5–15.5)
GLUCOSE BLD-MCNC: 128 MG/DL (ref 75–99)
GLUCOSE BLD-MCNC: 180 MG/DL (ref 75–99)
GLUCOSE BLD-MCNC: 180 MG/DL (ref 75–99)
GLUCOSE BLD-MCNC: 187 MG/DL (ref 75–99)
GLUCOSE BLD-MCNC: 193 MG/DL (ref 75–99)
GLUCOSE SERPL-MCNC: 165 MG/DL (ref 74–99)
HCT VFR BLD AUTO: 26.5 % (ref 39–53)
HGB BLD-MCNC: 8.8 GM/DL (ref 13–17.5)
LYMPHOCYTES # SPEC AUTO: 0.7 K/UL (ref 1–4.8)
LYMPHOCYTES NFR SPEC AUTO: 8 %
MCH RBC QN AUTO: 29.8 PG (ref 25–35)
MCHC RBC AUTO-ENTMCNC: 33.2 G/DL (ref 31–37)
MCV RBC AUTO: 89.7 FL (ref 80–100)
MONOCYTES # BLD AUTO: 0.3 K/UL (ref 0–1)
MONOCYTES NFR BLD AUTO: 4 %
NEUTROPHILS # BLD AUTO: 7.9 K/UL (ref 1.3–7.7)
NEUTROPHILS NFR BLD AUTO: 87 %
PLATELET # BLD AUTO: 101 K/UL (ref 150–450)
POTASSIUM SERPL-SCNC: 4.3 MMOL/L (ref 3.5–5.1)
PROT SERPL-MCNC: 4.9 G/DL (ref 6.3–8.2)
SODIUM SERPL-SCNC: 138 MMOL/L (ref 137–145)
WBC # BLD AUTO: 9.1 K/UL (ref 3.8–10.6)

## 2018-06-15 RX ADMIN — PANTOPRAZOLE SODIUM SCH MG: 40 TABLET, DELAYED RELEASE ORAL at 07:12

## 2018-06-15 RX ADMIN — GLIMEPIRIDE SCH MG: 1 TABLET ORAL at 08:17

## 2018-06-15 RX ADMIN — CIPROFLOXACIN SCH MG: 500 TABLET, FILM COATED ORAL at 08:17

## 2018-06-15 RX ADMIN — ATORVASTATIN CALCIUM SCH MG: 40 TABLET, FILM COATED ORAL at 08:17

## 2018-06-15 RX ADMIN — SODIUM CHLORIDE, PRESERVATIVE FREE SCH ML: 5 INJECTION INTRAVENOUS at 08:18

## 2018-06-15 RX ADMIN — MUPIROCIN SCH APPLIC: 20 OINTMENT TOPICAL at 21:27

## 2018-06-15 RX ADMIN — METHYLPREDNISOLONE SODIUM SUCCINATE SCH: 125 INJECTION, POWDER, FOR SOLUTION INTRAMUSCULAR; INTRAVENOUS at 10:17

## 2018-06-15 RX ADMIN — MUPIROCIN SCH: 20 OINTMENT TOPICAL at 10:17

## 2018-06-15 RX ADMIN — TAMSULOSIN HYDROCHLORIDE SCH: 0.4 CAPSULE ORAL at 10:16

## 2018-06-15 RX ADMIN — ASPIRIN 325 MG ORAL TABLET SCH MG: 325 PILL ORAL at 08:17

## 2018-06-15 RX ADMIN — ASPIRIN 81 MG CHEWABLE TABLET SCH: 81 TABLET CHEWABLE at 10:16

## 2018-06-15 RX ADMIN — MUPIROCIN SCH APPLIC: 20 OINTMENT TOPICAL at 10:00

## 2018-06-15 RX ADMIN — BUDESONIDE SCH MG: 1 SUSPENSION RESPIRATORY (INHALATION) at 20:23

## 2018-06-15 RX ADMIN — LEVOFLOXACIN SCH MG: 750 TABLET, FILM COATED ORAL at 15:10

## 2018-06-15 RX ADMIN — INSULIN ASPART SCH: 100 INJECTION, SOLUTION INTRAVENOUS; SUBCUTANEOUS at 10:15

## 2018-06-15 RX ADMIN — SODIUM CHLORIDE, PRESERVATIVE FREE SCH ML: 5 INJECTION INTRAVENOUS at 21:27

## 2018-06-15 RX ADMIN — GABAPENTIN SCH MG: 100 CAPSULE ORAL at 08:18

## 2018-06-15 RX ADMIN — TAMSULOSIN HYDROCHLORIDE SCH MG: 0.4 CAPSULE ORAL at 08:17

## 2018-06-15 RX ADMIN — METOPROLOL SUCCINATE SCH MG: 50 TABLET, EXTENDED RELEASE ORAL at 21:26

## 2018-06-15 RX ADMIN — PANTOPRAZOLE SODIUM SCH: 40 TABLET, DELAYED RELEASE ORAL at 10:16

## 2018-06-15 RX ADMIN — POTASSIUM CHLORIDE SCH: 14.9 INJECTION, SOLUTION INTRAVENOUS at 10:08

## 2018-06-15 RX ADMIN — INSULIN ASPART SCH UNIT: 100 INJECTION, SOLUTION INTRAVENOUS; SUBCUTANEOUS at 07:12

## 2018-06-15 RX ADMIN — GABAPENTIN SCH MG: 100 CAPSULE ORAL at 21:26

## 2018-06-15 RX ADMIN — FUROSEMIDE SCH: 40 TABLET ORAL at 10:16

## 2018-06-15 RX ADMIN — IPRATROPIUM BROMIDE AND ALBUTEROL SULFATE SCH ML: .5; 3 SOLUTION RESPIRATORY (INHALATION) at 20:23

## 2018-06-15 RX ADMIN — DIGOXIN SCH: 250 TABLET ORAL at 10:16

## 2018-06-15 RX ADMIN — METHYLPREDNISOLONE SODIUM SUCCINATE SCH MG: 40 INJECTION, POWDER, FOR SOLUTION INTRAMUSCULAR; INTRAVENOUS at 08:17

## 2018-06-15 RX ADMIN — INSULIN ASPART SCH UNIT: 100 INJECTION, SOLUTION INTRAVENOUS; SUBCUTANEOUS at 12:54

## 2018-06-15 RX ADMIN — IPRATROPIUM BROMIDE AND ALBUTEROL SULFATE SCH ML: .5; 3 SOLUTION RESPIRATORY (INHALATION) at 11:05

## 2018-06-15 RX ADMIN — IPRATROPIUM BROMIDE AND ALBUTEROL SULFATE SCH ML: .5; 3 SOLUTION RESPIRATORY (INHALATION) at 07:25

## 2018-06-15 RX ADMIN — CLOPIDOGREL BISULFATE SCH MG: 75 TABLET ORAL at 08:18

## 2018-06-15 RX ADMIN — METOPROLOL SUCCINATE SCH MG: 50 TABLET, EXTENDED RELEASE ORAL at 08:18

## 2018-06-15 RX ADMIN — GABAPENTIN SCH MG: 100 CAPSULE ORAL at 15:10

## 2018-06-15 RX ADMIN — INSULIN ASPART SCH UNIT: 100 INJECTION, SOLUTION INTRAVENOUS; SUBCUTANEOUS at 17:14

## 2018-06-15 RX ADMIN — BUDESONIDE SCH MG: 1 SUSPENSION RESPIRATORY (INHALATION) at 07:25

## 2018-06-15 RX ADMIN — DOCUSATE SODIUM AND SENNOSIDES SCH EACH: 50; 8.6 TABLET ORAL at 21:26

## 2018-06-15 RX ADMIN — GABAPENTIN SCH: 100 CAPSULE ORAL at 10:16

## 2018-06-15 RX ADMIN — IPRATROPIUM BROMIDE AND ALBUTEROL SULFATE SCH ML: .5; 3 SOLUTION RESPIRATORY (INHALATION) at 15:44

## 2018-06-15 RX ADMIN — ATORVASTATIN CALCIUM SCH: 80 TABLET, FILM COATED ORAL at 10:16

## 2018-06-15 RX ADMIN — FONDAPARINUX SODIUM SCH MG: 2.5 INJECTION, SOLUTION SUBCUTANEOUS at 08:17

## 2018-06-15 RX ADMIN — INSULIN ASPART SCH: 100 INJECTION, SOLUTION INTRAVENOUS; SUBCUTANEOUS at 21:06

## 2018-06-15 RX ADMIN — Medication SCH MG: at 21:26

## 2018-06-15 NOTE — P.PN
Subjective


Progress Note Date: 06/15/18


Principal diagnosis: 


Coronary artery disease, status post coronary artery bypass grafting, postop 

day 1





Progress note dated 06/11/2018





78-year-old male with a history of non-ST segment elevation myocardial 

infarction, status post cardiac catheterization revealing severe triple-vessel 

CAD.  In addition, he is quite severe COPD, with an FEV1 in the range of 45% of 

predicted.  He has multiple additional comorbidities including chronic atrial 

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 

systolic heart failure.  The patient surgery is to be done today.  The patient 

is on O2 3 L nasal cannula and has a dextrose half-normal saline IV at KVO.  He 

was on a heparin drip but obviously that was turned off prior to surgery.  

Other than that, the patient is doing reasonably well.  He certainly is at high 

risk.  This has been documented by my partner.





On 06/12/2018 patient seen in follow-up in the intensive care unit.  This is 

postop day 1, status post quadruple coronary artery bypass grafting using the 

LIMA to LAD, reverse SVG to OM1, reverse SVG to OM 2, reverse SVG to the RCA, 

exclusion of the left atrial appendage, and intraoperative SKIP and AP aortic 

scanning.  Patient was extubated in less than 6 hours post OR exit time.  

Patient was extubated at 9:00 last night successfully to a 40% Ventimask, on 

which she remains this morning.  He is awake alert, sitting up in the recliner, 

doing well, denies any acute distress.  Midsternal incision is stable, clean 

dry and intact


One mediastinal and one left pleural chest tube draining small amount of 

serosanguineous drainage.  Maintenance IV includes LR at a rate of 50 ML per 

hour, insulin drip at 2 units per hour, no vasoactive drips at this time, 

clever proximal has been discontinued, as well as nitroglycerin drip.  Toth 

catheter is in place, and the urine output is ranging from 30-75 ML per hour.  

Cardiac index and output is 5.5 and 3.0 respectively.  Dissipate 

discontinuation of the PA catheter this morning.  PA pressures 28/11, with a 

map of 19.  Patient has not required any blood transfusions.  Today's labs were 

reviewed, WBCs 11.2, hemoglobin is 9.0, INR is 1.4, potassium is 5.3, BUN is 33

, creatinine 0.80.  Today's chest x-ray has been reviewed and shows basilar 

atelectasis versus edema.  Lung sounds positive for some crackles over left 

lower lobe anteriorly, diminished on the right.  Patient Solu-Medrol's has been 

switched to oral prednisone. 





On 06/13/2018 patient seen in follow-up in intensive care unit.  This postop 

day 2, status post coronary artery bypass grafting 4, and exclusion of the 

left atrial appendage.  He is awake and alert, remains on 40% Ventimask, today'

s exam patient is a bit more congested, there is scattered rhonchi, and wheezes 

bilaterally.  His chest x-ray has been reviewed and shows new right basilar 

airspace disease that could represent atelectasis or developing pneumonia, 

stable left basilar subsegmental atelectasis, and trace right pleural effusion.

  Incentive spirometry effort is 800 mL today.  He is still chest tube has been 

removed, left pleural chest tube remains in place with serosanguineous output.  

He remains in A. fib with frequent PVCs.  He was started on Arixtra for 

anticoagulation, he was given a dose of IV digoxin for rate control.  Denies 

any chest pain or dyspnea.  Sternal incision is clean dry and intact, stable.  

[place]-Pam catheter has been removed.  Maintenance in the IV include LR at a 

rate of 30 ML per hour, no other drips.  Which the oral prednisone back to Solu-

Medrol 40 mg every 8 hours, we'll discontinue Symbicort and put the patient on 

Pulmicort. 





On 06/14/2018 patient seen in follow-up in the intensive care unit.  He is 

confused, and only oriented to person and the president.  Nursing staff reports 

patient is not sleeping well, and was having periods of agitation, during which 

she pulled out and IV.  Safety sitter is at the bedside this morning.  Patient 

is awake, alert, and appears to be in no acute distress.  Denies any dyspnea, 

his pain is reasonable control.  [place] on 5 L per nasal cannula is 99%.  

Hemodynamically stable.  Today's chest x-ray has been reviewed by Dr. Denson, 

and shows small pleural effusion, and basilar atelectasis.  Cardiothoracic 

surgery has been dosing patient's IV Lasix, and the patient will receive 

another 20 mg of Lasix this morning.  Continues on nebulized bronchodilators, 

has a loose productive cough with tan-colored thick sputum, and the sputum 

culture was positive for pseudomonas aeruginosa, and presumptive staph aureus.  

She will be started on oral Cipro, and further adjustment antibiotics will be 

made once the final culture and sensitivity is done on organism #2, for 

presumptive staph aureus.  Patient remains afebrile.  Lung sounds are positive 

for scattered rhonchi, and bibasilar crackles.  Patient's incentive spirometry 

effort today's 1250 ML.  Mediastinal chest tube has been discontinued yesterday

, left pleural chest tube remains in place with serosanguineous output.  

Epicardial wires have been discontinued.  Today lab work has been reviewed, and 

shows no leukocytosis, WBCs 10.2, hemoglobin is 8.7, platelet count is 84, 

serum sodium is 135, potassium is 4.5, chloride is 97, BUN 35, creatinine 0.80.

  Patient is tolerating a regular diet.  Nonoliguric.





On 06/15/2018 patient seen again in follow-up in the intensive care unit.  He 

sitting up in the recliner, currently on 3 L per nasal cannula, denies any 

acute distress, no chest pain, no shortness of breath, no cough or congestion.  

Lung sounds are diminished with a few scattered rhonchi, no significant 

wheezing noted.  Overall improved compared to yesterday's exam.  Midsternal 

incision is clean dry and intact, sternum stable, no edema present.  Hugger is 

in place, and the patient is utilizing his incentive spirometer able to achieve 

0172-6592 mL today.  Today's chest x-ray has been reviewed and still shows 

small pleural effusions, and basilar atelectasis, stable exam.  Patient remains 

in A. fib, with a controlled rate, he had been started on Arixtra for 

anticoagulation, he was given 20 mg of IV Lasix yesterday, and he is in -1558 

mL.  His left pleural chest tube has been discontinued.  No IV fluids or drips 

at this time, . he is tolerating oral diet, no fever or chills, microbiology 

results were reviewed, and the sputum culture was positive for pseudomonas 

aeruginosa, and Staphylococcus aureus, oxacillin sensitive.  Both organisms are 

susceptible to Levaquin, hence we will switch to Cipro to Levaquin. 





Objective





- Vital Signs


Vital signs: 


 Vital Signs











Temp  98.7 F   06/15/18 04:00


 


Pulse  76   06/15/18 07:41


 


Resp  18   06/15/18 07:00


 


BP  105/59   06/15/18 07:00


 


Pulse Ox  94 L  06/15/18 07:00








 Intake & Output











 06/14/18 06/15/18 06/15/18





 18:59 06:59 18:59


 


Intake Total 372  


 


Output Total 1140 790 0


 


Balance -768 -790 0


 


Weight 80.8 kg 77.1 kg 


 


Intake:   


 


    


 


    Lactated Ringers 1,000 ml 120  





    @ 20 mls/hr IV .Q24H [facility]   





    Rx#:[id number]   


 


    Pressure bag 12  


 


  Oral 240  


 


Output:   


 


  Chest Tube Drainage 70  


 


    Left Pleural Chest Tube 70  


 


  Drainage 40 40 


 


    Right Calf 40 40 


 


  Urine 1030 750 0


 


Other:   


 


  Voiding Method Urinal Urinal 


 


  # Voids  1 








 ABP, PAP, CO, CI - Last Documented











Arterial Blood Pressure        133/59


 


Pulmonary Artery Pressure      25/8


 


Cardiac Output                 5.5


 


Cardiac Index                  3.0

















- Exam


GENERAL EXAM: Alert, pleasant, 70-year-old white male comfortable in no 

apparent distress, confused, oriented times one, to person but in no acute 

distress with a safety sitter at the bedside.  [place] on 5 L per nasal cannula


HEAD: Normocephalic/atraumatic.


EYES: Normal reaction of pupils, equal size.  Conjunctiva pink, sclera white.


NOSE: Clear with pink turbinates.


THROAT: No erythema or exudates.


NECK: No masses, no JVD, no thyroid enlargement, no adenopathy.  Right IJ 

[place] with a PA catheter is present


CHEST: No chest wall deformity.  Symmetrical expansion.  Midsternal incision is 

clean dry and intact and stable.  Mediastinal, left pleural chest tubes and 

epicardial wires have been all discontinued


LUNGS: Equal air entry bilateral rhonchi and scattered wheezes


CVS: Irregular rate and rhythm, normal S1 and S2, no gallops, no murmurs, no 

rubs


ABDOMEN: Soft, nontender.  No hepatosplenomegaly, normal bowel sounds, no 

guarding or rigidity.


EXTREMITIES: No clubbing, no edema, no cyanosis, 2+ pulses and upper and lower 

extremities.  There is a JOCE drain in the right lower leg, right lower leg is 

Ace wrapped


MUSCULOSKELETAL: Muscle strength and tone normal.


SPINE: No scoliosis or deformity


SKIN: No rashes


CENTRAL NERVOUS SYSTEM: Alert and oriented -3.  No focal deficits, tone is 

normal in all 4 extremities.


PSYCHIATRIC: Alert and oriented -3.  Mentation is much improved on today's exam











- Labs


CBC & Chem 7: 


 06/15/18 04:34





 06/15/18 04:34


Labs: 


 Abnormal Lab Results - Last 24 Hours (Table)











  06/14/18 06/14/18 06/14/18 Range/Units





  11:50 17:12 20:23 


 


RBC     (4.30-5.90)  m/uL


 


Hgb     (13.0-17.5)  gm/dL


 


Hct     (39.0-53.0)  %


 


RDW     (11.5-15.5)  %


 


Plt Count     (150-450)  k/uL


 


Neutrophils #     (1.3-7.7)  k/uL


 


Lymphocytes #     (1.0-4.8)  k/uL


 


Carbon Dioxide     (22-30)  mmol/L


 


BUN     (9-20)  mg/dL


 


Glucose     (74-99)  mg/dL


 


POC Glucose (mg/dL)  191 H  190 H  128 H  (75-99)  mg/dL


 


Calcium     (8.4-10.2)  mg/dL


 


AST     (17-59)  U/L


 


Total Protein     (6.3-8.2)  g/dL


 


Albumin     (3.5-5.0)  g/dL














  06/15/18 06/15/18 06/15/18 Range/Units





  02:13 04:34 04:34 


 


RBC   2.95 L   (4.30-5.90)  m/uL


 


Hgb   8.8 L   (13.0-17.5)  gm/dL


 


Hct   26.5 L   (39.0-53.0)  %


 


RDW   17.3 H   (11.5-15.5)  %


 


Plt Count   101 L   (150-450)  k/uL


 


Neutrophils #   7.9 H   (1.3-7.7)  k/uL


 


Lymphocytes #   0.7 L   (1.0-4.8)  k/uL


 


Carbon Dioxide    31 H  (22-30)  mmol/L


 


BUN    32 H  (9-20)  mg/dL


 


Glucose    165 H  (74-99)  mg/dL


 


POC Glucose (mg/dL)  180 H    (75-99)  mg/dL


 


Calcium    8.1 L  (8.4-10.2)  mg/dL


 


AST    64 H  (17-59)  U/L


 


Total Protein    4.9 L  (6.3-8.2)  g/dL


 


Albumin    3.0 L  (3.5-5.0)  g/dL














  06/15/18 Range/Units





  07:09 


 


RBC   (4.30-5.90)  m/uL


 


Hgb   (13.0-17.5)  gm/dL


 


Hct   (39.0-53.0)  %


 


RDW   (11.5-15.5)  %


 


Plt Count   (150-450)  k/uL


 


Neutrophils #   (1.3-7.7)  k/uL


 


Lymphocytes #   (1.0-4.8)  k/uL


 


Carbon Dioxide   (22-30)  mmol/L


 


BUN   (9-20)  mg/dL


 


Glucose   (74-99)  mg/dL


 


POC Glucose (mg/dL)  180 H  (75-99)  mg/dL


 


Calcium   (8.4-10.2)  mg/dL


 


AST   (17-59)  U/L


 


Total Protein   (6.3-8.2)  g/dL


 


Albumin   (3.5-5.0)  g/dL








 Microbiology - Last 24 Hours (Table)











 06/11/18 04:56 Gram Stain - Final





 Sputum Sputum Culture - Final





    Pseudomonas aeruginosa





    Staphylococcus aureus














Assessment and Plan


Plan: 


Assessment:





Acute non-ST segment elevation myocardial infarction, secondary to severe triple

-vessel CAD, status post quadruple coronary artery bypass grafting with LIMA to 

LAD, reverse SVG to OM1, reverse SVG to OM 2, and reverse SVG to RCA, and 

exclusion of the left atrial appendage with AtriCure clip, postop day 4





Severe COPD, stage III disease, with an FEV1 that is 45% of predicted





Chronic hypoxemic respiratory failure





Sputum cultures positive for pseudomonas aeruginosa and oxacillin sensitive 

Staphylococcus aureus from's 06/11/2018





Chronic atrial fibrillation





Type 2 diabetes mellitus





Diabetic neuropathy





PVOD





Osteomyelitis





History of hypertension





History of systolic heart failure.








Plan: 





Continue current medical treatment, nebulas bronchodilators.  Continue 

pulmonary toileting, ambulation.  We will switch the Cipro to seven-day course 

of oral Levaquin for pseudomonal and oxacillin sensitive staph aureus found in 

the sputum cultures.  We'll switch the IV Solu-Medrol to oral prednisone. 

Patient is hemodynamically stable, mentation has significantly improved.  

Tolerating oral intake, nonoliguric.  Patient is stable to transfer out of 

intensive care unit today once cleared by cardiothoracic surgery.


I performed a history & physical examination of the patient and discussed their 

management with my nurse practitioner, Keeley Self.  I reviewed the nurse 

practitioner's note and agree with the documented findings and plan of care.  

Lung sounds are coarse lung sounds, with a few rhonchi.  The findings and the 

impression was discussed with the patient.  I attest to the documentation by 

the nurse practitioner. 


Time with Patient: Greater than 30

## 2018-06-15 NOTE — P.PN
Progress Note - Text








The patient is a 78-year-old gentleman who has undergone quadruple coronary 

bypass grafting.  He presently is in the intensive care unit with the 

likelihood of being transferred to a cardiac floor later today.  Patient is 

sitting up in the chair at the side of the bed.  Alert and oriented today.  

States his pain is better controlled.  He does have some intermittent shortness 

of breath.  No nausea or vomiting.





Vital signs: Pulse of 76 with respirations 18 and blood pressure 105/59.  He is 

94% saturated on 3 L.  Last temperature was 98.7 axillary.


Lungs are generally clear although diminished at bases.


Heart tones were slightly irregular.  Rate controlled.


Abdomen nontender.


Compression appliances intact.  No marked edema noted.


Oriented 3.  No focal weakness.





Laboratory


White count was 9.1 with a hemoglobin of 8.1 and a platelet count of 101.


Potassium 4.3.  BUN of 32 with a creatinine 0.77 given him a GFR of 87.  blood 

sugars in the 160-180 range.


Albumin 3.0.





Chest x-ray official report pending but overall appears fairly clear.





Impressions and plans


We will add Amaryl for his blood sugar.  Continue with Accu-Cheks and coverage.


Consults have been previously placed with physical and occupational therapy.


At this point patient does appear to be ready to be moved to the cardiac floor.


We'll continue to follow.

## 2018-06-15 NOTE — PN
PROGRESS NOTE



Patient is doing well today.  He is much more alert and awake. He had a good night's

sleep.  He is eating lunch.  He looks very comfortable.  No respiratory distress.

Denies chest pain.  Denies any breathing trouble.



Vitals are stable.  He remains in atrial fibrillation with a controlled ventricular

response on metoprolol. He is on Toprol-XL 50 b.i.d.  He is also on aspirin, Plavix and

statins.  Heart rates are in the 70s and 80s, irregular.  Blood pressure is 105/54

mmHg.

Breath sounds are reduced bilaterally.  No rhonchi, no crackles.  Heart sounds are

irregular, but normal.  Extremities are warm, no edema.  Chest tubes have been removed.



IMPRESSION:

1. Multivessel coronary artery disease, status post coronary artery bypass grafting.

2. Permanent atrial fibrillation, status post left atrial appendectomy.



SUGGEST:

Continue metoprolol for rate control.  Continue aspirin and Plavix.  He should be

anticoagulated for the next 2-3 months even after his left atrial appendectomy.





MMODL / IJN: [id number] / Job#: [id number]

## 2018-06-15 NOTE — P.PN
Subjective


Progress Note Date: 06/15/18


Principal diagnosis: 





Symptomatic triple-vessel coronary artery disease with left main disease, non-

ST elevation myocardial infarction, mild to moderate left ventricular 

dysfunction.  Chronic persistent atrial fibrillation on home Coumadin therapy.  

Sick sinus syndrome, status post permanent pacemaker insertion.  Chronic 

systolic heart failure with EF 40-45% and diastolic dysfunction.  Hypertension.

  Hyperlipidemia. Bilateral carotid stenosis, 50-79% bilaterally, Previous 

tobacco dependence.  Severe chronic obstructive pulmonary disease with home 

oxygen with preoperative FEV1 45% of predicted.  Arthritis.  Benign prostatic 

hypertrophy.  Gastroesophageal reflux disease.  Recurrent extremities 

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  Lumbar disc 

disease with chronic pain syndrome.  History of deep vein thrombosis of his 

left leg.  Type 2 diabetes mellitus with preoperative hemoglobin A1c 5.9%.  

Peripheral vascular disease.  Remote history of pneumonia.  Family history of 

early onset coronary artery disease with brother being diagnosed with 

myocardial infarction at age 46.  Preoperative nasal swab positive for MSSA.  

Preoperative sputum culture positive for pseudomonas and MSSA.





POD #4 urgent quadruple coronary artery bypass grafting using the left internal 

mammary artery to the left anterior descending artery, reverse saphenous vein 

graft from the aorta to the first obtuse marginal artery, reverse saphenous 

vein graft from the aorta to the second obtuse marginal artery, reverse 

saphenous vein graft from the aorta to the right coronary artery.  Exclusion of 

the left atrial appendage using a 40 mm AtriClip.  Endoscopic harvesting of the 

right greater saphenous vein.  Intraoperative transesophageal echocardiogram 

and epi-aortic scanning.  Intraoperative graft flow measurements using the 

Medistim system.





Postoperative normocytic, normochromic anemia, an expected outcome of surgery 

related to hemodilution and cardiopulmonary bypass pump.





Heparin-induced thrombocytopenia, an unexpected but potential outcome of IV 

heparin use.





Patient's currently sitting in chair in no acute distress.  Denies pain, 

appears slightly short of breath.  Patient is still confused but better than 

yesterday, per nursing staff he did sleep last night.  Left pleural chest tube 

and ventricular epicardial pacemaker wire discontinued yesterday.





Objective





- Vital Signs


Vital signs: 


 Vital Signs











Temp  98.7 F   06/15/18 04:00


 


Pulse  84   06/15/18 07:00


 


Resp  18   06/15/18 07:00


 


BP  105/59   06/15/18 07:00


 


Pulse Ox  94 L  06/15/18 07:00








 Intake & Output











 06/14/18 06/15/18 06/15/18





 18:59 06:59 18:59


 


Intake Total 372  


 


Output Total 1140 790 0


 


Balance -768 -790 0


 


Weight 80.8 kg 77.1 kg 


 


Intake:   


 


    


 


    Lactated Ringers 1,000 ml 120  





    @ 20 mls/hr IV .Q24H [facility]   





    Rx#:[id number]   


 


    Pressure bag 12  


 


  Oral 240  


 


Output:   


 


  Chest Tube Drainage 70  


 


    Left Pleural Chest Tube 70  


 


  Drainage 40 40 


 


    Right Calf 40 40 


 


  Urine 1030 750 0


 


Other:   


 


  Voiding Method Urinal Urinal 


 


  # Voids  1 








 ABP, PAP, CO, CI - Last Documented











Arterial Blood Pressure        133/59


 


Pulmonary Artery Pressure      25/8


 


Cardiac Output                 5.5


 


Cardiac Index                  3.0

















- Constitutional


General appearance: Present: cooperative, no acute distress





- Respiratory


Details: 





Lungs sounds diminished and coarse bilaterally with faint expiratory wheezes 

present.  Respirations even, slightly short of breath.  Currently on 3 L nasal 

cannula with oxygen saturation 97%.  Able to achieve 750 mL on his incentive 

spirometry.  Wet cough.





- Cardiovascular


Details: 





S1, S2 present.  Irregular rate and rhythm, atrial fibrillation on telemetry.   

Sternum stable.  Palpable peripheral pulses bilaterally.  No edema present.  No 

calf pain or tenderness noted.   Heart hugger in place with patient 

demonstrating appropriate use.  Antiembolism stockings, SCDs present.








- Gastrointestinal


Gastrointestinal Comment(s): 





Abdomen soft, nontender, nondistended.  Active bowel sounds 4 quadrants.  

Tolerating diet.  Positive flatus, negative bowel movement.








- Genitourinary


Genitourinary Comment(s): 





Toth discontinued yesterday.  Excellent diuresis after IV Lasix given.  

Positive void 300 mL last night.





- Integumentary


Integumentary Comment(s): 





Skin is warm and dry.  Anterior chest incision well approximated and covered 

with dry intact dressing.  Right lower extremity EVH site well approximated, JOCE 

drain present with 40 mL serosanguineous drainage in the last 8 hours.








- Neurologic


Neurologic: Present: CNII-XII intact





- Musculoskeletal


Musculoskeletal: Present: gait normal, strength equal bilaterally





- Psychiatric


Psychiatric Comment(s): 





Alert and oriented to person and place, still confused regarding time.  Much 

more appropriate, less agitated.


Psychiatric: Present: appropriate affect





- Allied health notes


Allied health notes reviewed: nursing





- Labs


CBC & Chem 7: 


 06/15/18 04:34





 06/15/18 04:34


Labs: 


 Abnormal Lab Results - Last 24 Hours (Table)











  06/14/18 06/14/18 06/14/18 Range/Units





  08:00 11:50 17:12 


 


RBC     (4.30-5.90)  m/uL


 


Hgb     (13.0-17.5)  gm/dL


 


Hct     (39.0-53.0)  %


 


RDW     (11.5-15.5)  %


 


Plt Count     (150-450)  k/uL


 


Neutrophils #     (1.3-7.7)  k/uL


 


Lymphocytes #     (1.0-4.8)  k/uL


 


Carbon Dioxide     (22-30)  mmol/L


 


BUN     (9-20)  mg/dL


 


Glucose     (74-99)  mg/dL


 


POC Glucose (mg/dL)  206 H  191 H  190 H  (75-99)  mg/dL


 


Calcium     (8.4-10.2)  mg/dL


 


AST     (17-59)  U/L


 


Total Protein     (6.3-8.2)  g/dL


 


Albumin     (3.5-5.0)  g/dL














  06/14/18 06/15/18 06/15/18 Range/Units





  20:23 02:13 04:34 


 


RBC    2.95 L  (4.30-5.90)  m/uL


 


Hgb    8.8 L  (13.0-17.5)  gm/dL


 


Hct    26.5 L  (39.0-53.0)  %


 


RDW    17.3 H  (11.5-15.5)  %


 


Plt Count    101 L  (150-450)  k/uL


 


Neutrophils #    7.9 H  (1.3-7.7)  k/uL


 


Lymphocytes #    0.7 L  (1.0-4.8)  k/uL


 


Carbon Dioxide     (22-30)  mmol/L


 


BUN     (9-20)  mg/dL


 


Glucose     (74-99)  mg/dL


 


POC Glucose (mg/dL)  128 H  180 H   (75-99)  mg/dL


 


Calcium     (8.4-10.2)  mg/dL


 


AST     (17-59)  U/L


 


Total Protein     (6.3-8.2)  g/dL


 


Albumin     (3.5-5.0)  g/dL














  06/15/18 06/15/18 Range/Units





  04:34 07:09 


 


RBC    (4.30-5.90)  m/uL


 


Hgb    (13.0-17.5)  gm/dL


 


Hct    (39.0-53.0)  %


 


RDW    (11.5-15.5)  %


 


Plt Count    (150-450)  k/uL


 


Neutrophils #    (1.3-7.7)  k/uL


 


Lymphocytes #    (1.0-4.8)  k/uL


 


Carbon Dioxide  31 H   (22-30)  mmol/L


 


BUN  32 H   (9-20)  mg/dL


 


Glucose  165 H   (74-99)  mg/dL


 


POC Glucose (mg/dL)   180 H  (75-99)  mg/dL


 


Calcium  8.1 L   (8.4-10.2)  mg/dL


 


AST  64 H   (17-59)  U/L


 


Total Protein  4.9 L   (6.3-8.2)  g/dL


 


Albumin  3.0 L   (3.5-5.0)  g/dL








 Microbiology - Last 24 Hours (Table)











 06/11/18 04:56 Gram Stain - Final





 Sputum Sputum Culture - Final





    Pseudomonas aeruginosa





    Staphylococcus aureus














- Imaging and Cardiology


Chest x-ray: image reviewed





Assessment and Plan


(1) Family history of early CAD


Current Visit: Yes   Status: Chronic   Code(s): Z82.49 - FAMILY HX OF ISCHEM 

HEART DIS AND OTH DIS OF THE [facility]   SNOMED Code(s): 145346512


   





(2) On home oxygen therapy


Current Visit: Yes   Status: Chronic   Code(s): Z99.81 - DEPENDENCE ON 

SUPPLEMENTAL OXYGEN   SNOMED Code(s): 496771294036


   





(3) BPH (benign prostatic hyperplasia)


Current Visit: Yes   Status: Chronic   Code(s): N40.0 - BENIGN PROSTATIC 

HYPERPLASIA WITHOUT LOWER URINRY TRACT SYMP   SNOMED Code(s): 078947302


   





(4) CAD (coronary artery disease)


Current Visit: Yes   Status: Chronic   Code(s): I25.10 - ATHSCL HEART DISEASE 

OF NATIVE CORONARY ARTERY W/O ANG PCTRS   SNOMED Code(s): 62557566


   





(5) COPD (chronic obstructive pulmonary disease)


Current Visit: Yes   Status: Chronic   Code(s): J44.9 - CHRONIC OBSTRUCTIVE 

PULMONARY DISEASE, UNSPECIFIED   SNOMED Code(s): 56859126


   





(6) Chronic atrial fibrillation


Current Visit: Yes   Status: Chronic   Code(s): I48.2 - CHRONIC ATRIAL 

FIBRILLATION   SNOMED Code(s): 897898958


   





(7) Chronic diastolic CHF (congestive heart failure)


Current Visit: Yes   Status: Chronic   Code(s): I50.32 - CHRONIC DIASTOLIC (

CONGESTIVE) HEART FAILURE   SNOMED Code(s): 392267735


   





(8) Chronic pain syndrome


Current Visit: Yes   Status: Chronic   Code(s): G89.4 - CHRONIC PAIN SYNDROME   

SNOMED Code(s): 716587506


   





(9) Diabetes mellitus type 2 in nonobese


Current Visit: Yes   Status: Chronic   Code(s): E11.9 - TYPE 2 DIABETES 

MELLITUS WITHOUT COMPLICATIONS   SNOMED Code(s): 597714009


   





(10) GERD (gastroesophageal reflux disease)


Current Visit: Yes   Status: Chronic   Code(s): K21.9 - GASTRO-ESOPHAGEAL 

REFLUX DISEASE WITHOUT ESOPHAGITIS   SNOMED Code(s): 643593934


   





(11) History of Coumadin therapy


Current Visit: Yes   Status: Chronic   Code(s): Z92.29 - PERSONAL HISTORY OF 

OTHER DRUG THERAPY   SNOMED Code(s): 683768022


   





(12) History of MRSA infection


Current Visit: No   Status: Resolved   Code(s): Z86.14 - PERSONAL HISTORY OF 

METHICILLIN RESIS STAPH INFECTION   SNOMED Code(s): 071833007


   





(13) History of permanent cardiac pacemaker placement


Current Visit: Yes   Status: Chronic   Code(s): Z95.0 - PRESENCE OF CARDIAC 

PACEMAKER   SNOMED Code(s): 857877834


   





(14) History of sick sinus syndrome


Current Visit: Yes   Status: Chronic   Code(s): Z86.79 - PERSONAL HISTORY OF 

OTHER DISEASES OF THE CIRCULATORY SYSTEM   SNOMED Code(s): 725795870701677


   





(15) History of smoking


Current Visit: No   Status: Resolved   Code(s): Z87.891 - PERSONAL HISTORY OF 

NICOTINE DEPENDENCE   SNOMED Code(s): 16647505805410259


   





(16) Hypertension


Current Visit: Yes   Status: Chronic   Code(s): I10 - ESSENTIAL (PRIMARY) 

HYPERTENSION   SNOMED Code(s): 48188690


   





(17) Lumbar disc disease


Current Visit: Yes   Status: Chronic   Code(s): M51.9 - UNSP THORACIC, 

THORACOLUM AND LUMBOSACR INTVRT DISC DISORDER   SNOMED Code(s): 928124891


   





(18) Non-ST elevated myocardial infarction


Current Visit: Yes   Status: Acute   Code(s): I21.4 - NON-ST ELEVATION (NSTEMI) 

MYOCARDIAL INFARCTION   SNOMED Code(s): 901272039


   





(19) Peripheral vascular disease


Current Visit: Yes   Status: Chronic   Code(s): I73.9 - PERIPHERAL VASCULAR 

DISEASE, UNSPECIFIED   SNOMED Code(s): 592823710


   


Plan: 





1.  Continue aspirin, statin, Plavix, Arixtra, Toprol XL.  Will increase beta 

blocker therapy as tolerated.  Hold Coumadin until all lines and tubes have 

been discontinued.


2.  Wean O2 as tolerated.  Encourage incentive spirometry use 10 times every 

hour while awake.


3.  Encourage continued smoking cessation.


4.  Bronchodilators, steroids, antibiotics per pulmonology.  


5.  Increase activity, out of bed to chair, ambulate as tolerated.  PT/OT/

chronic rehab following.


6.  Will monitor daily labs and x-rays.


7.  Insulin management per Dr. Vang.


8.  Pain management with current medication regimen.


9.  GI/DVT prophylaxis.


10.  Reorient PRN.


11.  Will transfer out of ICU to 6 E. selective care later today.


12.  More recommendations to follow.


Time with Patient: Greater than 30

## 2018-06-15 NOTE — XR
EXAMINATION TYPE: XR chest 1V portable

 

DATE OF EXAM: 6/15/2018

 

COMPARISON: Prior chest x-ray 6/14/2018

 

HISTORY: Status post cardiac surgery, abnormal chest x-ray

 

TECHNIQUE: Single frontal view of the chest is obtained.

 

FINDINGS:  Postop changes are noted. Patchy basilar increased density is present. Chest tube has been
 removed. Central venous catheter has been removed. No evident pneumothorax. Heart size is stable.

 

IMPRESSION:  Basilar atelectasis, there may be some associated effusion. Cardiomegaly. Postop changes
. No evident complication status post chest tube removal.

## 2018-06-16 LAB
ALBUMIN SERPL-MCNC: 3.2 G/DL (ref 3.5–5)
ALP SERPL-CCNC: 54 U/L (ref 38–126)
ALT SERPL-CCNC: 41 U/L (ref 21–72)
ANION GAP SERPL CALC-SCNC: 7 MMOL/L
AST SERPL-CCNC: 53 U/L (ref 17–59)
BUN SERPL-SCNC: 31 MG/DL (ref 9–20)
CALCIUM SPEC-MCNC: 8.1 MG/DL (ref 8.4–10.2)
CHLORIDE SERPL-SCNC: 98 MMOL/L (ref 98–107)
CO2 SERPL-SCNC: 31 MMOL/L (ref 22–30)
ERYTHROCYTE [DISTWIDTH] IN BLOOD BY AUTOMATED COUNT: 3 M/UL (ref 4.3–5.9)
ERYTHROCYTE [DISTWIDTH] IN BLOOD: 19.7 % (ref 11.5–15.5)
GLUCOSE BLD-MCNC: 150 MG/DL (ref 75–99)
GLUCOSE BLD-MCNC: 170 MG/DL (ref 75–99)
GLUCOSE BLD-MCNC: 182 MG/DL (ref 75–99)
GLUCOSE BLD-MCNC: 192 MG/DL (ref 75–99)
GLUCOSE BLD-MCNC: 201 MG/DL (ref 75–99)
GLUCOSE SERPL-MCNC: 119 MG/DL (ref 74–99)
HCT VFR BLD AUTO: 27.1 % (ref 39–53)
HGB BLD-MCNC: 9 GM/DL (ref 13–17.5)
MCH RBC QN AUTO: 29.9 PG (ref 25–35)
MCHC RBC AUTO-ENTMCNC: 33.2 G/DL (ref 31–37)
MCV RBC AUTO: 90.1 FL (ref 80–100)
PLATELET # BLD AUTO: 159 K/UL (ref 150–450)
POTASSIUM SERPL-SCNC: 4.1 MMOL/L (ref 3.5–5.1)
PROT SERPL-MCNC: 5.1 G/DL (ref 6.3–8.2)
SODIUM SERPL-SCNC: 136 MMOL/L (ref 137–145)
WBC # BLD AUTO: 14.7 K/UL (ref 3.8–10.6)

## 2018-06-16 RX ADMIN — DOCUSATE SODIUM AND SENNOSIDES SCH: 50; 8.6 TABLET ORAL at 21:07

## 2018-06-16 RX ADMIN — IPRATROPIUM BROMIDE AND ALBUTEROL SULFATE SCH ML: .5; 3 SOLUTION RESPIRATORY (INHALATION) at 08:01

## 2018-06-16 RX ADMIN — IPRATROPIUM BROMIDE AND ALBUTEROL SULFATE SCH ML: .5; 3 SOLUTION RESPIRATORY (INHALATION) at 16:10

## 2018-06-16 RX ADMIN — BUDESONIDE SCH MG: 1 SUSPENSION RESPIRATORY (INHALATION) at 20:38

## 2018-06-16 RX ADMIN — LEVOFLOXACIN SCH MG: 750 TABLET, FILM COATED ORAL at 08:53

## 2018-06-16 RX ADMIN — CLOPIDOGREL BISULFATE SCH MG: 75 TABLET ORAL at 08:52

## 2018-06-16 RX ADMIN — GABAPENTIN SCH MG: 100 CAPSULE ORAL at 08:51

## 2018-06-16 RX ADMIN — INSULIN ASPART SCH UNIT: 100 INJECTION, SOLUTION INTRAVENOUS; SUBCUTANEOUS at 12:02

## 2018-06-16 RX ADMIN — IPRATROPIUM BROMIDE AND ALBUTEROL SULFATE SCH ML: .5; 3 SOLUTION RESPIRATORY (INHALATION) at 11:31

## 2018-06-16 RX ADMIN — GLIMEPIRIDE SCH MG: 1 TABLET ORAL at 06:54

## 2018-06-16 RX ADMIN — SODIUM CHLORIDE, PRESERVATIVE FREE SCH ML: 5 INJECTION INTRAVENOUS at 21:07

## 2018-06-16 RX ADMIN — TAMSULOSIN HYDROCHLORIDE SCH MG: 0.4 CAPSULE ORAL at 08:52

## 2018-06-16 RX ADMIN — INSULIN ASPART SCH UNIT: 100 INJECTION, SOLUTION INTRAVENOUS; SUBCUTANEOUS at 06:56

## 2018-06-16 RX ADMIN — GABAPENTIN SCH MG: 100 CAPSULE ORAL at 15:55

## 2018-06-16 RX ADMIN — METOPROLOL SUCCINATE SCH MG: 50 TABLET, EXTENDED RELEASE ORAL at 08:52

## 2018-06-16 RX ADMIN — PANTOPRAZOLE SODIUM SCH MG: 40 TABLET, DELAYED RELEASE ORAL at 06:54

## 2018-06-16 RX ADMIN — GABAPENTIN SCH MG: 100 CAPSULE ORAL at 21:07

## 2018-06-16 RX ADMIN — FONDAPARINUX SODIUM SCH MG: 2.5 INJECTION, SOLUTION SUBCUTANEOUS at 08:52

## 2018-06-16 RX ADMIN — SODIUM CHLORIDE, PRESERVATIVE FREE SCH ML: 5 INJECTION INTRAVENOUS at 08:53

## 2018-06-16 RX ADMIN — METOPROLOL SUCCINATE SCH MG: 50 TABLET, EXTENDED RELEASE ORAL at 21:07

## 2018-06-16 RX ADMIN — INSULIN ASPART SCH UNIT: 100 INJECTION, SOLUTION INTRAVENOUS; SUBCUTANEOUS at 21:07

## 2018-06-16 RX ADMIN — IPRATROPIUM BROMIDE AND ALBUTEROL SULFATE SCH ML: .5; 3 SOLUTION RESPIRATORY (INHALATION) at 20:38

## 2018-06-16 RX ADMIN — ACETAMINOPHEN PRN MG: 500 TABLET ORAL at 05:52

## 2018-06-16 RX ADMIN — ASPIRIN 325 MG ORAL TABLET SCH MG: 325 PILL ORAL at 08:52

## 2018-06-16 RX ADMIN — BUDESONIDE SCH MG: 1 SUSPENSION RESPIRATORY (INHALATION) at 08:01

## 2018-06-16 RX ADMIN — ATORVASTATIN CALCIUM SCH MG: 40 TABLET, FILM COATED ORAL at 08:52

## 2018-06-16 RX ADMIN — MUPIROCIN SCH: 20 OINTMENT TOPICAL at 21:07

## 2018-06-16 RX ADMIN — Medication SCH MG: at 21:06

## 2018-06-16 RX ADMIN — INSULIN ASPART SCH UNIT: 100 INJECTION, SOLUTION INTRAVENOUS; SUBCUTANEOUS at 17:10

## 2018-06-16 RX ADMIN — LISINOPRIL SCH MG: 2.5 TABLET ORAL at 12:02

## 2018-06-16 RX ADMIN — MUPIROCIN SCH APPLIC: 20 OINTMENT TOPICAL at 08:51

## 2018-06-16 NOTE — PN
PROGRESS NOTE



A 78-year-old male patient underwent coronary artery bypass grafting.  He is doing well

at this time.  His atrial fibrillation rates are controlled and anticoagulation is

being re-initiated.  Blood pressure is in the normal range.  He denies any chest

discomfort.  He is sitting up comfortably in a chair.  Breath sounds are reduced

bilaterally.  Heart sounds are soft, but irregular.  He does have permanent pacemaker.



IMPRESSION:

1. Coronary artery disease status post coronary artery bypass grafting.

2. Permanent atrial fibrillation with a controlled ventricular response.



SUGGEST:

Continue current medications and discharge planning per CT Surgery.





MMODL / IJN: [id number] / Job#: [id number]

## 2018-06-16 NOTE — P.PN
Subjective


Progress Note Date: 06/16/18


Principal diagnosis: 





Coronary artery disease status post coronary artery bypass grafting





Progress note dated 06/11/2018





78-year-old male with a history of non-ST segment elevation myocardial 

infarction, status post cardiac catheterization revealing severe triple-vessel 

CAD.  In addition, he is quite severe COPD, with an FEV1 in the range of 45% of 

predicted.  He has multiple additional comorbidities including chronic atrial 

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 

systolic heart failure.  The patient surgery is to be done today.  The patient 

is on O2 3 L nasal cannula and has a dextrose half-normal saline IV at KVO.  He 

was on a heparin drip but obviously that was turned off prior to surgery.  

Other than that, the patient is doing reasonably well.  He certainly is at high 

risk.  This has been documented by my partner.





On 06/12/2018 patient seen in follow-up in the intensive care unit.  This is 

postop day 1, status post quadruple coronary artery bypass grafting using the 

LIMA to LAD, reverse SVG to OM1, reverse SVG to OM 2, reverse SVG to the RCA, 

exclusion of the left atrial appendage, and intraoperative SKIP and AP aortic 

scanning.  Patient was extubated in less than 6 hours post OR exit time.  

Patient was extubated at 9:00 last night successfully to a 40% Ventimask, on 

which she remains this morning.  He is awake alert, sitting up in the recliner, 

doing well, denies any acute distress.  Midsternal incision is stable, clean 

dry and intact


One mediastinal and one left pleural chest tube draining small amount of 

serosanguineous drainage.  Maintenance IV includes LR at a rate of 50 ML per 

hour, insulin drip at 2 units per hour, no vasoactive drips at this time, 

clever proximal has been discontinued, as well as nitroglycerin drip.  Toth 

catheter is in place, and the urine output is ranging from 30-75 ML per hour.  

Cardiac index and output is 5.5 and 3.0 respectively.  Dissipate 

discontinuation of the PA catheter this morning.  PA pressures 28/11, with a 

map of 19.  Patient has not required any blood transfusions.  Today's labs were 

reviewed, WBCs 11.2, hemoglobin is 9.0, INR is 1.4, potassium is 5.3, BUN is 33

, creatinine 0.80.  Today's chest x-ray has been reviewed and shows basilar 

atelectasis versus edema.  Lung sounds positive for some crackles over left 

lower lobe anteriorly, diminished on the right.  Patient Solu-Medrol's has been 

switched to oral prednisone. 





On 06/13/2018 patient seen in follow-up in intensive care unit.  This postop 

day 2, status post coronary artery bypass grafting 4, and exclusion of the 

left atrial appendage.  He is awake and alert, remains on 40% Ventimask, today'

s exam patient is a bit more congested, there is scattered rhonchi, and wheezes 

bilaterally.  His chest x-ray has been reviewed and shows new right basilar 

airspace disease that could represent atelectasis or developing pneumonia, 

stable left basilar subsegmental atelectasis, and trace right pleural effusion.

  Incentive spirometry effort is 800 mL today.  He is still chest tube has been 

removed, left pleural chest tube remains in place with serosanguineous output.  

He remains in A. fib with frequent PVCs.  He was started on Arixtra for 

anticoagulation, he was given a dose of IV digoxin for rate control.  Denies 

any chest pain or dyspnea.  Sternal incision is clean dry and intact, stable.  

Woodruff-Pam catheter has been removed.  Maintenance in the IV include LR at a 

rate of 30 ML per hour, no other drips.  Which the oral prednisone back to Solu-

Medrol 40 mg every 8 hours, we'll discontinue Symbicort and put the patient on 

Pulmicort. 





On 06/14/2018 patient seen in follow-up in the intensive care unit.  He is 

confused, and only oriented to person and the president.  Nursing staff reports 

patient is not sleeping well, and was having periods of agitation, during which 

she pulled out and IV.  Safety sitter is at the bedside this morning.  Patient 

is awake, alert, and appears to be in no acute distress.  Denies any dyspnea, 

his pain is reasonable control.  Pox on 5 L per nasal cannula is 99%.  

Hemodynamically stable.  Today's chest x-ray has been reviewed by Dr. Denson, 

and shows small pleural effusion, and basilar atelectasis.  Cardiothoracic 

surgery has been dosing patient's IV Lasix, and the patient will receive 

another 20 mg of Lasix this morning.  Continues on nebulized bronchodilators, 

has a loose productive cough with tan-colored thick sputum, and the sputum 

culture was positive for pseudomonas aeruginosa, and presumptive staph aureus.  

She will be started on oral Cipro, and further adjustment antibiotics will be 

made once the final culture and sensitivity is done on organism #2, for 

presumptive staph aureus.  Patient remains afebrile.  Lung sounds are positive 

for scattered rhonchi, and bibasilar crackles.  Patient's incentive spirometry 

effort today's 1250 ML.  Mediastinal chest tube has been discontinued yesterday

, left pleural chest tube remains in place with serosanguineous output.  

Epicardial wires have been discontinued.  Today lab work has been reviewed, and 

shows no leukocytosis, WBCs 10.2, hemoglobin is 8.7, platelet count is 84, 

serum sodium is 135, potassium is 4.5, chloride is 97, BUN 35, creatinine 0.80.

  Patient is tolerating a regular diet.  Nonoliguric.





On 06/15/2018 patient seen again in follow-up in the intensive care unit.  He 

sitting up in the recliner, currently on 3 L per nasal cannula, denies any 

acute distress, no chest pain, no shortness of breath, no cough or congestion.  

Lung sounds are diminished with a few scattered rhonchi, no significant 

wheezing noted.  Overall improved compared to yesterday's exam.  Midsternal 

incision is clean dry and intact, sternum stable, no edema present.  Hugger is 

in place, and the patient is utilizing his incentive spirometer able to achieve 

6275-1950 mL today.  Today's chest x-ray has been reviewed and still shows 

small pleural effusions, and basilar atelectasis, stable exam.  Patient remains 

in A. fib, with a controlled rate, he had been started on Arixtra for 

anticoagulation, he was given 20 mg of IV Lasix yesterday, and he is in -1558 

mL.  His left pleural chest tube has been discontinued.  No IV fluids or drips 

at this time, . he is tolerating oral diet, no fever or chills, microbiology 

results were reviewed, and the sputum culture was positive for pseudomonas 

aeruginosa, and Staphylococcus aureus, oxacillin sensitive.  Both organisms are 

susceptible to Levaquin, hence we will switch to Cipro to Levaquin. 





The patient is seen again today 06/16/2018 in follow-up on the selective care 

unit.  He is awake and alert in no acute distress.  He is currently sitting up 

in the chair at the bedside.  He denies any worsening shortness of breath, 

cough or congestion.  He is maintaining good O2 saturations up to 100% on 3 L/m 

per nasal cannula.  Currently afebrile.  Hemodynamically stable.  Chest x-ray 

stable.  No new acute processes.  White count 14.7.  Hemoglobin 9.0.  

Creatinine 0.70.  Sputum cultures were positive for pseudomonas aeruginosa and 

Staphylococcus aureus.  He remains on Levaquin.  He is working well with the 

incentive spirometer.  The plan is for transfer to inpatient rehabilitation 

upon discharge.





Objective





- Vital Signs


Vital signs: 


 Vital Signs











Temp  97.6 F   06/16/18 08:40


 


Pulse  91   06/16/18 08:40


 


Resp  18   06/16/18 08:40


 


BP  111/56   06/16/18 08:40


 


Pulse Ox  100   06/16/18 08:40








 Intake & Output











 06/15/18 06/16/18 06/16/18





 18:59 06:59 18:59


 


Intake Total 540  118


 


Output Total 2 220 


 


Balance 538 -220 118


 


Weight  75.3 kg 


 


Intake:   


 


  Oral 540  118


 


Output:   


 


  Drainage  95 


 


    Right Calf  95 


 


  Urine 0 125 


 


  Stool 2  


 


Other:   


 


  Voiding Method Bedside Commode Bedside Commode 





 Urinal Urinal 


 


  # Voids 1 1 


 


  # Bowel Movements  1 








 ABP, PAP, CO, CI - Last Documented











Arterial Blood Pressure        133/59


 


Pulmonary Artery Pressure      25/8


 


Cardiac Output                 5.5


 


Cardiac Index                  3.0

















- Exam





GENERAL EXAM: Alert, pleasant, 70-year-old white male comfortable in no 

apparent distress, confused, oriented times one, to person but in no acute 

distress with a safety sitter at the bedside.  Currently on 3 L per nasal 

cannula


HEAD: Normocephalic/atraumatic.


EYES: Normal reaction of pupils, equal size.  Conjunctiva pink, sclera white.


NOSE: Clear with pink turbinates.


THROAT: No erythema or exudates.


NECK: No masses, no JVD, no thyroid enlargement, no adenopathy.  


CHEST: No chest wall deformity.  Symmetrical expansion.  Midsternal incision is 

clean dry and intact and stable.  Mediastinal, left pleural chest tubes and 

epicardial wires have been all discontinued


LUNGS: Equal air entry bilateral rhonchi and scattered wheezes


CVS: Irregular rate and rhythm, normal S1 and S2, no gallops, no murmurs, no 

rubs


ABDOMEN: Soft, nontender.  No hepatosplenomegaly, normal bowel sounds, no 

guarding or rigidity.


EXTREMITIES: No clubbing, no edema, no cyanosis, 2+ pulses and upper and lower 

extremities.  


MUSCULOSKELETAL: Muscle strength and tone normal.


SPINE: No scoliosis or deformity


SKIN: No rashes


CENTRAL NERVOUS SYSTEM: Alert and oriented -3.  No focal deficits, tone is 

normal in all 4 extremities.


PSYCHIATRIC: Alert and oriented -3.  Mentation is much improved on today's exam





- Labs


CBC & Chem 7: 


 06/16/18 05:55





 06/16/18 05:55


Labs: 


 Abnormal Lab Results - Last 24 Hours (Table)











  06/15/18 06/15/18 06/15/18 Range/Units





  12:16 16:55 21:04 


 


WBC     (3.8-10.6)  k/uL


 


RBC     (4.30-5.90)  m/uL


 


Hgb     (13.0-17.5)  gm/dL


 


Hct     (39.0-53.0)  %


 


RDW     (11.5-15.5)  %


 


Sodium     (137-145)  mmol/L


 


Carbon Dioxide     (22-30)  mmol/L


 


BUN     (9-20)  mg/dL


 


Glucose     (74-99)  mg/dL


 


POC Glucose (mg/dL)  193 H  187 H  128 H  (75-99)  mg/dL


 


Calcium     (8.4-10.2)  mg/dL


 


Total Protein     (6.3-8.2)  g/dL


 


Albumin     (3.5-5.0)  g/dL














  06/16/18 06/16/18 06/16/18 Range/Units





  02:05 05:53 05:55 


 


WBC    14.7 H  (3.8-10.6)  k/uL


 


RBC    3.00 L  (4.30-5.90)  m/uL


 


Hgb    9.0 L  (13.0-17.5)  gm/dL


 


Hct    27.1 L  (39.0-53.0)  %


 


RDW    19.7 H  (11.5-15.5)  %


 


Sodium     (137-145)  mmol/L


 


Carbon Dioxide     (22-30)  mmol/L


 


BUN     (9-20)  mg/dL


 


Glucose     (74-99)  mg/dL


 


POC Glucose (mg/dL)  201 H  150 H   (75-99)  mg/dL


 


Calcium     (8.4-10.2)  mg/dL


 


Total Protein     (6.3-8.2)  g/dL


 


Albumin     (3.5-5.0)  g/dL














  06/16/18 Range/Units





  05:55 


 


WBC   (3.8-10.6)  k/uL


 


RBC   (4.30-5.90)  m/uL


 


Hgb   (13.0-17.5)  gm/dL


 


Hct   (39.0-53.0)  %


 


RDW   (11.5-15.5)  %


 


Sodium  136 L  (137-145)  mmol/L


 


Carbon Dioxide  31 H  (22-30)  mmol/L


 


BUN  31 H  (9-20)  mg/dL


 


Glucose  119 H  (74-99)  mg/dL


 


POC Glucose (mg/dL)   (75-99)  mg/dL


 


Calcium  8.1 L  (8.4-10.2)  mg/dL


 


Total Protein  5.1 L  (6.3-8.2)  g/dL


 


Albumin  3.2 L  (3.5-5.0)  g/dL














Assessment and Plan


Assessment: 





Assessment:





Acute non-ST segment elevation myocardial infarction, secondary to severe triple

-vessel CAD, status post quadruple coronary artery bypass grafting with LIMA to 

LAD, reverse SVG to OM1, reverse SVG to OM 2, and reverse SVG to RCA, and 

exclusion of the left atrial appendage with AtriCure clip, 





Severe COPD, stage III disease, with an FEV1 that is 45% of predicted





Chronic hypoxemic respiratory failure





Sputum cultures positive for pseudomonas aeruginosa and oxacillin sensitive 

Staphylococcus aureus from's 06/11/2018





Chronic atrial fibrillation





Type 2 diabetes mellitus





Diabetic neuropathy





PVOD





Osteomyelitis





History of hypertension





History of systolic heart failure.





Plan:





The patient was seen and evaluated by Dr. Denson.  Chest x-ray and labs were 

reviewed.  We'll continue with his current treatment plan.  Continue to 

encourage increased use of the incentive spirometer and cough and deep 

breathing exercises.  Increase his activity as tolerated.  Continue to titrate 

down the FiO2 as tolerated.  We'll continue to follow.





I, the cosigning physician, performed a history & physical examination of the 

patient. Lungs sounds faint crackles in the posterior bases. Maintaining good 

O2 saturations in the 90s on 3 L/m per nasal cannula.  I discussed the 

assessment and plan of care with my nurse practitioner, Li Wong. I attest to 

the above note as dictated by her.

## 2018-06-16 NOTE — XR
EXAMINATION: XR chest 2V

DATE AND TIME:  6/16/2018 6:42 AM

 

ORDERING PROVIDER: Katharine Low

 

CLINICAL INDICATION: post cardiac surgery

 

TECHNIQUE: PA and lateral

 

COMPARISON: AP portable upright 6/15/2018 at 6:36 AM

 

DESCRIPTION: 

Cardiac pacemaker, sternal sutures and mediastinal clips and EKG leads.

 

Marked hyperinflation redemonstrated, particularly on the right. No definite pneumothorax.

 

Mildly enlarged cardiac silhouette redemonstrated.

 

IMPRESSION: 

STABLE APPEARANCE; NO NEW PROCESS.

## 2018-06-16 NOTE — P.PN
Subjective


Progress Note Date: 06/16/18


Principal diagnosis: 





Symptomatic triple-vessel coronary artery disease with left main disease, non-

ST elevation myocardial infarction, mild to moderate left ventricular 

dysfunction.  Chronic persistent atrial fibrillation on home Coumadin therapy.  

Sick sinus syndrome, status post permanent pacemaker insertion.  Chronic 

systolic heart failure with EF 40-45% and diastolic dysfunction.  Hypertension.

  Hyperlipidemia. Bilateral carotid stenosis, 50-79% bilaterally, Previous 

tobacco dependence.  Severe chronic obstructive pulmonary disease with home 

oxygen with preoperative FEV1 45% of predicted.  Arthritis.  Benign prostatic 

hypertrophy.  Gastroesophageal reflux disease.  Recurrent extremities 

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  Lumbar disc 

disease with chronic pain syndrome.  History of deep vein thrombosis of his 

left leg.  Type 2 diabetes mellitus with preoperative hemoglobin A1c 5.9%.  

Peripheral vascular disease.  Remote history of pneumonia.  Family history of 

early onset coronary artery disease with brother being diagnosed with 

myocardial infarction at age 46.  Preoperative nasal swab positive for MSSA.  

Preoperative sputum culture positive for pseudomonas and MSSA.





POD #5 urgent quadruple coronary artery bypass grafting using the left internal 

mammary artery to the left anterior descending artery, reverse saphenous vein 

graft from the aorta to the first obtuse marginal artery, reverse saphenous 

vein graft from the aorta to the second obtuse marginal artery, reverse 

saphenous vein graft from the aorta to the right coronary artery.  Exclusion of 

the left atrial appendage using a 40 mm AtriClip.  Endoscopic harvesting of the 

right greater saphenous vein.  Intraoperative transesophageal echocardiogram 

and epi-aortic scanning.  Intraoperative graft flow measurements using the 

Medistim system.





Postoperative normocytic, normochromic anemia, an expected outcome of surgery 

related to hemodilution and cardiopulmonary bypass pump.





Heparin-induced thrombocytopenia, an unexpected but potential outcome of IV 

heparin use.





Patient's currently sitting in chair in no acute distress.  Denies pain, 

appears slightly short of breath.  Per nursing patient does have a rough night 

with his breathing, appears stable right now.  He was transferred out of ICU to 

[address] [place] [address] care yesterday.  Less confusion present.





Objective





- Vital Signs


Vital signs: 


 Vital Signs











Temp  97.5 F L  06/16/18 04:00


 


Pulse  88   06/16/18 04:00


 


Resp  20   06/16/18 04:00


 


BP  120/70   06/16/18 04:00


 


Pulse Ox  99   06/16/18 04:00








 Intake & Output











 06/15/18 06/16/18 06/16/18





 18:59 06:59 18:59


 


Intake Total 540  


 


Output Total 2 220 


 


Balance 538 -220 


 


Weight  75.3 kg 


 


Intake:   


 


  Oral 540  


 


Output:   


 


  Drainage  95 


 


    Right Calf  95 


 


  Urine 0 125 


 


  Stool 2  


 


Other:   


 


  Voiding Method Bedside Commode Bedside Commode 





 Urinal Urinal 


 


  # Voids 1 1 


 


  # Bowel Movements  1 








 ABP, PAP, CO, CI - Last Documented











Arterial Blood Pressure        133/59


 


Pulmonary Artery Pressure      25/8


 


Cardiac Output                 5.5


 


Cardiac Index                  3.0

















- Constitutional


General appearance: Present: cooperative, no acute distress





- Respiratory


Details: 





Lungs sounds diminished and coarse bilaterally with faint expiratory wheezes 

present.  Respirations even, slightly short of breath.  Currently on 3 L nasal 

cannula with oxygen saturation 99%.  Able to achieve 750-1000 mL on his 

incentive spirometry.  Wet cough.








- Cardiovascular


Details: 





S1, S2 present.  Irregular rate and rhythm, atrial fibrillation on telemetry.   

Sternum stable.  Palpable peripheral pulses bilaterally.  No edema present.  No 

calf pain or tenderness noted.   Heart hugger in place with patient 

demonstrating appropriate use.  Antiembolism stockings, SCDs present.








- Gastrointestinal


Gastrointestinal Comment(s): 





Abdomen soft, nontender, nondistended.  Active bowel sounds 4 quadrants.  

Tolerating diet.  Positive bowel movement.








- Genitourinary


Genitourinary Comment(s): 





Continues to void clear, yellow urine.





- Integumentary


Integumentary Comment(s): 





Skin is warm and dry.  Anterior chest incision well approximated and covered 

with dry intact dressing.  Right lower extremity EVH site well approximated, JOCE 

drain present with 30 mL serosanguineous drainage in the last 8 hours.








- Neurologic


Neurologic: Present: CNII-XII intact





- Musculoskeletal


Musculoskeletal: Present: generalized weakness, strength equal bilaterally





- Psychiatric


Psychiatric: Present: A&O x's 3, appropriate affect, intact judgment & insight





- Allied health notes


Allied health notes reviewed: nursing





- Labs


CBC & Chem 7: 


 06/16/18 05:55





 06/16/18 05:55


Labs: 


 Abnormal Lab Results - Last 24 Hours (Table)











  06/15/18 06/15/18 06/15/18 Range/Units





  12:16 16:55 21:04 


 


WBC     (3.8-10.6)  k/uL


 


RBC     (4.30-5.90)  m/uL


 


Hgb     (13.0-17.5)  gm/dL


 


Hct     (39.0-53.0)  %


 


RDW     (11.5-15.5)  %


 


Sodium     (137-145)  mmol/L


 


Carbon Dioxide     (22-30)  mmol/L


 


BUN     (9-20)  mg/dL


 


Glucose     (74-99)  mg/dL


 


POC Glucose (mg/dL)  193 H  187 H  128 H  (75-99)  mg/dL


 


Calcium     (8.4-10.2)  mg/dL


 


Total Protein     (6.3-8.2)  g/dL


 


Albumin     (3.5-5.0)  g/dL














  06/16/18 06/16/18 06/16/18 Range/Units





  02:05 05:53 05:55 


 


WBC    14.7 H  (3.8-10.6)  k/uL


 


RBC    3.00 L  (4.30-5.90)  m/uL


 


Hgb    9.0 L  (13.0-17.5)  gm/dL


 


Hct    27.1 L  (39.0-53.0)  %


 


RDW    19.7 H  (11.5-15.5)  %


 


Sodium     (137-145)  mmol/L


 


Carbon Dioxide     (22-30)  mmol/L


 


BUN     (9-20)  mg/dL


 


Glucose     (74-99)  mg/dL


 


POC Glucose (mg/dL)  201 H  150 H   (75-99)  mg/dL


 


Calcium     (8.4-10.2)  mg/dL


 


Total Protein     (6.3-8.2)  g/dL


 


Albumin     (3.5-5.0)  g/dL














  06/16/18 Range/Units





  05:55 


 


WBC   (3.8-10.6)  k/uL


 


RBC   (4.30-5.90)  m/uL


 


Hgb   (13.0-17.5)  gm/dL


 


Hct   (39.0-53.0)  %


 


RDW   (11.5-15.5)  %


 


Sodium  136 L  (137-145)  mmol/L


 


Carbon Dioxide  31 H  (22-30)  mmol/L


 


BUN  31 H  (9-20)  mg/dL


 


Glucose  119 H  (74-99)  mg/dL


 


POC Glucose (mg/dL)   (75-99)  mg/dL


 


Calcium  8.1 L  (8.4-10.2)  mg/dL


 


Total Protein  5.1 L  (6.3-8.2)  g/dL


 


Albumin  3.2 L  (3.5-5.0)  g/dL














- Imaging and Cardiology


Chest x-ray: image reviewed





Assessment and Plan


(1) Family history of early CAD


Current Visit: Yes   Status: Chronic   Code(s): Z82.49 - FAMILY HX OF ISCHEM 

HEART DIS AND OTH DIS OF THE CIRC SYS   SNOMED Code(s): 081082439


   





(2) On home oxygen therapy


Current Visit: Yes   Status: Chronic   Code(s): Z99.81 - DEPENDENCE ON 

SUPPLEMENTAL OXYGEN   SNOMED Code(s): 912181096178


   





(3) BPH (benign prostatic hyperplasia)


Current Visit: Yes   Status: Chronic   Code(s): N40.0 - BENIGN PROSTATIC 

HYPERPLASIA WITHOUT LOWER URINRY TRACT SYMP   SNOMED Code(s): 027605359


   





(4) CAD (coronary artery disease)


Current Visit: Yes   Status: Chronic   Code(s): I25.10 - ATHSCL HEART DISEASE 

OF NATIVE CORONARY ARTERY W/O ANG PCTRS   SNOMED Code(s): 88406698


   





(5) COPD (chronic obstructive pulmonary disease)


Current Visit: Yes   Status: Chronic   Code(s): J44.9 - CHRONIC OBSTRUCTIVE 

PULMONARY DISEASE, UNSPECIFIED   SNOMED Code(s): 39740010


   





(6) Chronic atrial fibrillation


Current Visit: Yes   Status: Chronic   Code(s): I48.2 - CHRONIC ATRIAL 

FIBRILLATION   SNOMED Code(s): 166277821


   





(7) Chronic diastolic CHF (congestive heart failure)


Current Visit: Yes   Status: Chronic   Code(s): I50.32 - CHRONIC DIASTOLIC (

CONGESTIVE) HEART FAILURE   SNOMED Code(s): 542548816


   





(8) Chronic pain syndrome


Current Visit: Yes   Status: Chronic   Code(s): G89.4 - CHRONIC PAIN SYNDROME   

SNOMED Code(s): 093816193


   





(9) Diabetes mellitus type 2 in nonobese


Current Visit: Yes   Status: Chronic   Code(s): E11.9 - TYPE 2 DIABETES 

MELLITUS WITHOUT COMPLICATIONS   SNOMED Code(s): 074655174


   





(10) GERD (gastroesophageal reflux disease)


Current Visit: Yes   Status: Chronic   Code(s): K21.9 - GASTRO-ESOPHAGEAL 

REFLUX DISEASE WITHOUT ESOPHAGITIS   SNOMED Code(s): 376130065


   





(11) History of Coumadin therapy


Current Visit: Yes   Status: Chronic   Code(s): Z92.29 - PERSONAL HISTORY OF 

OTHER DRUG THERAPY   SNOMED Code(s): 846656459


   





(12) History of MRSA infection


Current Visit: No   Status: Resolved   Code(s): Z86.14 - PERSONAL HISTORY OF 

METHICILLIN RESIS STAPH INFECTION   SNOMED Code(s): 932499868


   





(13) History of permanent cardiac pacemaker placement


Current Visit: Yes   Status: Chronic   Code(s): Z95.0 - PRESENCE OF CARDIAC 

PACEMAKER   SNOMED Code(s): 133206227


   





(14) History of sick sinus syndrome


Current Visit: Yes   Status: Chronic   Code(s): Z86.79 - PERSONAL HISTORY OF 

OTHER DISEASES OF THE CIRCULATORY SYSTEM   SNOMED Code(s): 048281552504770


   





(15) History of smoking


Current Visit: No   Status: Resolved   Code(s): Z87.891 - PERSONAL HISTORY OF 

NICOTINE DEPENDENCE   SNOMED Code(s): 80517996013557009


   





(16) Hypertension


Current Visit: Yes   Status: Chronic   Code(s): I10 - ESSENTIAL (PRIMARY) 

HYPERTENSION   SNOMED Code(s): 12780952


   





(17) Lumbar disc disease


Current Visit: Yes   Status: Chronic   Code(s): M51.9 - UNSP THORACIC, 

THORACOLUM AND LUMBOSACR INTVRT DISC DISORDER   SNOMED Code(s): 875359627


   





(18) Non-ST elevated myocardial infarction


Current Visit: Yes   Status: Acute   Code(s): I21.4 - NON-ST ELEVATION (NSTEMI) 

MYOCARDIAL INFARCTION   SNOMED Code(s): 225829852


   





(19) Peripheral vascular disease


Current Visit: Yes   Status: Chronic   Code(s): I73.9 - PERIPHERAL VASCULAR 

DISEASE, UNSPECIFIED   SNOMED Code(s): 176338767


   


Plan: 





1.  Continue aspirin, statin, Plavix, Arixtra, Toprol XL.  Will increase beta 

blocker therapy as tolerated.  Will restart Coumadin today.Will restart low 

dose ACE-I today.


2.  Wean O2 as tolerated.  Encourage incentive spirometry use 10 times every 

hour while awake.


3.  Encourage continued smoking cessation.


4.  Bronchodilators, steroids, antibiotics per pulmonology.  


5.  Increase activity, out of bed to chair, ambulate as tolerated.  PT/OT/

chronic rehab following.


6.  Will monitor daily labs and x-rays.


7.  Insulin management per Dr. Vang.


8.  Pain management with current medication regimen.


9.  GI/DVT prophylaxis.


10.  Reorient PRN.


11.  Will discontinue JOCE drain today


12.  Discharge planning in progress.  Possible discharge to [facility] on Monday.


13.  More recommendations to follow.


Time with Patient: Greater than 30

## 2018-06-16 NOTE — P.PN
Progress Note - Text





The patient is a 78-year-old gentleman who has undergone quadruple coronary 

bypass grafting.  He presently has been moved to the cardiac floor and is 

sitting in a chair at the side of the bed.  He is alert and oriented and 

pleasant.  States he is feeling better.  Pain seems to be controlled and he 

does not appear to be in any respiratory distress.





Vital signs reveal temperature of 97.6 with a pulse of 91 and slightly 

irregular.  Respiratory rate of 18 and blood pressure 111/56.  He is 100% 

saturated on 3 L nasal cannula.


Lungs generally are clear.  Heart tones slightly irregular but rate controlled.


Abdomen nontender.


Compression stockings in place.  No marked edema noted.


He is alert and oriented.  Cranial nerves intact.  No focal weakness noted.





Laboratory


White count is 14.7 with a hemoglobin 9.0 and a platelet count 159.


Sodium 136 with potassium 4.1.  BUN of 31 with creatinine 0.7.  Glucose 119.  

Albumin 3.2.





Chest x-ray shows a stable appearance with no new process.





Impressions and plans


Overall patient appears to be doing well post coronary artery bypass surgery.  

Blood sugars are generally running better and he is back on an oral 

hypoglycemic agent.  Continue to monitor.  Continue activity as per surgery.  

Patient appears to be anticipating possible discharge to rehab early next week.





Dr. Juan Cook on-call for me over the weekend if any concerns should 

develop.

## 2018-06-17 LAB
ALBUMIN SERPL-MCNC: 3.1 G/DL (ref 3.5–5)
ALP SERPL-CCNC: 51 U/L (ref 38–126)
ALT SERPL-CCNC: 40 U/L (ref 21–72)
ANION GAP SERPL CALC-SCNC: 6 MMOL/L
AST SERPL-CCNC: 36 U/L (ref 17–59)
BUN SERPL-SCNC: 22 MG/DL (ref 9–20)
CALCIUM SPEC-MCNC: 8.1 MG/DL (ref 8.4–10.2)
CHLORIDE SERPL-SCNC: 97 MMOL/L (ref 98–107)
CO2 SERPL-SCNC: 32 MMOL/L (ref 22–30)
ERYTHROCYTE [DISTWIDTH] IN BLOOD BY AUTOMATED COUNT: 2.97 M/UL (ref 4.3–5.9)
ERYTHROCYTE [DISTWIDTH] IN BLOOD: 18.4 % (ref 11.5–15.5)
GLUCOSE BLD-MCNC: 112 MG/DL (ref 75–99)
GLUCOSE BLD-MCNC: 116 MG/DL (ref 75–99)
GLUCOSE BLD-MCNC: 176 MG/DL (ref 75–99)
GLUCOSE BLD-MCNC: 212 MG/DL (ref 75–99)
GLUCOSE BLD-MCNC: 97 MG/DL (ref 75–99)
GLUCOSE SERPL-MCNC: 99 MG/DL (ref 74–99)
HCT VFR BLD AUTO: 26.8 % (ref 39–53)
HGB BLD-MCNC: 9 GM/DL (ref 13–17.5)
INR PPP: 1.1 (ref ?–1.2)
MCH RBC QN AUTO: 30.4 PG (ref 25–35)
MCHC RBC AUTO-ENTMCNC: 33.6 G/DL (ref 31–37)
MCV RBC AUTO: 90.2 FL (ref 80–100)
PLATELET # BLD AUTO: 186 K/UL (ref 150–450)
POTASSIUM SERPL-SCNC: 4.2 MMOL/L (ref 3.5–5.1)
PROT SERPL-MCNC: 5.1 G/DL (ref 6.3–8.2)
PT BLD: 11 SEC (ref 9–12)
SODIUM SERPL-SCNC: 135 MMOL/L (ref 137–145)
WBC # BLD AUTO: 13.6 K/UL (ref 3.8–10.6)

## 2018-06-17 RX ADMIN — METOPROLOL SUCCINATE SCH MG: 50 TABLET, EXTENDED RELEASE ORAL at 20:17

## 2018-06-17 RX ADMIN — IPRATROPIUM BROMIDE AND ALBUTEROL SULFATE PRN ML: .5; 3 SOLUTION RESPIRATORY (INHALATION) at 07:35

## 2018-06-17 RX ADMIN — INSULIN ASPART SCH: 100 INJECTION, SOLUTION INTRAVENOUS; SUBCUTANEOUS at 11:36

## 2018-06-17 RX ADMIN — CLOPIDOGREL BISULFATE SCH MG: 75 TABLET ORAL at 09:09

## 2018-06-17 RX ADMIN — IPRATROPIUM BROMIDE AND ALBUTEROL SULFATE SCH ML: .5; 3 SOLUTION RESPIRATORY (INHALATION) at 05:44

## 2018-06-17 RX ADMIN — GABAPENTIN SCH MG: 100 CAPSULE ORAL at 20:17

## 2018-06-17 RX ADMIN — MUPIROCIN SCH APPLIC: 20 OINTMENT TOPICAL at 09:08

## 2018-06-17 RX ADMIN — GABAPENTIN SCH MG: 100 CAPSULE ORAL at 09:08

## 2018-06-17 RX ADMIN — INSULIN ASPART SCH: 100 INJECTION, SOLUTION INTRAVENOUS; SUBCUTANEOUS at 06:10

## 2018-06-17 RX ADMIN — LISINOPRIL SCH MG: 2.5 TABLET ORAL at 11:28

## 2018-06-17 RX ADMIN — ATORVASTATIN CALCIUM SCH MG: 40 TABLET, FILM COATED ORAL at 09:09

## 2018-06-17 RX ADMIN — BUDESONIDE AND FORMOTEROL FUMARATE DIHYDRATE SCH PUFF: 160; 4.5 AEROSOL RESPIRATORY (INHALATION) at 20:31

## 2018-06-17 RX ADMIN — IPRATROPIUM BROMIDE AND ALBUTEROL SULFATE SCH ML: .5; 3 SOLUTION RESPIRATORY (INHALATION) at 20:31

## 2018-06-17 RX ADMIN — GABAPENTIN SCH MG: 100 CAPSULE ORAL at 15:13

## 2018-06-17 RX ADMIN — SODIUM CHLORIDE, PRESERVATIVE FREE SCH ML: 5 INJECTION INTRAVENOUS at 09:10

## 2018-06-17 RX ADMIN — TAMSULOSIN HYDROCHLORIDE SCH MG: 0.4 CAPSULE ORAL at 07:37

## 2018-06-17 RX ADMIN — ASPIRIN 325 MG ORAL TABLET SCH MG: 325 PILL ORAL at 09:08

## 2018-06-17 RX ADMIN — LEVOFLOXACIN SCH MG: 750 TABLET, FILM COATED ORAL at 09:08

## 2018-06-17 RX ADMIN — PANTOPRAZOLE SODIUM SCH MG: 40 TABLET, DELAYED RELEASE ORAL at 06:46

## 2018-06-17 RX ADMIN — IPRATROPIUM BROMIDE AND ALBUTEROL SULFATE SCH ML: .5; 3 SOLUTION RESPIRATORY (INHALATION) at 11:26

## 2018-06-17 RX ADMIN — INSULIN ASPART SCH UNIT: 100 INJECTION, SOLUTION INTRAVENOUS; SUBCUTANEOUS at 21:33

## 2018-06-17 RX ADMIN — Medication SCH MG: at 20:17

## 2018-06-17 RX ADMIN — GLIMEPIRIDE SCH MG: 1 TABLET ORAL at 06:46

## 2018-06-17 RX ADMIN — IPRATROPIUM BROMIDE AND ALBUTEROL SULFATE SCH ML: .5; 3 SOLUTION RESPIRATORY (INHALATION) at 15:26

## 2018-06-17 RX ADMIN — METOPROLOL SUCCINATE SCH MG: 50 TABLET, EXTENDED RELEASE ORAL at 09:09

## 2018-06-17 RX ADMIN — FONDAPARINUX SODIUM SCH MG: 2.5 INJECTION, SOLUTION SUBCUTANEOUS at 09:09

## 2018-06-17 RX ADMIN — INSULIN ASPART SCH UNIT: 100 INJECTION, SOLUTION INTRAVENOUS; SUBCUTANEOUS at 17:23

## 2018-06-17 RX ADMIN — BUDESONIDE SCH MG: 1 SUSPENSION RESPIRATORY (INHALATION) at 05:43

## 2018-06-17 RX ADMIN — ACETAMINOPHEN PRN MG: 500 TABLET ORAL at 11:28

## 2018-06-17 RX ADMIN — SODIUM CHLORIDE, PRESERVATIVE FREE SCH ML: 5 INJECTION INTRAVENOUS at 20:17

## 2018-06-17 RX ADMIN — MUPIROCIN SCH APPLIC: 20 OINTMENT TOPICAL at 20:18

## 2018-06-17 RX ADMIN — IPRATROPIUM BROMIDE AND ALBUTEROL SULFATE PRN ML: .5; 3 SOLUTION RESPIRATORY (INHALATION) at 05:43

## 2018-06-17 NOTE — PN
PROGRESS NOTE



Erlin Boyle is a 78-year-old male patient. Mr. Boyle is doing well from a chronic

standpoint.  He denies any chest discomfort.  No breathing trouble.  He looks very

comfortable.  His vitals are stable.  He is afebrile 96.2 degrees Fahrenheit, pulse

rate is in the 80s, blood pressure is 96/55 mmHg.  Head and neck examination normal.

Heart sounds are S1, S2 soft.  Breath sounds are reduced bilaterally especially at the

left base.



IMPRESSION:

1. Coronary artery disease status post coronary bypass grafting.

2. Rate controlled atrial fibrillation, permanent.

3. History of permanent pacing.



SUGGEST:

Continue on current medications and from a cardiac standpoint he may be discharged

whenever is okay with CT surgery.





MMODL / IJN: [id number] / Job#: [id number]

## 2018-06-17 NOTE — XR
EXAMINATION TYPE: XR chest 2V

 

DATE OF EXAM: 6/17/2018

 

COMPARISON: 6/16/2018

 

TECHNIQUE: PA and lateral views submitted.

 

HISTORY: Post cardiac surgery

 

FINDINGS:

Bilateral consolidation and pleural effusion. Underlying COPD noted. Postoperative change and cardiom
egaly stable. Cardiac device noted. Shoulders.

 

IMPRESSION:

1. COPD with bilateral infiltrate and small effusion stable.

## 2018-06-17 NOTE — P.PN
Progress Note - Text





The patient is a 78-year-old gentleman who is post quadruple coronary artery 

bypass grafting.  He continues to be on the cardiac floor and is sitting in the 

chair in his room.  He appears alert and oriented.  In no acute distress.  

States he was somewhat short of breath this morning but feels better now.  

Patient does have history of severe COPD and previous smoking history.





Temperature is 96.2 with a pulse of 98 and respirations 20.  Blood pressure was 

96/55 and he was 95% saturated on 3 L.


Lungs are generally clear although diminished at bases.


Heart tones were irregular but rate is controlled.


Abdomen nontender.


No unusual edema.


No focal neurological deficits.





Laboratory


White count 13.6 with a hemoglobin 9 and a platelet count of 186.  INR is 1.1.


Sodium 135 with a potassium 4.2.  BUN of 22 with a creatinine of 0.7 giving him 

a GFR greater than 90.  Blood sugar 112.





Chest x-ray shows evidence of COPD and bilateral infiltrates with small pleural 

effusion/stable





Impressions and plans


Patient overall appears to be doing well post surgery.  Continue to advance 

activity as per surgery.  Anticipating likely transfer to extended care rehab 

over the next 24-48 hours.  Patient is aware and would like to go to [facility].

## 2018-06-17 NOTE — P.PN
Subjective


Progress Note Date: 06/17/18


Principal diagnosis: 





Symptomatic triple-vessel coronary artery disease with left main disease, non-

ST elevation myocardial infarction, mild to moderate left ventricular 

dysfunction.  Chronic persistent atrial fibrillation on home Coumadin therapy.  

Sick sinus syndrome, status post permanent pacemaker insertion.  Chronic 

systolic heart failure with EF 40-45% and diastolic dysfunction.  Hypertension.

  Hyperlipidemia. Bilateral carotid stenosis, 50-79% bilaterally, Previous 

tobacco dependence.  Severe chronic obstructive pulmonary disease with home 

oxygen with preoperative FEV1 45% of predicted.  Arthritis.  Benign prostatic 

hypertrophy.  Gastroesophageal reflux disease.  Recurrent extremities 

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  Lumbar disc 

disease with chronic pain syndrome.  History of deep vein thrombosis of his 

left leg.  Type 2 diabetes mellitus with preoperative hemoglobin A1c 5.9%.  

Peripheral vascular disease.  Remote history of pneumonia.  Family history of 

early onset coronary artery disease with brother being diagnosed with 

myocardial infarction at age 46.  Preoperative nasal swab positive for MSSA.  

Preoperative sputum culture positive for pseudomonas and MSSA.





POD #6 urgent quadruple coronary artery bypass grafting using the left internal 

mammary artery to the left anterior descending artery, reverse saphenous vein 

graft from the aorta to the first obtuse marginal artery, reverse saphenous 

vein graft from the aorta to the second obtuse marginal artery, reverse 

saphenous vein graft from the aorta to the right coronary artery.  Exclusion of 

the left atrial appendage using a 40 mm AtriClip.  Endoscopic harvesting of the 

right greater saphenous vein.  Intraoperative transesophageal echocardiogram 

and epi-aortic scanning.  Intraoperative graft flow measurements using the 

Medistim system.





Postoperative normocytic, normochromic anemia, an expected outcome of surgery 

related to hemodilution and cardiopulmonary bypass pump.





Heparin-induced thrombocytopenia, an unexpected but potential outcome of IV 

heparin use.





Patient's currently sitting in chair in no acute distress.  Denies pain, does 

complain of mild shortness of breath.  The patient states he slept well last 

night, no new complaints.





Objective





- Vital Signs


Vital signs: 


 Vital Signs











Temp  97 F L  06/16/18 23:47


 


Pulse  98   06/17/18 08:03


 


Resp  22   06/17/18 04:00


 


BP  106/56   06/17/18 04:00


 


Pulse Ox  100   06/17/18 07:35








 Intake & Output











 06/16/18 06/17/18 06/17/18





 18:59 06:59 18:59


 


Intake Total 358 360 


 


Balance 358 360 


 


Intake:   


 


  Oral 358 360 


 


Other:   


 


  # Voids 1 2 








 ABP, PAP, CO, CI - Last Documented











Arterial Blood Pressure        133/59


 


Pulmonary Artery Pressure      25/8


 


Cardiac Output                 5.5


 


Cardiac Index                  3.0

















- Constitutional


General appearance: Present: cooperative, no acute distress





- Respiratory


Details: 





Lungs sounds diminished and coarse bilaterally with faint expiratory wheezes 

present.  Respirations even, nonlabored.  Currently on 3 L nasal cannula with 

oxygen saturation 100%.  Able to achieve 8749-7156 mL on his incentive 

spirometry.  Productive cough with yellow sputum.








- Cardiovascular


Details: 





S1, S2 present.  Irregular rate and rhythm, atrial fibrillation on telemetry.   

Sternum stable.  Palpable peripheral pulses bilaterally.  Trace lower extremity 

edema present.  No calf pain or tenderness noted.   Heart hugger in place with 

patient demonstrating appropriate use.  Antiembolism stockings, SCDs present.








- Gastrointestinal


Gastrointestinal Comment(s): 





Abdomen soft, nontender, nondistended.  Active bowel sounds 4 quadrants.  

Tolerating diet.  Positive bowel movement.








- Genitourinary


Genitourinary Comment(s): 





Continues to void clear, yellow urine.





- Integumentary


Integumentary Comment(s): 





Skin is warm and dry.  Anterior chest incision well approximated and covered 

with dry intact dressing.  Right lower extremity EVH site well approximated 

with some drainage overnight per nursing.





- Neurologic


Neurologic: Present: CNII-XII intact





- Musculoskeletal


Musculoskeletal: Present: generalized weakness, strength equal bilaterally





- Psychiatric


Psychiatric: Present: A&O x's 3, appropriate affect, intact judgment & insight





- Allied health notes


Allied health notes reviewed: nursing





- Labs


CBC & Chem 7: 


 06/17/18 05:49





 06/17/18 05:49


Labs: 


 Abnormal Lab Results - Last 24 Hours (Table)











  06/16/18 06/16/18 06/16/18 Range/Units





  11:41 17:02 20:43 


 


WBC     (3.8-10.6)  k/uL


 


RBC     (4.30-5.90)  m/uL


 


Hgb     (13.0-17.5)  gm/dL


 


Hct     (39.0-53.0)  %


 


RDW     (11.5-15.5)  %


 


Sodium     (137-145)  mmol/L


 


Chloride     ()  mmol/L


 


Carbon Dioxide     (22-30)  mmol/L


 


BUN     (9-20)  mg/dL


 


POC Glucose (mg/dL)  192 H  182 H  170 H  (75-99)  mg/dL


 


Calcium     (8.4-10.2)  mg/dL


 


Total Protein     (6.3-8.2)  g/dL


 


Albumin     (3.5-5.0)  g/dL














  06/17/18 06/17/18 06/17/18 Range/Units





  05:49 05:49 05:53 


 


WBC  13.6 H    (3.8-10.6)  k/uL


 


RBC  2.97 L    (4.30-5.90)  m/uL


 


Hgb  9.0 L    (13.0-17.5)  gm/dL


 


Hct  26.8 L    (39.0-53.0)  %


 


RDW  18.4 H    (11.5-15.5)  %


 


Sodium   135 L   (137-145)  mmol/L


 


Chloride   97 L   ()  mmol/L


 


Carbon Dioxide   32 H   (22-30)  mmol/L


 


BUN   22 H   (9-20)  mg/dL


 


POC Glucose (mg/dL)    112 H  (75-99)  mg/dL


 


Calcium   8.1 L   (8.4-10.2)  mg/dL


 


Total Protein   5.1 L   (6.3-8.2)  g/dL


 


Albumin   3.1 L   (3.5-5.0)  g/dL














- Imaging and Cardiology


Chest x-ray: report reviewed, image reviewed





Assessment and Plan


(1) Family history of early CAD


Current Visit: Yes   Status: Chronic   Code(s): Z82.49 - FAMILY HX OF ISCHEM 

HEART DIS AND OTH DIS OF THE CIRC S   SNOMED Code(s): 278141578


   





(2) On home oxygen therapy


Current Visit: Yes   Status: Chronic   Code(s): Z99.81 - DEPENDENCE ON 

SUPPLEMENTAL OXYGEN   SNOMED Code(s): 269964100560


   





(3) BPH (benign prostatic hyperplasia)


Current Visit: Yes   Status: Chronic   Code(s): N40.0 - BENIGN PROSTATIC 

HYPERPLASIA WITHOUT LOWER URINRY TRACT SYMP   SNOMED Code(s): 020688806


   





(4) CAD (coronary artery disease)


Current Visit: Yes   Status: Chronic   Code(s): I25.10 - ATHSCL HEART DISEASE 

OF NATIVE CORONARY ARTERY W/O ANG PCTRS   SNOMED Code(s): 53135560


   





(5) COPD (chronic obstructive pulmonary disease)


Current Visit: Yes   Status: Chronic   Code(s): J44.9 - CHRONIC OBSTRUCTIVE 

PULMONARY DISEASE, UNSPECIFIED   SNOMED Code(s): 24144603


   





(6) Chronic atrial fibrillation


Current Visit: Yes   Status: Chronic   Code(s): I48.2 - CHRONIC ATRIAL 

FIBRILLATION   SNOMED Code(s): 143939603


   





(7) Chronic diastolic CHF (congestive heart failure)


Current Visit: Yes   Status: Chronic   Code(s): I50.32 - CHRONIC DIASTOLIC (

CONGESTIVE) HEART FAILURE   SNOMED Code(s): 788273533


   





(8) Chronic pain syndrome


Current Visit: Yes   Status: Chronic   Code(s): G89.4 - CHRONIC PAIN SYNDROME   

SNOMED Code(s): 552799100


   





(9) Diabetes mellitus type 2 in nonobese


Current Visit: Yes   Status: Chronic   Code(s): E11.9 - TYPE 2 DIABETES 

MELLITUS WITHOUT COMPLICATIONS   SNOMED Code(s): 282595528


   





(10) GERD (gastroesophageal reflux disease)


Current Visit: Yes   Status: Chronic   Code(s): K21.9 - GASTRO-ESOPHAGEAL 

REFLUX DISEASE WITHOUT ESOPHAGITIS   SNOMED Code(s): 285887538


   





(11) History of Coumadin therapy


Current Visit: Yes   Status: Chronic   Code(s): Z92.29 - PERSONAL HISTORY OF 

OTHER DRUG THERAPY   SNOMED Code(s): 568405988


   





(12) History of MRSA infection


Current Visit: No   Status: Resolved   Code(s): Z86.14 - PERSONAL HISTORY OF 

METHICILLIN RESIS STAPH INFECTION   SNOMED Code(s): 458145411


   





(13) History of permanent cardiac pacemaker placement


Current Visit: Yes   Status: Chronic   Code(s): Z95.0 - PRESENCE OF CARDIAC 

PACEMAKER   SNOMED Code(s): 170666765


   





(14) History of sick sinus syndrome


Current Visit: Yes   Status: Chronic   Code(s): Z86.79 - PERSONAL HISTORY OF 

OTHER DISEASES OF THE CIRCULATORY SYSTEM   SNOMED Code(s): 190730209534479


   





(15) History of smoking


Current Visit: No   Status: Resolved   Code(s): Z87.891 - PERSONAL HISTORY OF 

NICOTINE DEPENDENCE   SNOMED Code(s): 52613809168634457


   





(16) Hypertension


Current Visit: Yes   Status: Chronic   Code(s): I10 - ESSENTIAL (PRIMARY) 

HYPERTENSION   SNOMED Code(s): 35229119


   





(17) Lumbar disc disease


Current Visit: Yes   Status: Chronic   Code(s): M51.9 - UNSP THORACIC, 

THORACOLUM AND LUMBOSACR INTVRT DISC DISORDER   SNOMED Code(s): 408881503


   





(18) Non-ST elevated myocardial infarction


Current Visit: Yes   Status: Acute   Code(s): I21.4 - NON-ST ELEVATION (NSTEMI) 

MYOCARDIAL INFARCTION   SNOMED Code(s): 868432061


   





(19) Peripheral vascular disease


Current Visit: Yes   Status: Chronic   Code(s): I73.9 - PERIPHERAL VASCULAR 

DISEASE, UNSPECIFIED   SNOMED Code(s): 787338732


   


Plan: 





1.  Continue aspirin, statin, Plavix, Arixtra, ACE inhibitor, Toprol XL.  Will 

increase beta blocker therapy as tolerated.  


2.  Will give 5 mg Coumadin today.  Daily INRs.  Goal INR 2-2.5.  Will stop 

Arixtra and Plavix once INR is therapeutic.


3.  Wean O2 as tolerated.  Encourage incentive spirometry use 10 times every 

hour while awake.


4.  Encourage continued smoking cessation.


5.  Bronchodilators, steroids, antibiotics per pulmonology.  


6.  Increase activity, ambulate in hallway.  PT/OT/chronic rehab following.


7.  Will monitor daily labs and x-rays.


8.  Insulin management per Dr. Vang.


9.  Pain management with current medication regimen.


10.  GI/DVT prophylaxis.


11.  Reorient PRN.


12.  Discharge planning in progress.  Possible discharge to [facility] on Monday.  

Plan was for [facility], patient would prefer [facility].  Will discuss with case 

management in the morning.


13.  More recommendations to follow.


Time with Patient: Greater than 30

## 2018-06-17 NOTE — P.PN
Subjective


Progress Note Date: 06/17/18


Principal diagnosis: 





Coronary artery disease





Progress note dated 06/11/2018





78-year-old male with a history of non-ST segment elevation myocardial 

infarction, status post cardiac catheterization revealing severe triple-vessel 

CAD.  In addition, he is quite severe COPD, with an FEV1 in the range of 45% of 

predicted.  He has multiple additional comorbidities including chronic atrial 

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 

systolic heart failure.  The patient surgery is to be done today.  The patient 

is on O2 3 L nasal cannula and has a dextrose half-normal saline IV at [facility].  He 

was on a heparin drip but obviously that was turned off prior to surgery.  

Other than that, the patient is doing reasonably well.  He certainly is at high 

risk.  This has been documented by my partner.





Progress note dated 06/17/2018





78-year-old male with history of non-ST segment elevation myocardial 

infarction.  The patient underwent a bypass grafting.  I believe that was a 

three-vessel bypass.  He is doing much better.  Feeling better.  The patient 

was weaned from mechanical ventilation and less than 6 hours.  He has a history 

of multiple other medical problems including severe COPD with an FEV1 that's 45

% of predicted, chronic atrial fibrillation type 2 diabetes diabetic neuropathy

, peripheral vascular occlusive disease, osteomyelitis  hypertension, systolic 

heart failure.  The patient remains on a couple liters of O2.  Saturations are 

excellent.  Chest x-ray reveals some bibasilar atelectasis and small pleural 

effusions.





White count 13.6 hemoglobin 9 hematocrit 26.8 platelet count 86,000.  PT/INR 

normal.  Sodium 135 potassium 4.2 chloride 97 CO2 32 evening charles were 22 and 

0.7.  Sputum showed evidence of both pseudomonas aeruginosa and oxacillin 

sensitive staph aureus.





Objective





- Vital Signs


Vital signs: 


 Vital Signs











Temp  97 F L  06/16/18 23:47


 


Pulse  98   06/17/18 08:03


 


Resp  22   06/17/18 04:00


 


BP  106/56   06/17/18 04:00


 


Pulse Ox  100   06/17/18 07:35








 Intake & Output











 06/16/18 06/17/18 06/17/18





 18:59 06:59 18:59


 


Intake Total 358 360 


 


Balance 358 360 


 


Intake:   


 


  Oral 358 360 


 


Other:   


 


  # Voids 1 2 








 ABP, PAP, CO, CI - Last Documented











Arterial Blood Pressure        133/59


 


Pulmonary Artery Pressure      25/8


 


Cardiac Output                 5.5


 


Cardiac Index                  3.0

















- Exam





No acute distress, oriented 3.  Nasal O2 was noted.





HEENT examination is grossly unremarkable.  Mucous membranes are moist.  No 

oral lesions.





Neck supple.  Full range of motion.  No adenopathy thyromegaly or neck vein 

distention.





Cardiovascular examination reveals regular rhythm rate.  S1-S2 normal.  No S3 

or S4.  No discernible murmur noted.





Lungs reveal occasional mild rhonchi.  No wheezes.  No crackles.  Breath sounds 

equal bilaterally.  Diminished throughout.  





Abdomen soft bowel sounds are heard.  No masses or tenderness.





Extremities are intact.  No cyanosis clubbing or edema.





Skin is without rash or lesion.





Neurologic examination is brief but nonfocal.





- Labs


CBC & Chem 7: 


 06/17/18 05:49





 06/17/18 05:49


Labs: 


 Abnormal Lab Results - Last 24 Hours (Table)











  06/16/18 06/16/18 06/16/18 Range/Units





  11:41 17:02 20:43 


 


WBC     (3.8-10.6)  k/uL


 


RBC     (4.30-5.90)  m/uL


 


Hgb     (13.0-17.5)  gm/dL


 


Hct     (39.0-53.0)  %


 


RDW     (11.5-15.5)  %


 


Sodium     (137-145)  mmol/L


 


Chloride     ()  mmol/L


 


Carbon Dioxide     (22-30)  mmol/L


 


BUN     (9-20)  mg/dL


 


POC Glucose (mg/dL)  192 H  182 H  170 H  (75-99)  mg/dL


 


Calcium     (8.4-10.2)  mg/dL


 


Total Protein     (6.3-8.2)  g/dL


 


Albumin     (3.5-5.0)  g/dL














  06/17/18 06/17/18 06/17/18 Range/Units





  05:49 05:49 05:53 


 


WBC  13.6 H    (3.8-10.6)  k/uL


 


RBC  2.97 L    (4.30-5.90)  m/uL


 


Hgb  9.0 L    (13.0-17.5)  gm/dL


 


Hct  26.8 L    (39.0-53.0)  %


 


RDW  18.4 H    (11.5-15.5)  %


 


Sodium   135 L   (137-145)  mmol/L


 


Chloride   97 L   ()  mmol/L


 


Carbon Dioxide   32 H   (22-30)  mmol/L


 


BUN   22 H   (9-20)  mg/dL


 


POC Glucose (mg/dL)    112 H  (75-99)  mg/dL


 


Calcium   8.1 L   (8.4-10.2)  mg/dL


 


Total Protein   5.1 L   (6.3-8.2)  g/dL


 


Albumin   3.1 L   (3.5-5.0)  g/dL














Assessment and Plan


Assessment: 





Assessment





Status post four-vessel bypass grafting





Postoperative respiratory failure with hypoxemia





Pseudomonas and oxacillin sensitive staph aureus purulent tracheobronchitis as 

bronchopneumonia





Acute non-ST segment elevation myocardial infarction, secondary to severe triple

-vessel CAD





Severe COPD, stage III disease, with an FEV1 that is 45% of predicted





Chronic hypoxemic respiratory failure





Chronic atrial fibrillation





Type 2 diabetes mellitus





Diabetic neuropathy





PVOD





Osteomyelitis





History of hypertension





History of systolic heart failure.








Plan: 





Plan dated 06/11/2018





The patient's vent will be management the patient comes back from the operating 

room.  We'll make sure the patient's on updrafts every 4 around-the-clock.  The 

patient's will be assessed carefully.  We'll see if we can get the patient 

extubated as quickly as possible.  Given his multiple comorbidities and his 

severe COPD, that may precipitate a bit of a problem.  Additional 

recommendations and suggestions are forthcoming.  Post extubation, use of 

incentive spirometry and bronchodilators will be very important and continue 

deep breathing coughing and clearing his secretions be beneficial as well.  





Critical care time 33 minutes





Plan dated 06/17/2018





The patient continues to show improvement.  Respiratory status is much more 

stable.  He is on appropriate antibiotics for the current infection.  The 

patient continues on updrafts and all other appropriate medications.  We'll 

continue to follow.  Prognosis is guarded.


Time with Patient: Less than 30

## 2018-06-18 VITALS — RESPIRATION RATE: 19 BRPM

## 2018-06-18 VITALS — TEMPERATURE: 97.6 F | SYSTOLIC BLOOD PRESSURE: 97 MMHG | DIASTOLIC BLOOD PRESSURE: 54 MMHG

## 2018-06-18 VITALS — HEART RATE: 76 BPM

## 2018-06-18 LAB
ALBUMIN SERPL-MCNC: 3.1 G/DL (ref 3.5–5)
ALP SERPL-CCNC: 53 U/L (ref 38–126)
ALT SERPL-CCNC: 42 U/L (ref 21–72)
ANION GAP SERPL CALC-SCNC: 5 MMOL/L
AST SERPL-CCNC: 30 U/L (ref 17–59)
BUN SERPL-SCNC: 21 MG/DL (ref 9–20)
CALCIUM SPEC-MCNC: 8.2 MG/DL (ref 8.4–10.2)
CHLORIDE SERPL-SCNC: 97 MMOL/L (ref 98–107)
CO2 SERPL-SCNC: 32 MMOL/L (ref 22–30)
ERYTHROCYTE [DISTWIDTH] IN BLOOD BY AUTOMATED COUNT: 2.93 M/UL (ref 4.3–5.9)
ERYTHROCYTE [DISTWIDTH] IN BLOOD: 18.8 % (ref 11.5–15.5)
GLUCOSE BLD-MCNC: 105 MG/DL (ref 75–99)
GLUCOSE BLD-MCNC: 125 MG/DL (ref 75–99)
GLUCOSE BLD-MCNC: 153 MG/DL (ref 75–99)
GLUCOSE SERPL-MCNC: 96 MG/DL (ref 74–99)
HCT VFR BLD AUTO: 26.4 % (ref 39–53)
HGB BLD-MCNC: 8.5 GM/DL (ref 13–17.5)
INR PPP: 1.4 (ref ?–1.2)
MCH RBC QN AUTO: 29.1 PG (ref 25–35)
MCHC RBC AUTO-ENTMCNC: 32.3 G/DL (ref 31–37)
MCV RBC AUTO: 90 FL (ref 80–100)
PLATELET # BLD AUTO: 190 K/UL (ref 150–450)
POTASSIUM SERPL-SCNC: 4.2 MMOL/L (ref 3.5–5.1)
PROT SERPL-MCNC: 5.1 G/DL (ref 6.3–8.2)
PT BLD: 12.9 SEC (ref 9–12)
SODIUM SERPL-SCNC: 134 MMOL/L (ref 137–145)
WBC # BLD AUTO: 15 K/UL (ref 3.8–10.6)

## 2018-06-18 RX ADMIN — BUDESONIDE AND FORMOTEROL FUMARATE DIHYDRATE SCH PUFF: 160; 4.5 AEROSOL RESPIRATORY (INHALATION) at 07:29

## 2018-06-18 RX ADMIN — SODIUM CHLORIDE, PRESERVATIVE FREE SCH ML: 5 INJECTION INTRAVENOUS at 07:46

## 2018-06-18 RX ADMIN — LEVOFLOXACIN SCH MG: 750 TABLET, FILM COATED ORAL at 07:45

## 2018-06-18 RX ADMIN — MUPIROCIN SCH APPLIC: 20 OINTMENT TOPICAL at 07:46

## 2018-06-18 RX ADMIN — IPRATROPIUM BROMIDE AND ALBUTEROL SULFATE SCH ML: .5; 3 SOLUTION RESPIRATORY (INHALATION) at 11:12

## 2018-06-18 RX ADMIN — INSULIN ASPART SCH: 100 INJECTION, SOLUTION INTRAVENOUS; SUBCUTANEOUS at 05:53

## 2018-06-18 RX ADMIN — ASPIRIN 325 MG ORAL TABLET SCH MG: 325 PILL ORAL at 07:46

## 2018-06-18 RX ADMIN — FONDAPARINUX SODIUM SCH MG: 2.5 INJECTION, SOLUTION SUBCUTANEOUS at 07:46

## 2018-06-18 RX ADMIN — GABAPENTIN SCH MG: 100 CAPSULE ORAL at 07:46

## 2018-06-18 RX ADMIN — TAMSULOSIN HYDROCHLORIDE SCH MG: 0.4 CAPSULE ORAL at 07:46

## 2018-06-18 RX ADMIN — IPRATROPIUM BROMIDE AND ALBUTEROL SULFATE SCH ML: .5; 3 SOLUTION RESPIRATORY (INHALATION) at 15:07

## 2018-06-18 RX ADMIN — METOPROLOL SUCCINATE SCH MG: 50 TABLET, EXTENDED RELEASE ORAL at 07:46

## 2018-06-18 RX ADMIN — CLOPIDOGREL BISULFATE SCH MG: 75 TABLET ORAL at 07:45

## 2018-06-18 RX ADMIN — IPRATROPIUM BROMIDE AND ALBUTEROL SULFATE SCH ML: .5; 3 SOLUTION RESPIRATORY (INHALATION) at 07:29

## 2018-06-18 RX ADMIN — PANTOPRAZOLE SODIUM SCH MG: 40 TABLET, DELAYED RELEASE ORAL at 06:26

## 2018-06-18 RX ADMIN — ATORVASTATIN CALCIUM SCH MG: 40 TABLET, FILM COATED ORAL at 07:45

## 2018-06-18 RX ADMIN — LISINOPRIL SCH MG: 2.5 TABLET ORAL at 11:35

## 2018-06-18 RX ADMIN — ACETAMINOPHEN PRN MG: 500 TABLET ORAL at 03:24

## 2018-06-18 RX ADMIN — INSULIN ASPART SCH: 100 INJECTION, SOLUTION INTRAVENOUS; SUBCUTANEOUS at 11:35

## 2018-06-18 RX ADMIN — GLIMEPIRIDE SCH MG: 1 TABLET ORAL at 06:26

## 2018-06-18 NOTE — P.PN
Subjective


Progress Note Date: 06/18/18


Principal diagnosis: 


Coronary artery disease, status post coronary artery bypass grafting, postop 

day 1





Progress note dated 06/11/2018





78-year-old male with a history of non-ST segment elevation myocardial 

infarction, status post cardiac catheterization revealing severe triple-vessel 

CAD.  In addition, he is quite severe COPD, with an FEV1 in the range of 45% of 

predicted.  He has multiple additional comorbidities including chronic atrial 

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 

systolic heart failure.  The patient surgery is to be done today.  The patient 

is on O2 3 L nasal cannula and has a dextrose half-normal saline IV at KVO.  He 

was on a heparin drip but obviously that was turned off prior to surgery.  

Other than that, the patient is doing reasonably well.  He certainly is at high 

risk.  This has been documented by my partner.





On 06/12/2018 patient seen in follow-up in the intensive care unit.  This is 

postop day 1, status post quadruple coronary artery bypass grafting using the 

LIMA to LAD, reverse SVG to OM1, reverse SVG to OM 2, reverse SVG to the RCA, 

exclusion of the left atrial appendage, and intraoperative SKIP and AP aortic 

scanning.  Patient was extubated in less than 6 hours post OR exit time.  

Patient was extubated at 9:00 last night successfully to a 40% Ventimask, on 

which she remains this morning.  He is awake alert, sitting up in the recliner, 

doing well, denies any acute distress.  Midsternal incision is stable, clean 

dry and intact


One mediastinal and one left pleural chest tube draining small amount of 

serosanguineous drainage.  Maintenance IV includes LR at a rate of 50 ML per 

hour, insulin drip at 2 units per hour, no vasoactive drips at this time, 

clever proximal has been discontinued, as well as nitroglycerin drip.  Toth 

catheter is in place, and the urine output is ranging from 30-75 ML per hour.  

Cardiac index and output is 5.5 and 3.0 respectively.  Dissipate 

discontinuation of the PA catheter this morning.  PA pressures 28/11, with a 

map of 19.  Patient has not required any blood transfusions.  Today's labs were 

reviewed, WBCs 11.2, hemoglobin is 9.0, INR is 1.4, potassium is 5.3, BUN is 33

, creatinine 0.80.  Today's chest x-ray has been reviewed and shows basilar 

atelectasis versus edema.  Lung sounds positive for some crackles over left 

lower lobe anteriorly, diminished on the right.  Patient Solu-Medrol's has been 

switched to oral prednisone. 





On 06/13/2018 patient seen in follow-up in intensive care unit.  This postop 

day 2, status post coronary artery bypass grafting 4, and exclusion of the 

left atrial appendage.  He is awake and alert, remains on 40% Ventimask, today'

s exam patient is a bit more congested, there is scattered rhonchi, and wheezes 

bilaterally.  His chest x-ray has been reviewed and shows new right basilar 

airspace disease that could represent atelectasis or developing pneumonia, 

stable left basilar subsegmental atelectasis, and trace right pleural effusion.

  Incentive spirometry effort is 800 mL today.  He is still chest tube has been 

removed, left pleural chest tube remains in place with serosanguineous output.  

He remains in A. fib with frequent PVCs.  He was started on Arixtra for 

anticoagulation, he was given a dose of IV digoxin for rate control.  Denies 

any chest pain or dyspnea.  Sternal incision is clean dry and intact, stable.  

Braddock-Pam catheter has been removed.  Maintenance in the IV include LR at a 

rate of 30 ML per hour, no other drips.  Which the oral prednisone back to Solu-

Medrol 40 mg every 8 hours, we'll discontinue Symbicort and put the patient on 

Pulmicort. 





On 06/14/2018 patient seen in follow-up in the intensive care unit.  He is 

confused, and only oriented to person and the president.  Nursing staff reports 

patient is not sleeping well, and was having periods of agitation, during which 

she pulled out and IV.  Safety sitter is at the bedside this morning.  Patient 

is awake, alert, and appears to be in no acute distress.  Denies any dyspnea, 

his pain is reasonable control.  Pox on 5 L per nasal cannula is 99%.  

Hemodynamically stable.  Today's chest x-ray has been reviewed by Dr. Denson, 

and shows small pleural effusion, and basilar atelectasis.  Cardiothoracic 

surgery has been dosing patient's IV Lasix, and the patient will receive 

another 20 mg of Lasix this morning.  Continues on nebulized bronchodilators, 

has a loose productive cough with tan-colored thick sputum, and the sputum 

culture was positive for pseudomonas aeruginosa, and presumptive staph aureus.  

She will be started on oral Cipro, and further adjustment antibiotics will be 

made once the final culture and sensitivity is done on organism #2, for 

presumptive staph aureus.  Patient remains afebrile.  Lung sounds are positive 

for scattered rhonchi, and bibasilar crackles.  Patient's incentive spirometry 

effort today's 1250 ML.  Mediastinal chest tube has been discontinued yesterday

, left pleural chest tube remains in place with serosanguineous output.  

Epicardial wires have been discontinued.  Today lab work has been reviewed, and 

shows no leukocytosis, WBCs 10.2, hemoglobin is 8.7, platelet count is 84, 

serum sodium is 135, potassium is 4.5, chloride is 97, BUN 35, creatinine 0.80.

  Patient is tolerating a regular diet.  Nonoliguric.





On 06/15/2018 patient seen again in follow-up in the intensive care unit.  He 

sitting up in the recliner, currently on 3 L per nasal cannula, denies any 

acute distress, no chest pain, no shortness of breath, no cough or congestion.  

Lung sounds are diminished with a few scattered rhonchi, no significant 

wheezing noted.  Overall improved compared to yesterday's exam.  Midsternal 

incision is clean dry and intact, sternum stable, no edema present.  Hugger is 

in place, and the patient is utilizing his incentive spirometer able to achieve 

1388-5525 mL today.  Today's chest x-ray has been reviewed and still shows 

small pleural effusions, and basilar atelectasis, stable exam.  Patient remains 

in A. fib, with a controlled rate, he had been started on Arixtra for 

anticoagulation, he was given 20 mg of IV Lasix yesterday, and he is in -1558 

mL.  His left pleural chest tube has been discontinued.  No IV fluids or drips 

at this time, . he is tolerating oral diet, no fever or chills, microbiology 

results were reviewed, and the sputum culture was positive for pseudomonas 

aeruginosa, and Staphylococcus aureus, oxacillin sensitive.  Both organisms are 

susceptible to Levaquin, hence we will switch to Cipro to Levaquin. 





On 06/18/2018 patient seen again on selective care unit.  He sitting up in the 

chair, eating lunch, in no acute distress.  Pulse ox on 3 L per nasal cannula 

is 100%, but signs are stable, the patient is afebrile, denies any dyspnea, 

denies any chest pain, lung sounds are clear to auscultation, no rhonchi, no 

wheezes.  She is compliant with his incentive spirometer, and is able to 

achieve [phone] on it today.  This is postop day 8 post 4 vessel coronary 

artery bypass grafting with left atrial appendage exclusion.  Chest x-ray has 

been reviewed by Dr. Smith, and shows bilateral infiltrate and small pleural 

effusion, COPD.  Patient has been treated with Levaquin for evidence of 

pseudomonas aeruginosa in the preop sputum culture and MSSA both susceptible to 

Levaquin.  Today's labs were noted.  Discharge planning is in progress for 

discharge to monitor with nursing and rehab today or tomorrow.





Objective





- Vital Signs


Vital signs: 


 Vital Signs











Temp  97.8 F   06/18/18 07:54


 


Pulse  72   06/18/18 11:25


 


Resp  19   06/18/18 07:54


 


BP  103/59   06/18/18 09:17


 


Pulse Ox  100   06/18/18 09:17








 Intake & Output











 06/17/18 06/18/18 06/18/18





 18:59 06:59 18:59


 


Intake Total 1010  360


 


Output Total 600 250 1


 


Balance 410 -250 359


 


Weight  76.8 kg 


 


Intake:   


 


  Oral 1010  360


 


Output:   


 


  Urine 600 250 


 


  Stool   1


 


Other:   


 


  Voiding Method   Bedside Commode





   Urinal


 


  # Voids 1 1 


 


  # Bowel Movements 1  








 ABP, PAP, CO, CI - Last Documented











Arterial Blood Pressure        133/59


 


Pulmonary Artery Pressure      25/8


 


Cardiac Output                 5.5


 


Cardiac Index                  3.0

















- Exam


GENERAL EXAM: Alert, pleasant, 70-year-old white male comfortable in no 

apparent distress,  currently on 3 L per nasal cannula


HEAD: Normocephalic/atraumatic.


EYES: Normal reaction of pupils, equal size.  Conjunctiva pink, sclera white.


NOSE: Clear with pink turbinates.


THROAT: No erythema or exudates.


NECK: No masses, no JVD, no thyroid enlargement, no adenopathy.  


CHEST: No chest wall deformity.  Symmetrical expansion.  Midsternal incision is 

clean dry and intact and stable.  Mediastinal, left pleural chest tubes and 

epicardial wires have been all discontinued


LUNGS: Equal air entry bilateral rhonchi and scattered wheezes


CVS: Irregular rate and rhythm, normal S1 and S2, no gallops, no murmurs, no 

rubs


ABDOMEN: Soft, nontender.  No hepatosplenomegaly, normal bowel sounds, no 

guarding or rigidity.


EXTREMITIES: No clubbing, no edema, no cyanosis, 2+ pulses and upper and lower 

extremities.  There is a JOCE drain in the right lower leg, right lower leg is 

Ace wrapped


MUSCULOSKELETAL: Muscle strength and tone normal.


SPINE: No scoliosis or deformity


SKIN: No rashes


CENTRAL NERVOUS SYSTEM: Alert and oriented -3.  No focal deficits, tone is 

normal in all 4 extremities.


PSYCHIATRIC: Alert and oriented -3.  Mentation is much improved on today's exam











- Labs


CBC & Chem 7: 


 06/18/18 05:42





 06/18/18 05:42


Labs: 


 Abnormal Lab Results - Last 24 Hours (Table)











  06/17/18 06/17/18 06/18/18 Range/Units





  16:32 21:11 02:05 


 


WBC     (3.8-10.6)  k/uL


 


RBC     (4.30-5.90)  m/uL


 


Hgb     (13.0-17.5)  gm/dL


 


Hct     (39.0-53.0)  %


 


RDW     (11.5-15.5)  %


 


PT     (9.0-12.0)  sec


 


INR     (<1.2)  


 


Sodium     (137-145)  mmol/L


 


Chloride     ()  mmol/L


 


Carbon Dioxide     (22-30)  mmol/L


 


BUN     (9-20)  mg/dL


 


POC Glucose (mg/dL)  212 H  176 H  153 H  (75-99)  mg/dL


 


Calcium     (8.4-10.2)  mg/dL


 


Total Protein     (6.3-8.2)  g/dL


 


Albumin     (3.5-5.0)  g/dL














  06/18/18 06/18/18 06/18/18 Range/Units





  05:42 05:42 05:42 


 


WBC  15.0 H    (3.8-10.6)  k/uL


 


RBC  2.93 L    (4.30-5.90)  m/uL


 


Hgb  8.5 L    (13.0-17.5)  gm/dL


 


Hct  26.4 L    (39.0-53.0)  %


 


RDW  18.8 H    (11.5-15.5)  %


 


PT    12.9 H  (9.0-12.0)  sec


 


INR    1.4 H  (<1.2)  


 


Sodium   134 L   (137-145)  mmol/L


 


Chloride   97 L   ()  mmol/L


 


Carbon Dioxide   32 H   (22-30)  mmol/L


 


BUN   21 H   (9-20)  mg/dL


 


POC Glucose (mg/dL)     (75-99)  mg/dL


 


Calcium   8.2 L   (8.4-10.2)  mg/dL


 


Total Protein   5.1 L   (6.3-8.2)  g/dL


 


Albumin   3.1 L   (3.5-5.0)  g/dL














  06/18/18 06/18/18 Range/Units





  05:47 11:29 


 


WBC    (3.8-10.6)  k/uL


 


RBC    (4.30-5.90)  m/uL


 


Hgb    (13.0-17.5)  gm/dL


 


Hct    (39.0-53.0)  %


 


RDW    (11.5-15.5)  %


 


PT    (9.0-12.0)  sec


 


INR    (<1.2)  


 


Sodium    (137-145)  mmol/L


 


Chloride    ()  mmol/L


 


Carbon Dioxide    (22-30)  mmol/L


 


BUN    (9-20)  mg/dL


 


POC Glucose (mg/dL)  105 H  125 H  (75-99)  mg/dL


 


Calcium    (8.4-10.2)  mg/dL


 


Total Protein    (6.3-8.2)  g/dL


 


Albumin    (3.5-5.0)  g/dL














Assessment and Plan


Plan: 


Assessment:





Acute non-ST segment elevation myocardial infarction, secondary to severe triple

-vessel CAD, status post quadruple coronary artery bypass grafting with LIMA to 

LAD, reverse SVG to OM1, reverse SVG to OM 2, and reverse SVG to RCA, and 

exclusion of the left atrial appendage with AtriCure clip, postop day 8





Severe COPD, stage III disease, with an FEV1 that is 45% of predicted





Chronic hypoxemic respiratory failure





Sputum cultures positive for pseudomonas aeruginosa and oxacillin sensitive 

Staphylococcus aureus from's 06/11/2018





Chronic atrial fibrillation





Type 2 diabetes mellitus





Diabetic neuropathy





PVOD





Osteomyelitis





History of hypertension





History of systolic heart failure.








Plan: 





Continue current medical treatment, continue encouraging incentive spirometry, 

ambulation.  Patient is doing well, stable.  Today's chest x-ray has been 

reviewed with Dr. Smith, showed small bilateral pleural effusions.  Patient 

has been receiving IV diuretics per to CT surgery.  We'll continue Levaquin, 

continue oral prednisone.  Discharge planning is in progress for transfer to 

[facility] and rehab today or tomorrow.


I performed a history & physical examination of the patient and discussed their 

management with my nurse practitioner, Keeley Self.  I reviewed the nurse 

practitioner's note and agree with the documented findings and plan of care.  

Lung sounds are coarse lung sounds, with a few rhonchi.  The findings and the 

impression was discussed with the patient.  I attest to the documentation by 

the nurse practitioner. 


Time with Patient: Less than 30

## 2018-06-18 NOTE — XR
EXAMINATION TYPE: XR chest 2V

 

DATE OF EXAM: 6/18/2018

 

COMPARISON: 6/17/2018

 

TECHNIQUE: PA and lateral views submitted.

 

HISTORY: Post cardiac surgery

 

FINDINGS:

Postsurgical changes are noted there is hypertrophic and degenerative change of the spine. Cardiac de
vice is seen and there is bilateral infiltrate and pleural effusion. Heart is enlarged. No overt fail
ure.

 

IMPRESSION:

1. Bilateral infiltrate and small effusion.

2. COPD.

## 2018-06-18 NOTE — P.DS
Providers


Date of admission: 


06/08/18 14:25





Expected date of discharge: 06/18/18


Attending physician: 


Galina Wolfe





Consults: 





 





06/08/18 15:04


Consult Physician Routine 


   Consulting Provider: Galina Wolfe


   Consult Reason/Comments: triple vessel disease


   Do you want consulting provider notified?: Already Contacted


Consult Physician Routine 


   Consulting Provider: Julisa Del Cid


   Consult Reason/Comments: ICU management


   Do you want consulting provider notified?: Already Contacted


Consult Physician Routine 


   Consulting Provider: Brennan Hylton


   Consult Reason/Comments: chest pain


   Do you want consulting provider notified?: Already Contacted





06/11/18 14:41


Consult Physician Routine 


   Consulting Provider: Javan Vang


   Consult Reason/Comments: medical mangement


   Do you want consulting provider notified?: Already Contacted











Primary care physician: 


Stated None








- Discharge Diagnosis(es)


(1) Family history of early CAD


Current Visit: Yes   Status: Chronic   





(2) On home oxygen therapy


Current Visit: Yes   Status: Chronic   





(3) BPH (benign prostatic hyperplasia)


Current Visit: Yes   Status: Chronic   





(4) CAD (coronary artery disease)


Current Visit: Yes   Status: Chronic   





(5) COPD (chronic obstructive pulmonary disease)


Current Visit: Yes   Status: Chronic   





(6) Chronic atrial fibrillation


Current Visit: Yes   Status: Chronic   





(7) Chronic diastolic CHF (congestive heart failure)


Current Visit: Yes   Status: Chronic   





(8) Chronic pain syndrome


Current Visit: Yes   Status: Chronic   





(9) Diabetes mellitus type 2 in nonobese


Current Visit: Yes   Status: Chronic   





(10) GERD (gastroesophageal reflux disease)


Current Visit: Yes   Status: Chronic   





(11) History of Coumadin therapy


Current Visit: Yes   Status: Chronic   





(12) History of MRSA infection


Current Visit: No   Status: Resolved   





(13) History of permanent cardiac pacemaker placement


Current Visit: Yes   Status: Chronic   





(14) History of sick sinus syndrome


Current Visit: Yes   Status: Chronic   





(15) History of smoking


Current Visit: No   Status: Resolved   





(16) Hypertension


Current Visit: Yes   Status: Chronic   





(17) Lumbar disc disease


Current Visit: Yes   Status: Chronic   





(18) Non-ST elevated myocardial infarction


Current Visit: Yes   Status: Acute   





(19) Peripheral vascular disease


Current Visit: Yes   Status: Chronic   


Hospital Course: 





FINAL DIAGNOSIS: 


1.  Symptomatic triple-vessel coronary artery disease with left main disease


2.  Non-ST elevation myocardial infarction


3.  Mild to moderate left ventricular dysfunction


4.  Chronic persistent atrial fibrillation on home Coumadin therapy


5.  Sick sinus syndrome, status post permanent pacemaker insertion


6.  Chronic systolic heart failure with EF 40-45% and diastolic dysfunction


7.  Hypertension


8.  Hyperlipidemia


9.  Carotid stenosis, 50-79% bilaterally


10.  Previous tobacco dependence


11.  Severe chronic objective pulmonary disease with home oxygen with 

preoperative FEV1 45% of predicted


12.  Arthritis


13.  Benign prostatic hypertrophy


14.  Gastroesophageal reflux disease


15.  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 

amputation


16.  Type 2 diabetes mellitus with preoperative hemoglobin A1c 5.9%


17.  Peripheral vascular disease


18.  Remote history of pneumonia


19.  Family history of early onset coronary artery disease


20.  Preoperative nasal swab positive for MSSA


21.  Preoperative sputum culture positive for pseudomonas and MSSA





PRINCIPAL PROCEDURE: 


1.  Urgent quadruple coronary artery bypass grafting using left internal 

mammary artery to the left anterior descending artery, reverse saphenous vein 

graft from the aorta to the first obtuse marginal artery, reverse saphenous 

vein graft from the aorta to the second obtuse marginal artery, reverse 

saphenous vein graft from the aorta to the right coronary artery


2.  Exclusion of the left atrial appendage using a 40 mm AtriClip


3.  Endoscopic harvesting of the right greater saphenous vein


4.  Intraoperative transesophageal echocardiogram and epi-aortic scanning


5.  Intraoperative graft flow measurements using the [employer] system





HISTORY OF PRESENT ILLNESS: This is a 78-year-old gentleman who follows with 

Dr. Vang on an outpatient basis.  He presented to the emergency department 

at [facility] with complaints of anginal-type chest pain and 

shortness of breath over a few days.  EKG completed in the emergency room 

demonstrated atrial fibrillation with T-wave inversions in the anterior lateral 

leads.  Troponins were elevated and patient was recommended to undergo heart 

catheterization which demonstrated chronically occluded right coronary artery 

with collaterals from the left system, left main with mild disease, mild 

disease to the proximal circumflex, 70% stenosis to the first obtuse marginal 

coronary artery, and critical lesion to the proximal and mid left anterior 

descending coronary artery.  A 2-D echocardiogram was also completed at [facility] demonstrating mild concentric left ventricular hypertrophy 

with mild to moderately impaired systolic function and an ejection fraction 40-

45%, mild aortic regurgitation, mild mitral valve regurgitation, and trace 

tricuspid valve regurgitation.  The patient was subsequently transferred to 

[facility] and the consult was placed for Dr. Wolfe from 

cardiothoracic surgery for the possibility of surgical revascularization.  Upon 

completion of preoperative testing coronary artery bypass grafting surgery was 

recommended, an extensive discussion was had with the patient and his family, 

risks and benefits were explained in detail, all questions were answered, and 

consent was obtained to proceed with surgery.





HOSPITAL COURSE: On 06/11/2018, the patient was taken to the preoperative area, 

prepared in the usual fashion, and subsequently taken to the operating room or 

Dr. Wolfe performed an urgent quadruple coronary artery bypass grafting using 

left internal mammary artery to the left anterior descending artery, reverse 

saphenous vein graft from the aorta to the first obtuse marginal artery, 

reverse saphenous vein graft from the aorta to the second obtuse marginal artery

, reverse saphenous vein graft from the aorta to the right coronary artery, 

exclusion of the left atrial appendage using a 40 mm AtriClip, endoscopic 

harvesting of the right greater saphenous vein, intraoperative transesophageal 

echocardiogram, epi-aortic scanning, and intraoperative graft flow measurements 

using the [employer] system.  Upon completion of surgery the patient was 

transferred to the cardiovascular intensive care unit where he was recovered, 

monitored hemodynamically, and where he progressed to cardiac rehabilitation 

phase 1.  He was extubated, all lines, tubes, and drips were discontinued when 

appropriate, and he was transferred to [address] [place] [address] for further 

monitoring and rehabilitation.  His oxygen was titrated down to 3 L nasal 

cannula, he continued to work with physical and occupational therapy, and was 

ready to be discharged to subacute rehab on postop day #7.  He received written 

and verbal instruction regarding his medications, activity restrictions, signs 

and symptoms requiring physician notification, and follow-up appointments.





COMPLICATIONS: The patient experienced postoperative normocytic, normochromic 

anemia, and expected outcome of surgery and heparin-induced thrombocytopenia, 

both treated accordingly.





























Plan - Discharge Summary


Discharge Rx Participant: No


New Discharge Prescriptions: 


New


   Acetaminophen Tab [Tylenol] 500 mg PO Q4HR PRN  tab


     PRN Reason: Fever And/ Or Pain


   Aspirin 325 mg PO DAILY  tab


   Atorvastatin [Lipitor] 40 mg PO DAILY  tab


   Budesonide-Formot 160-4.5 Mcg [Symbicort 160-4.5 Mcg Inhaler] 2 puff 

INHALATION RT-BID  puff


   Clopidogrel [Plavix] 75 mg PO DAILY #0 tab


   Fondaparinux [Arixtra] 2.5 mg SQ DAILY  syringe


   Glimepiride [Amaryl] 1 mg PO AC-BRKFST  tab


   Insulin Aspart [NovoLOG (formulary)] 0 unit SQ ACHS  vial


   Ipratropium-Albuterol Nebulize [Duoneb 0.5 mg-3 mg/3 ml Soln] 3 ml 

INHALATION RT-Q2H PRN  ampul.neb


     PRN Reason: Shortness Of Breath Or Wheezing


   Levofloxacin [Levaquin] 750 mg PO DAILY #7 tab


   Lisinopril [Zestril] 2.5 mg PO DAILY@1200  tab


   Magnesium Hydroxide [Milk of Magnesia Concentrate] 2,400 mg PO BID PRN  ml


     PRN Reason: Constipation


   Melatonin 2 mg PO HS  tab


   Metoprolol Succinate (ER) [Toprol XL] 50 mg PO BID  tab.er.24h


   Pantoprazole [Protonix] 40 mg PO AC-BRKFST  tablet.dr


   predniSONE 10 mg PO DAILY  tab





Continue


   Tamsulosin HCl [Flomax] 0.4 mg PO DAILY


   Ipratropium-Albuterol Nebulize [Duoneb 0.5 mg-3 mg/3 ml Soln] 3 ml 

INHALATION RT-QID


   Gabapentin [Neurontin] 100 mg PO TID


   Warfarin [Coumadin] 5 mg PO DAILY #0





Discontinued


   Furosemide [Lasix] 40 mg PO DAILY


   Digoxin [Lanoxin] 125 mcg PO DAILY


   Losartan [Cozaar] 50 mg PO DAILY


   Lisinopril [Zestril] 10 mg PO DAILY


   Atenolol [Tenormin] 50 mg PO BID


   glipiZIDE XL [Glucotrol Xl] 2.5 mg PO DAILY


   Ranitidine HCl 150 mg PO HS


Discharge Medication List





Gabapentin [Neurontin] 100 mg PO TID 06/08/18 [History]


Ipratropium-Albuterol Nebulize [Duoneb 0.5 mg-3 mg/3 ml Soln] 3 ml INHALATION RT

-QID 06/08/18 [History]


Tamsulosin HCl [Flomax] 0.4 mg PO DAILY 06/08/18 [History]


Acetaminophen Tab [Tylenol] 500 mg PO Q4HR PRN  tab 06/18/18 [Rx]


Aspirin 325 mg PO DAILY  tab 06/18/18 [Rx]


Atorvastatin [Lipitor] 40 mg PO DAILY  tab 06/18/18 [Rx]


Budesonide-Formot 160-4.5 Mcg [Symbicort 160-4.5 Mcg Inhaler] 2 puff INHALATION 

RT-BID  puff 06/18/18 [Rx]


Clopidogrel [Plavix] 75 mg PO DAILY #0 tab 06/18/18 [Rx]


Fondaparinux [Arixtra] 2.5 mg SQ DAILY  syringe 06/18/18 [Rx]


Glimepiride [Amaryl] 1 mg PO AC-BRKFST  tab 06/18/18 [Rx]


Insulin Aspart [NovoLOG (formulary)] 0 unit SQ ACHS  vial 06/18/18 [Rx]


Ipratropium-Albuterol Nebulize [Duoneb 0.5 mg-3 mg/3 ml Soln] 3 ml INHALATION RT

-Q2H PRN  ampul.neb 06/18/18 [Rx]


Levofloxacin [Levaquin] 750 mg PO DAILY #7 tab 06/18/18 [Rx]


Lisinopril [Zestril] 2.5 mg PO DAILY@1200  tab 06/18/18 [Rx]


Magnesium Hydroxide [Milk of Magnesia Concentrate] 2,400 mg PO BID PRN  ml 06/18 /18 [Rx]


Melatonin 2 mg PO HS  tab 06/18/18 [Rx]


Metoprolol Succinate (ER) [Toprol XL] 50 mg PO BID  tab.er.24h 06/18/18 [Rx]


Pantoprazole [Protonix] 40 mg PO AC-BRKFST  tablet. 06/18/18 [Rx]


Warfarin [Coumadin] 5 mg PO DAILY #0 06/18/18 [Rx]


predniSONE 10 mg PO DAILY  tab 06/18/18 [Rx]








Follow up Appointment(s)/Referral(s): 


Julisa Del Cid MD [STAFF PHYSICIAN] - 07/09/18 10:30 am


Galina Wolfe MD [STAFF PHYSICIAN] - 07/13/18 10:45 am


Javan Vang MD [STAFF PHYSICIAN] - 1 Week (Please make appointment to 

follow up with Dr. Vang upon discharge from [facility].)


Eric Shrestha MD [STAFF PHYSICIAN] - 07/02/18 3:45 pm


(At [facility] 


[address]


[place]


[phone])


Ambulatory/Diagnostic Orders: 


Complete Blood Count w/diff [LAB.AMB] Time Frame: 3 Days, Location: Determined 

By Patient


Comprehensive Metabolic Panel [LAB.AMB] Time Frame: 3 Days, Location: 

Determined By Patient


Prothrombin Time INR [LAB.AMB] Time Frame: 1 Day, Facility: [facility], 

Location: [facility]


Activity/Diet/Wound Care/Special Instructions: 


DISCHARGE INSTRUCTIONS: 


1.  No driving for 4 weeks, or until physician gives their ok.


2.  The patient should sleep in their own bed, no medical bed needed.


3.  Stairs are not an issue.  If the bedroom is upstairs, it is advised that 

the patient go up at night and down in the morning for the first week.  Go 

slowly, using handrail and take 1 step at a time.


4.  ROCIO hose are to be worn for 30 days or until physician discontinues.


5.  Heart hugger is to be worn 100% of the time until physician discontinues.(

except when showering)


6.  No lifting, pushing, or pulling more than 10 pounds for 12 weeks.  The 

physician will advise of any restriction changes.


7.  The patient is expected to continue the prescribed walking program.


8.  Continue pain control per as needed orders.


9.  Continue with incentive spirometry and splinting/heart hugger until 

otherwise directed by the physician.


10. Must shower daily using liquid antibacterial soap and a separate white 

washcloth for each individual incision.


11. Routine sternal incision care. No powders, lotions, ointments on incisions.


12. Please call surgeon/NP for temp greater than 101 F or purulent drainage 

from incisions.


13. All prescriptions refills need to be filled through cardiologist/primary 

care physician.


15. A Red armband has been placed on the patient.  It should be worn for 30 

days post surgery and will be removed by the cardiac surgeons.  If an ER visit 

is necessary, please make sure the number on the Red armband is called.





REHAB FACILITIES/HOME HEALTH SERVICES TO PROVIDE:  


   RN SKILLED HOME CARE SERVICES FOR POST-OP SURGICAL PATIENTS WITH THE 

FOLLOWING: Coronary Artery Bypass Surgery (CABG), Mitral Valve Replacement/

Repair ( MVR), Aortic Valve Replacement/Repair (AVR)


   RN TO CONTINUE EDUCATION FROM ``ROAD TO A HEALTH HEART PATIENT EDUCATION 

MANUAL (GIVEN TO PATIENT IN THE HOSPITAL)


   MEDICATION RECONCILIATION WITH EDUCATION AS NEEDED ON FIRST HOME VISIT


   EMPHASIZE IMPORTANCE OF WEARING BREAST SUPPORT/HEART HUGGER


   ENCOURAGE USE OF INCENTIVE SPIROMETER 10 X EVERY HOUR WHILE AWAKE


   ENCOURAGE UTILIZATION OF LOWER EXTREMITY COMPRESSION STOCKINGS/ROCIO HOSE and 

ELEVATE LEGS ABOVE LEVEL OF HEART WHILE AT REST.  


   ENCOURAGE AMBULATION 3-5x/day  INCREASING AS TOLERATES, WHILE AVOID 

EXTREMES IN TEMPERATURE





FREQUENCY: RN TO OPEN THE PATIENT WITHIN 24 HOURS OF DISCHARGE FROM THE HOSPITAL

/REHAB FACILITY WITH TELEHEALTH INSTALLED AT [facility] [place], RN TO VISIT 2-3 X A WEEK FOR 

4 WEEKS AS ESTABLISHED BY PATIENT NEEDS.





LABORATORY:


   CBC, CMP TO BE DRAWN ON THE THIRD DAY HOME, Thursday 6/21/2018 (RAN AS STAT

) FAX RESULTS [phone].


                For patients on Coumadin, PT/INR to be drawn daily, ran as STAT

, and results faxed to [facility] at [phone] for Coumadin 

dosing.  





TELEHEALTH PARAMETERS:


   WEIGHT: NOTIFY MD OF WEIGHT GAIN OF 2 LBS IN 24 HOURS OR 5 LBS IN ONE WEEK


   HR: NOTIFY MD OF HR <55 BPM OR HR>100 BPM


   BP: NOTIFY MD IF BP <90/55 OR BP>140/100


   O2 SAT: NOTIFY MD IF PO2<93% ON ROOM AIR





SEND TELEHEALTH REPORT TO CARDIOLOGIST AND CARDIOVASCULAR SURGEON THE FIRST 

WEEK OF CARE AND THEN BI-WEEKLY.  PLEASE ADDITIONALLY COMMUNICATE ANY ABNORMALS 

AND NEW FINDINGS TO THE SURGEONS OFFICE.

## 2018-06-18 NOTE — P.PN
Subjective


Progress Note Date: 06/18/18


Principal diagnosis: 





CABG


His is a 78-year-old  gentleman with history of non-ST elevation 

myocardial infarction, status post cardiac cath which revealed severe triple-

vessel CAD, he also has history of severe COPD, chronic persistent atrial 

fibrillation, diabetes, hypertension, hyperlipidemia.  He is status post 

coronary bypass grafting surgery, seen on the telemetry unit this morning, 

sitting up in the chair at bedside.  Feels well overall, breathing stable.  

Blood pressure 114/56 with a heart rate in the 80s to 90s, afebrile.  94% on 3 

L of oxygen.  White blood cell count 15.0, hemoglobin 8.5, platelet count 190.  

Sodium 134, potassium 4.2, BUN 21, creatinine 0.7.  INR 1.4.








Objective





- Vital Signs


Vital signs: 


 Vital Signs











Temp  97.8 F   06/18/18 07:54


 


Pulse  83   06/18/18 09:17


 


Resp  19   06/18/18 07:54


 


BP  103/59   06/18/18 09:17


 


Pulse Ox  100   06/18/18 09:17








 Intake & Output











 06/17/18 06/18/18 06/18/18





 18:59 06:59 18:59


 


Intake Total 1010  360


 


Output Total 600 250 1


 


Balance 410 -250 359


 


Weight  76.8 kg 


 


Intake:   


 


  Oral 1010  360


 


Output:   


 


  Urine 600 250 


 


  Stool   1


 


Other:   


 


  Voiding Method   Bedside Commode





   Urinal


 


  # Voids 1 1 


 


  # Bowel Movements 1  








 ABP, PAP, CO, CI - Last Documented











Arterial Blood Pressure        133/59


 


Pulmonary Artery Pressure      25/8


 


Cardiac Output                 5.5


 


Cardiac Index                  3.0

















- Exam


PHYSICAL EXAMINATION: 





GENERAL: 78-year-old  gentleman in no apparent distress at the time of 

my examination





HEENT: Head is atraumatic, normocephalic.  Pupils equal, round.  Sclera 

anicteric. Conjunctiva are clear.  Mucous membranes of the mouth are moist.  

Neck is supple.  There is no elevated jugular venous pressure.]  bruit is heard.





HEART EXAMINATION: Heart S1 and S2 irregularly irregular.  Heart had are in 

place.





CHEST EXAMINATION: Lungs reveal scattered coarse rhonchi bilaterally.  Reaching 

1500 on his incentive spirometry.





ABDOMEN:  Soft, nontender. Bowel sounds are heard. No organomegaly noted.


 


EXTREMITIES: 2+ peripheral pulses with no evidence of peripheral edema and no 

calf tenderness noted.





NEUROLOGIC patient is awake, alert and oriented -3.


 


.


 











- Labs


CBC & Chem 7: 


 06/18/18 05:42





 06/18/18 05:42


Labs: 


 Abnormal Lab Results - Last 24 Hours (Table)











  06/17/18 06/17/18 06/17/18 Range/Units





  11:30 16:32 21:11 


 


WBC     (3.8-10.6)  k/uL


 


RBC     (4.30-5.90)  m/uL


 


Hgb     (13.0-17.5)  gm/dL


 


Hct     (39.0-53.0)  %


 


RDW     (11.5-15.5)  %


 


PT     (9.0-12.0)  sec


 


INR     (<1.2)  


 


Sodium     (137-145)  mmol/L


 


Chloride     ()  mmol/L


 


Carbon Dioxide     (22-30)  mmol/L


 


BUN     (9-20)  mg/dL


 


POC Glucose (mg/dL)  116 H  212 H  176 H  (75-99)  mg/dL


 


Calcium     (8.4-10.2)  mg/dL


 


Total Protein     (6.3-8.2)  g/dL


 


Albumin     (3.5-5.0)  g/dL














  06/18/18 06/18/18 06/18/18 Range/Units





  02:05 05:42 05:42 


 


WBC   15.0 H   (3.8-10.6)  k/uL


 


RBC   2.93 L   (4.30-5.90)  m/uL


 


Hgb   8.5 L   (13.0-17.5)  gm/dL


 


Hct   26.4 L   (39.0-53.0)  %


 


RDW   18.8 H   (11.5-15.5)  %


 


PT     (9.0-12.0)  sec


 


INR     (<1.2)  


 


Sodium    134 L  (137-145)  mmol/L


 


Chloride    97 L  ()  mmol/L


 


Carbon Dioxide    32 H  (22-30)  mmol/L


 


BUN    21 H  (9-20)  mg/dL


 


POC Glucose (mg/dL)  153 H    (75-99)  mg/dL


 


Calcium    8.2 L  (8.4-10.2)  mg/dL


 


Total Protein    5.1 L  (6.3-8.2)  g/dL


 


Albumin    3.1 L  (3.5-5.0)  g/dL














  06/18/18 06/18/18 Range/Units





  05:42 05:47 


 


WBC    (3.8-10.6)  k/uL


 


RBC    (4.30-5.90)  m/uL


 


Hgb    (13.0-17.5)  gm/dL


 


Hct    (39.0-53.0)  %


 


RDW    (11.5-15.5)  %


 


PT  12.9 H   (9.0-12.0)  sec


 


INR  1.4 H   (<1.2)  


 


Sodium    (137-145)  mmol/L


 


Chloride    ()  mmol/L


 


Carbon Dioxide    (22-30)  mmol/L


 


BUN    (9-20)  mg/dL


 


POC Glucose (mg/dL)   105 H  (75-99)  mg/dL


 


Calcium    (8.4-10.2)  mg/dL


 


Total Protein    (6.3-8.2)  g/dL


 


Albumin    (3.5-5.0)  g/dL














Assessment and Plan


Plan: 


Assessment and plan


#1 non-Q wave myocardial infarction status post coronary artery bypass grafting 

surgery


#2 COPD


#3 chronic persistent atrial fibrillation


#4 hypertension


# 5 diabetes


#6 hyperlipidemia


#7 history of prior pacer implantation


#8 history of nicotine dependence


#9 peripheral vascular disease


#10 H IT








Plan


From cardiology's perspective, we will continue the patient on his current 

medications.  Arrangements are being made for possible transferred to rehab 

today.





DNP note has been reviewed, I agree with a documented findings and plan of 

care.  Patient was seen and examined.

## 2018-06-19 NOTE — P.VSCSTY
Greater Saphenous Vein Mapping





This is bilateral lower extremity greater saphenous vein mapping.





Date of service 06/9/2018





Vein quality and ultrasound appearance no obvious wall changes or intraluminal 

thrombus.





Vein size  groin right 7.0 x 7.7 groin left 4.6 x 4.8





                High thigh right 4.0 x 5.2 high thigh left 4.6 x 5.1





                Mid thigh right 4.2 x 5.9 mid thigh left 3.8 x 5.3





                 Above-knee right 3.9 x 6.1 above-knee left 3.3 x 3.6





                  Below knee right 3.0 x 4.7 below-knee left 2.0 x 2.4





                   Mid calf right 2.0 x 3.3 mid calf left 1.3 x 2.4





                    Ankle right 2.1 x 4.6 ankle left 1.3 x 3.2





Impression usable bilateral greater saphenous vein.  Left leg at the knee and 

below appears a bit small.  Greater saphenous veins appear usable on the left 

and a bit small but possibly usable on the right..

## 2018-06-19 NOTE — CDI
Documentation Clarification Form



Date: 6/19/2018 12:00:00 AM

From: Marylee Wilson, RHIT; Sravanthi Desouza 

Phone: If you have a question about this query, please contact Sravanthi Desouza 
 at [phone] between 8am and 5pm.

MRN: [id number]

Admit Date: 6/8/2018 2:25:00 PM

Patient Name: Erlin Boyle

Visit Number: [id number]

Discharge Date:  6/18/2018



ATTENTION: The Clinical Documentation Specialists (CDI) and [facility] Coding Staff 
appreciate your assistance in clarifying documentation. Please respond to the 
clarification below the line at the bottom and electronically sign. The CDI & 
[facility] Coding staff will review the response and follow-up if needed. Please note: 
Queries are made part of the Legal Health Record. If you have any questions, 
please contact the author of this message via ITS.



Dr. Wolfe,



This patient presented for CABG.  Sputum cultures were positive for MSSA and 
note dated 6/17 states purulent tracheobronchitis as bronchopneumonia.  The 
discharge summary does not carry this diagnosis, just mentions the positive 
sputum cultures.   



In your professional opinion, please clarify if?



   Bronchopneumonia ruled in

   Bronchopneumonia ruled out

   Other, please specify _______

   Unable to determine



__________________________________________________________________



This represents pre-op colonization and not post-op complication







MTDD

## 2018-07-13 ENCOUNTER — HOSPITAL ENCOUNTER (OUTPATIENT)
Dept: HOSPITAL 47 - RADUSWWP | Age: 78
Discharge: HOME | End: 2018-07-13
Attending: INTERNAL MEDICINE
Payer: MEDICARE

## 2018-07-13 DIAGNOSIS — J98.11: ICD-10-CM

## 2018-07-13 DIAGNOSIS — J44.9: ICD-10-CM

## 2018-07-13 DIAGNOSIS — J90: Primary | ICD-10-CM

## 2018-07-13 PROCEDURE — 76604 US EXAM CHEST: CPT

## 2018-07-13 NOTE — US
EXAMINATION TYPE: US chest

 

DATE OF EXAM: 7/13/2018

 

COMPARISON: Chest X ray

 

CLINICAL HISTORY: J44.9 CHR OBSTRUCTIVE LUNG DISEASE; Post CABG

 

EXAM MEASUREMENTS:

 

Right Pleural Effusion fluid pocket:  no free fluid seen 

Left Pleural Effusion fluid pocket:  10.1 cm A/P Sagittal

**  Left skin to fluid thickness:  2.5 cm A/P 

 

Left side was marked for possible thoracentesis outside the dept.

 

Pulmonologists are able to review the images in the patient?s EMR.  

 

 

IMPRESSIONS: Left-sided pleural effusion appears overall small to moderate degree with surrounding at
electatic lung.

## 2018-07-17 ENCOUNTER — HOSPITAL ENCOUNTER (INPATIENT)
Dept: HOSPITAL 47 - EC | Age: 78
LOS: 7 days | Discharge: NURSING, REHAB OR LONG-TERM CARE | DRG: 193 | End: 2018-07-24
Attending: INTERNAL MEDICINE | Admitting: INTERNAL MEDICINE
Payer: MEDICARE

## 2018-07-17 DIAGNOSIS — E11.40: ICD-10-CM

## 2018-07-17 DIAGNOSIS — Z87.891: ICD-10-CM

## 2018-07-17 DIAGNOSIS — I25.5: ICD-10-CM

## 2018-07-17 DIAGNOSIS — J98.11: ICD-10-CM

## 2018-07-17 DIAGNOSIS — Z79.899: ICD-10-CM

## 2018-07-17 DIAGNOSIS — Z88.8: ICD-10-CM

## 2018-07-17 DIAGNOSIS — R40.2252: ICD-10-CM

## 2018-07-17 DIAGNOSIS — Z79.02: ICD-10-CM

## 2018-07-17 DIAGNOSIS — M20.62: ICD-10-CM

## 2018-07-17 DIAGNOSIS — M47.816: ICD-10-CM

## 2018-07-17 DIAGNOSIS — I11.0: ICD-10-CM

## 2018-07-17 DIAGNOSIS — M51.36: ICD-10-CM

## 2018-07-17 DIAGNOSIS — Z86.718: ICD-10-CM

## 2018-07-17 DIAGNOSIS — J96.21: ICD-10-CM

## 2018-07-17 DIAGNOSIS — Z96.652: ICD-10-CM

## 2018-07-17 DIAGNOSIS — I25.10: ICD-10-CM

## 2018-07-17 DIAGNOSIS — K21.9: ICD-10-CM

## 2018-07-17 DIAGNOSIS — R40.2362: ICD-10-CM

## 2018-07-17 DIAGNOSIS — I50.23: ICD-10-CM

## 2018-07-17 DIAGNOSIS — Z79.01: ICD-10-CM

## 2018-07-17 DIAGNOSIS — N40.0: ICD-10-CM

## 2018-07-17 DIAGNOSIS — E78.5: ICD-10-CM

## 2018-07-17 DIAGNOSIS — Z90.3: ICD-10-CM

## 2018-07-17 DIAGNOSIS — Z79.82: ICD-10-CM

## 2018-07-17 DIAGNOSIS — Z95.1: ICD-10-CM

## 2018-07-17 DIAGNOSIS — Z87.01: ICD-10-CM

## 2018-07-17 DIAGNOSIS — Z86.14: ICD-10-CM

## 2018-07-17 DIAGNOSIS — J44.0: ICD-10-CM

## 2018-07-17 DIAGNOSIS — Z95.0: ICD-10-CM

## 2018-07-17 DIAGNOSIS — Z90.49: ICD-10-CM

## 2018-07-17 DIAGNOSIS — Z80.9: ICD-10-CM

## 2018-07-17 DIAGNOSIS — I65.23: ICD-10-CM

## 2018-07-17 DIAGNOSIS — Z98.42: ICD-10-CM

## 2018-07-17 DIAGNOSIS — R40.2142: ICD-10-CM

## 2018-07-17 DIAGNOSIS — I25.2: ICD-10-CM

## 2018-07-17 DIAGNOSIS — J18.9: Primary | ICD-10-CM

## 2018-07-17 DIAGNOSIS — M20.61: ICD-10-CM

## 2018-07-17 DIAGNOSIS — Z79.4: ICD-10-CM

## 2018-07-17 DIAGNOSIS — Z99.81: ICD-10-CM
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
## 2018-07-17 DIAGNOSIS — Z82.49: ICD-10-CM

## 2018-07-17 DIAGNOSIS — M20.001: ICD-10-CM

## 2018-07-17 DIAGNOSIS — E11.51: ICD-10-CM

## 2018-07-17 DIAGNOSIS — Z87.11: ICD-10-CM

## 2018-07-17 DIAGNOSIS — G47.33: ICD-10-CM

## 2018-07-17 DIAGNOSIS — Z98.41: ICD-10-CM

## 2018-07-17 DIAGNOSIS — Z79.51: ICD-10-CM

## 2018-07-17 DIAGNOSIS — J44.1: ICD-10-CM

## 2018-07-17 DIAGNOSIS — Z80.0: ICD-10-CM

## 2018-07-17 DIAGNOSIS — I48.2: ICD-10-CM

## 2018-07-17 DIAGNOSIS — I47.2: ICD-10-CM

## 2018-07-17 DIAGNOSIS — M19.91: ICD-10-CM

## 2018-07-17 LAB
ALBUMIN SERPL-MCNC: 3.5 G/DL (ref 3.5–5)
ALP SERPL-CCNC: 101 U/L (ref 38–126)
ALT SERPL-CCNC: 36 U/L (ref 21–72)
ANION GAP SERPL CALC-SCNC: 10 MMOL/L
APTT BLD: 22.2 SEC (ref 22–30)
AST SERPL-CCNC: 29 U/L (ref 17–59)
BUN SERPL-SCNC: 31 MG/DL (ref 9–20)
CALCIUM SPEC-MCNC: 9.2 MG/DL (ref 8.4–10.2)
CELLS COUNTED: 200
CHLORIDE SERPL-SCNC: 96 MMOL/L (ref 98–107)
CK SERPL-CCNC: 28 U/L (ref 55–170)
CO2 SERPL-SCNC: 31 MMOL/L (ref 22–30)
D DIMER PPP FEU-MCNC: 2.71 MG/L FEU (ref ?–0.6)
ERYTHROCYTE [DISTWIDTH] IN BLOOD BY AUTOMATED COUNT: 4.86 M/UL (ref 4.3–5.9)
ERYTHROCYTE [DISTWIDTH] IN BLOOD: 16.2 % (ref 11.5–15.5)
GLUCOSE BLD-MCNC: 168 MG/DL (ref 75–99)
GLUCOSE BLD-MCNC: 195 MG/DL (ref 75–99)
GLUCOSE BLD-MCNC: 237 MG/DL (ref 75–99)
GLUCOSE SERPL-MCNC: 112 MG/DL (ref 74–99)
HBA1C MFR BLD: 4.7 % (ref 4–6)
HCT VFR BLD AUTO: 42.4 % (ref 39–53)
HGB BLD-MCNC: 13.3 GM/DL (ref 13–17.5)
INR PPP: 1.3 (ref ?–1.2)
LYMPHOCYTES # BLD MANUAL: 1.77 K/UL (ref 1–4.8)
MCH RBC QN AUTO: 27.3 PG (ref 25–35)
MCHC RBC AUTO-ENTMCNC: 31.3 G/DL (ref 31–37)
MCV RBC AUTO: 87.2 FL (ref 80–100)
MONOCYTES # BLD MANUAL: 1.06 K/UL (ref 0–1)
NEUTROPHILS NFR BLD MANUAL: 62 %
NEUTS SEG # BLD MANUAL: 15 K/UL (ref 1.3–7.7)
PLATELET # BLD AUTO: 326 K/UL (ref 150–450)
POTASSIUM SERPL-SCNC: 4.4 MMOL/L (ref 3.5–5.1)
PROT SERPL-MCNC: 6.2 G/DL (ref 6.3–8.2)
PT BLD: 12.1 SEC (ref 9–12)
SODIUM SERPL-SCNC: 137 MMOL/L (ref 137–145)
TROPONIN I SERPL-MCNC: 0.03 NG/ML (ref 0–0.03)
WBC # BLD AUTO: 17.7 K/UL (ref 3.8–10.6)

## 2018-07-17 PROCEDURE — 85730 THROMBOPLASTIN TIME PARTIAL: CPT

## 2018-07-17 PROCEDURE — 84484 ASSAY OF TROPONIN QUANT: CPT

## 2018-07-17 PROCEDURE — 94640 AIRWAY INHALATION TREATMENT: CPT

## 2018-07-17 PROCEDURE — 96365 THER/PROPH/DIAG IV INF INIT: CPT

## 2018-07-17 PROCEDURE — 83615 LACTATE (LD) (LDH) ENZYME: CPT

## 2018-07-17 PROCEDURE — 85379 FIBRIN DEGRADATION QUANT: CPT

## 2018-07-17 PROCEDURE — 76604 US EXAM CHEST: CPT

## 2018-07-17 PROCEDURE — 96375 TX/PRO/DX INJ NEW DRUG ADDON: CPT

## 2018-07-17 PROCEDURE — 93005 ELECTROCARDIOGRAM TRACING: CPT

## 2018-07-17 PROCEDURE — 71275 CT ANGIOGRAPHY CHEST: CPT

## 2018-07-17 PROCEDURE — 96376 TX/PRO/DX INJ SAME DRUG ADON: CPT

## 2018-07-17 PROCEDURE — 83036 HEMOGLOBIN GLYCOSYLATED A1C: CPT

## 2018-07-17 PROCEDURE — 88305 TISSUE EXAM BY PATHOLOGIST: CPT

## 2018-07-17 PROCEDURE — 71046 X-RAY EXAM CHEST 2 VIEWS: CPT

## 2018-07-17 PROCEDURE — 89050 BODY FLUID CELL COUNT: CPT

## 2018-07-17 PROCEDURE — 96366 THER/PROPH/DIAG IV INF ADDON: CPT

## 2018-07-17 PROCEDURE — 80048 BASIC METABOLIC PNL TOTAL CA: CPT

## 2018-07-17 PROCEDURE — 87205 SMEAR GRAM STAIN: CPT

## 2018-07-17 PROCEDURE — 83880 ASSAY OF NATRIURETIC PEPTIDE: CPT

## 2018-07-17 PROCEDURE — 71045 X-RAY EXAM CHEST 1 VIEW: CPT

## 2018-07-17 PROCEDURE — 87040 BLOOD CULTURE FOR BACTERIA: CPT

## 2018-07-17 PROCEDURE — 93308 TTE F-UP OR LMTD: CPT

## 2018-07-17 PROCEDURE — 85610 PROTHROMBIN TIME: CPT

## 2018-07-17 PROCEDURE — 82553 CREATINE MB FRACTION: CPT

## 2018-07-17 PROCEDURE — 83735 ASSAY OF MAGNESIUM: CPT

## 2018-07-17 PROCEDURE — 83605 ASSAY OF LACTIC ACID: CPT

## 2018-07-17 PROCEDURE — 88108 CYTOPATH CONCENTRATE TECH: CPT

## 2018-07-17 PROCEDURE — 82550 ASSAY OF CK (CPK): CPT

## 2018-07-17 PROCEDURE — 36415 COLL VENOUS BLD VENIPUNCTURE: CPT

## 2018-07-17 PROCEDURE — 84157 ASSAY OF PROTEIN OTHER: CPT

## 2018-07-17 PROCEDURE — 85025 COMPLETE CBC W/AUTO DIFF WBC: CPT

## 2018-07-17 PROCEDURE — 99285 EMERGENCY DEPT VISIT HI MDM: CPT

## 2018-07-17 PROCEDURE — 94760 N-INVAS EAR/PLS OXIMETRY 1: CPT

## 2018-07-17 PROCEDURE — 87075 CULTR BACTERIA EXCEPT BLOOD: CPT

## 2018-07-17 PROCEDURE — 82945 GLUCOSE OTHER FLUID: CPT

## 2018-07-17 PROCEDURE — 87070 CULTURE OTHR SPECIMN AEROBIC: CPT

## 2018-07-17 PROCEDURE — 80053 COMPREHEN METABOLIC PANEL: CPT

## 2018-07-17 PROCEDURE — 32555 ASPIRATE PLEURA W/ IMAGING: CPT

## 2018-07-17 RX ADMIN — METOPROLOL SUCCINATE SCH MG: 50 TABLET, EXTENDED RELEASE ORAL at 08:12

## 2018-07-17 RX ADMIN — HEPARIN SODIUM SCH: 5000 INJECTION, SOLUTION INTRAVENOUS; SUBCUTANEOUS at 16:25

## 2018-07-17 RX ADMIN — Medication SCH MG: at 23:00

## 2018-07-17 RX ADMIN — TAMSULOSIN HYDROCHLORIDE SCH MG: 0.4 CAPSULE ORAL at 22:27

## 2018-07-17 RX ADMIN — LEVOFLOXACIN SCH MLS/HR: 750 INJECTION, SOLUTION INTRAVENOUS at 07:58

## 2018-07-17 RX ADMIN — INSULIN ASPART SCH UNIT: 100 INJECTION, SOLUTION INTRAVENOUS; SUBCUTANEOUS at 13:03

## 2018-07-17 RX ADMIN — INSULIN ASPART SCH UNIT: 100 INJECTION, SOLUTION INTRAVENOUS; SUBCUTANEOUS at 18:11

## 2018-07-17 RX ADMIN — DOCUSATE SODIUM SCH MG: 100 CAPSULE, LIQUID FILLED ORAL at 08:11

## 2018-07-17 RX ADMIN — ASPIRIN 325 MG ORAL TABLET SCH MG: 325 PILL ORAL at 18:15

## 2018-07-17 RX ADMIN — IPRATROPIUM BROMIDE AND ALBUTEROL SULFATE SCH ML: .5; 3 SOLUTION RESPIRATORY (INHALATION) at 09:44

## 2018-07-17 RX ADMIN — METHYLPREDNISOLONE SODIUM SUCCINATE SCH MG: 125 INJECTION, POWDER, FOR SOLUTION INTRAMUSCULAR; INTRAVENOUS at 18:15

## 2018-07-17 RX ADMIN — GLIMEPIRIDE SCH MG: 1 TABLET ORAL at 08:09

## 2018-07-17 RX ADMIN — INSULIN ASPART SCH UNIT: 100 INJECTION, SOLUTION INTRAVENOUS; SUBCUTANEOUS at 22:28

## 2018-07-17 RX ADMIN — BUDESONIDE SCH: 1 SUSPENSION RESPIRATORY (INHALATION) at 09:50

## 2018-07-17 RX ADMIN — GABAPENTIN SCH MG: 100 CAPSULE ORAL at 08:12

## 2018-07-17 RX ADMIN — BUDESONIDE SCH: 1 SUSPENSION RESPIRATORY (INHALATION) at 20:47

## 2018-07-17 RX ADMIN — IPRATROPIUM BROMIDE AND ALBUTEROL SULFATE SCH ML: .5; 3 SOLUTION RESPIRATORY (INHALATION) at 12:57

## 2018-07-17 RX ADMIN — DOCUSATE SODIUM SCH MG: 100 CAPSULE, LIQUID FILLED ORAL at 22:19

## 2018-07-17 RX ADMIN — IPRATROPIUM BROMIDE AND ALBUTEROL SULFATE SCH ML: .5; 3 SOLUTION RESPIRATORY (INHALATION) at 20:48

## 2018-07-17 RX ADMIN — IPRATROPIUM BROMIDE AND ALBUTEROL SULFATE SCH ML: .5; 3 SOLUTION RESPIRATORY (INHALATION) at 15:43

## 2018-07-17 RX ADMIN — GABAPENTIN SCH MG: 100 CAPSULE ORAL at 22:19

## 2018-07-17 RX ADMIN — FUROSEMIDE SCH MG: 40 TABLET ORAL at 08:11

## 2018-07-17 RX ADMIN — PANTOPRAZOLE SODIUM SCH MG: 40 TABLET, DELAYED RELEASE ORAL at 08:11

## 2018-07-17 RX ADMIN — HEPARIN SODIUM SCH UNIT: 5000 INJECTION, SOLUTION INTRAVENOUS; SUBCUTANEOUS at 22:19

## 2018-07-17 RX ADMIN — METHYLPREDNISOLONE SODIUM SUCCINATE SCH MG: 125 INJECTION, POWDER, FOR SOLUTION INTRAMUSCULAR; INTRAVENOUS at 12:22

## 2018-07-17 RX ADMIN — DEXTROSE MONOHYDRATE SCH MLS/HR: 5 INJECTION, SOLUTION INTRAVENOUS at 12:04

## 2018-07-17 RX ADMIN — METOPROLOL SUCCINATE SCH MG: 50 TABLET, EXTENDED RELEASE ORAL at 23:00

## 2018-07-17 RX ADMIN — FORMOTEROL FUMARATE DIHYDRATE SCH: 20 SOLUTION RESPIRATORY (INHALATION) at 20:47

## 2018-07-17 RX ADMIN — ATORVASTATIN CALCIUM SCH MG: 40 TABLET, FILM COATED ORAL at 22:19

## 2018-07-17 RX ADMIN — FORMOTEROL FUMARATE DIHYDRATE SCH: 20 SOLUTION RESPIRATORY (INHALATION) at 09:50

## 2018-07-17 NOTE — P.GSCN
History of Present Illness


Consult date: 18


Reason for Consult: 





Recent coronary artery bypass grafting surgery.


Requesting physician: Reuben York


History of present illness: 





This is a 78-year-old gentleman who follows with Dr. Vang on an outpatient 

basis.  His has a previous medical history of triple-vessel coronary artery 

disease with left main disease and non-ST elevation myocardial infarction 

status post urgent quadruple coronary artery bypass grafting as well as 

exclusion of the left atrial appendage on 2018, mild to moderate left 

ventricular dysfunction, chronic systolic heart failure with ejection fraction 

40-45% as well as diastolic dysfunction, chronic persistent atrial fibrillation 

on home Coumadin therapy, sick sinus syndrome status post permanent pacemaker 

insertion, hypertension, hyperlipidemia, carotid stenosis 50-79% bilaterally, 

previous tobacco dependence, severe COPD with FEV1 45% of predicted, arthritis, 

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 

amputations, type 2 diabetes mellitus with previous hemoglobin A1c 5.9%, 

peripheral vascular disease, pneumonia, family history of early onset coronary 

artery disease, and previous sputum culture positive for pseudomonas and MSSA.  

He was recovering and rehabilitating at [facility], and did see Dr. Wolfe 

from cardiothoracic surgery last Friday, 2018 in the office with no new 

complaints.  He was also being followed by Dr. Del Cid on an outpatient basis, 

chest x-ray was completed on  which demonstrated small to moderate left 

pleural effusion.  Ultrasound of the chest was completed demonstrating 10.1 cm 

fluid pocket on the left, and the patient was marked for thoracentesis, which 

was to be completed later today by Dr. Del Cid as an outpatient.  In the last 24 

hours, however the patient developed increased shortness of breath and was 

coughing up blood tinged sputum.  He had conversational dyspnea, and nasal 

flaring, as well as requirements for higher oxygen at [facility].  The decision 

was made to transport the patient to [facility] for further 

evaluation and treatment.  Chest CTA completed in the emergency room 

demonstrated no evidence of pulmonary embolism, consolidation in the right 

lower lobe, and left pleural effusion.  The patient was admitted to Dr. Vang for IV antibiotics and treatment of his left pleural effusion.  Dr. Wolfe from cardiothoracic surgery was consulted as this patient is well-known 

to us and was previously admitted for his open heart surgery.





Review of Systems





Review of systems was completed and was negative except as noted.





- Constitutional


Reports fatigue





- Cardiovascular


Reports as per HPI, Reports dyspnea on exertion, Reports irregular heart beat, 

Reports shortness of breath





- Respiratory


Reports as per HPI, Reports cough with sputum





Past Medical History


Past Medical History: Atrial Fibrillation, Coronary Artery Disease (CAD), Chest 

Pain / Angina, Heart Failure, COPD, Diabetes Mellitus, Deep Vein Thrombosis (DVT

), Eye Disorder, GERD/Reflux, GI Bleed, Hyperlipidemia, Hypertension, 

Myocardial Infarction (non Q-wave), Osteoarthritis (OA), Pneumonia, Prostate 

Disorder, Respiratory Disorder, Sleep Apnea/CPAP/BIPAP, Vascular Disorder


Additional Past Medical History / Comment(s): pt is rt side dominant. lumbar 

disc disease, history of cpap for obstructive sleep apnea-no longer. pt stated 

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 "pt stated may 

have had a mi or tia they were'nt sure".  History of sick sinus syndrome.  

Bilateral carotid stenosis


History of Any Multi-Drug Resistant Organisms: MRSA


Year Discovered:: 2018


MDRO Source:: Sputum culture positive for pseudomonas and MSSA


Past Surgical History: Appendectomy, Back Surgery, Cholecystectomy, Coronary 

Bypass/CABG, Heart Catheterization, Orthopedic Surgery, Pacemaker


Additional Past Surgical History / Comment(s): andrea cataracts, had part of 

stomach removed and a foot of bowel d/t bleeding peptic ulcers. total lt knee 

replacment,several sx on andrea feet had 4th toe each foot removed.rt hand 2nd 

finger partial amp and 3 rd finger amputated, CABG x 4 with left atrial 

appendage ligation in 2018


Past Anesthesia/Blood Transfusion Reactions: No Reported Reaction


Additional Past Anesthesia/Blood Transfusion Reaction / Comm: blood transfusion

- no reaction


Type of Cardiac Device: Permanent Pacemaker


Device Placement Date:: un


Past Psychological History: No Psychological Hx Reported


Smoking Status: Former smoker


Past Alcohol Use History: None Reported


Past Drug Use History: None Reported





- Past Family History


  ** Mother


Family Medical History: Cancer


Additional Family Medical History / Comment(s):  age 52





  ** Father


Family Medical History: Cancer


Additional Family Medical History / Comment(s): rectal cancer-  age 75





  ** Brother(s)


Family Medical History: Coronary Artery Disease (CAD), Myocardial Infarction (MI

)


Additional Family Medical History / Comment(s): History of myocardial 

infarction at age 46.





Medications and Allergies


 Home Medications











 Medication  Instructions  Recorded  Confirmed  Type


 


Gabapentin [Neurontin] 100 mg PO TID@0600,1400,2100 18 History


 


Ipratropium-Albuterol Nebulize 3 ml INHALATION RT-QID 18 History





[Duoneb 0.5 mg-3 mg/3 ml Soln]    


 


Tamsulosin HCl [Flomax] 0.4 mg PO DAILY@0800 18 History


 


Acetaminophen Tab [Tylenol] 500 mg PO Q4HR PRN  tab 18 Rx


 


Budesonide-Formot 160-4.5 Mcg 2 puff INHALATION RT-BID  puff 18 

Rx





[Symbicort 160-4.5 Mcg Inhaler]    


 


Ipratropium-Albuterol Nebulize 3 ml INHALATION RT-Q2H PRN 18 Rx





[Duoneb 0.5 mg-3 mg/3 ml Soln] ampul.neb   


 


Lisinopril [Zestril] 2.5 mg PO DAILY@1200  tab 18 Rx


 


Magnesium Hydroxide [Milk of 2,400 mg PO BID PRN  ml 18 Rx





Magnesia Concentrate]    


 


Metoprolol Succinate (ER) [Toprol 50 mg PO BID  tab.er.24h 18 Rx





XL]    


 


Aspirin 325 mg PO DAILY@1700 18 History


 


Atorvastatin [Lipitor] 40 mg PO HS@2100 18 History


 


Bisacodyl [Dulcolax] 10 mg RECTAL DAILY PRN 18 History


 


Docusate [Colace] 100 mg PO BID@0800,2100 18 History


 


Furosemide [Lasix] 40 mg PO DAILY@0600 18 History


 


Glimepiride [Amaryl] 0.5 mg PO AC-BRKFST 18 History


 


Glucerna Shake 1 can PO BID@0800,1700 18 History


 


Insulin Aspart [NovoLOG See Protocol SQ ACHS 18 History





(formulary)]    


 


Melatonin 2 mg PO HS@18 History


 


Menthol/Zinc Oxide [Calmoseptine 1 applic TOPICAL BID@0800,18 History





Ointment]    


 


Na Phos,M-B/Na Phos,Di-Ba [Fleet 133 ml RECTAL DAILY PRN 18 

History





Adult]    


 


Pantoprazole [Protonix] 40 mg PO DAILY@0600 18 History


 


Polyethylene Glycol 3350 [Miralax] 17 gm PO DAILY@0800 18 History


 


Warfarin [Coumadin] 5 mg PO DAILY@1700 18 History


 


guaiFENesin [Mucinex] 600 mg PO BID@0800,2100 18 History


 


predniSONE 10 mg PO DAILY@0800 18 History











 Allergies











Allergy/AdvReac Type Severity Reaction Status Date / Time


 


inhaler AdvReac  Rapid Uncoded 18 15:04





   Heart Rate  


 


PACEMAKER AdvReac  Unknown Uncoded 18 06:38














Surgical - Exam


 Vital Signs











Temp Pulse Resp BP Pulse Ox


 


 98.1 F   112 H  20   116/69   97 


 


 18 02:13  18 02:13  18 02:13  18 02:13  18 02:13














- General


well developed, well nourished, no distress, no pain, chronically ill





- Eyes


PERRL, normal ocular movement





- ENT


no hearing loss





- Neck


no masses, no bruits, trachea midline





- Respiratory





Lungs sounds diminished and coarse bilaterally.  Respirations even, nonlabored.

  Currently on 5 L nasal cannula with oxygen saturation 98%.





- Cardiovascular





S1, S2 present.  Irregular rate and rhythm, atrial fibrillation with occasional 

PVCs on telemetry.  Sternum stable.  Palpable peripheral pulses bilaterally.  

Trace bilateral lower extremity edema present.  No calf pain or tenderness 

noted.  Bilateral lower extremities Ace wrapped.





- Abdomen


Abdomen: soft, non tender, bowel sounds





- Genitourinary





Deferred





- Rectum





Deferred





- Integumentary





Skin is warm and dry.  Sternal incision well approximated and well-healed.  

Right lower extremity EVH site with dressing in place.





- Neurologic


normal coordination, normal sensation





- Psychiatric


oriented to time, oriented to person, oriented to place, speech is normal, 

memory intact





Results





- Labs





 18 02:00





 18 03:31


 Abnormal Lab Results - Last 24 Hours (Table)











  18 Range/Units





  02:00 03:31 03:31 


 


WBC  17.7 H    (3.8-10.6)  k/uL


 


RDW  16.2 H    (11.5-15.5)  %


 


Neutrophils # (Manual)  15.00 H    (1.3-7.7)  k/uL


 


Monocytes # (Manual)  1.06 H    (0-1.0)  k/uL


 


PT   12.1 H   (9.0-12.0)  sec


 


INR   1.3 H   (<1.2)  


 


D-Dimer   2.71 H   (<0.60)  mg/L FEU


 


Chloride    96 L  ()  mmol/L


 


Carbon Dioxide    31 H  (22-30)  mmol/L


 


BUN    31 H  (9-20)  mg/dL


 


Glucose    112 H  (74-99)  mg/dL


 


Total Creatine Kinase     ()  U/L


 


Total Protein    6.2 L  (6.3-8.2)  g/dL














  18 Range/Units





  03:31 


 


WBC   (3.8-10.6)  k/uL


 


RDW   (11.5-15.5)  %


 


Neutrophils # (Manual)   (1.3-7.7)  k/uL


 


Monocytes # (Manual)   (0-1.0)  k/uL


 


PT   (9.0-12.0)  sec


 


INR   (<1.2)  


 


D-Dimer   (<0.60)  mg/L FEU


 


Chloride   ()  mmol/L


 


Carbon Dioxide   (22-30)  mmol/L


 


BUN   (9-20)  mg/dL


 


Glucose   (74-99)  mg/dL


 


Total Creatine Kinase  28 L  ()  U/L


 


Total Protein   (6.3-8.2)  g/dL








 Diabetes panel











  18 Range/Units





  03:31 


 


Sodium  137  (137-145)  mmol/L


 


Potassium  4.4  (3.5-5.1)  mmol/L


 


Chloride  96 L  ()  mmol/L


 


Carbon Dioxide  31 H  (22-30)  mmol/L


 


BUN  31 H  (9-20)  mg/dL


 


Creatinine  0.70  (0.66-1.25)  mg/dL


 


Glucose  112 H  (74-99)  mg/dL


 


Calcium  9.2  (8.4-10.2)  mg/dL


 


AST  29  (17-59)  U/L


 


ALT  36  (21-72)  U/L


 


Alkaline Phosphatase  101  ()  U/L


 


Total Protein  6.2 L  (6.3-8.2)  g/dL


 


Albumin  3.5  (3.5-5.0)  g/dL








 Calcium panel











  18 Range/Units





  03:31 


 


Calcium  9.2  (8.4-10.2)  mg/dL


 


Albumin  3.5  (3.5-5.0)  g/dL








 Pituitary panel











  18 Range/Units





  03:31 


 


Sodium  137  (137-145)  mmol/L


 


Potassium  4.4  (3.5-5.1)  mmol/L


 


Chloride  96 L  ()  mmol/L


 


Carbon Dioxide  31 H  (22-30)  mmol/L


 


BUN  31 H  (9-20)  mg/dL


 


Creatinine  0.70  (0.66-1.25)  mg/dL


 


Glucose  112 H  (74-99)  mg/dL


 


Calcium  9.2  (8.4-10.2)  mg/dL








 Adrenal panel











  18 Range/Units





  03:31 


 


Sodium  137  (137-145)  mmol/L


 


Potassium  4.4  (3.5-5.1)  mmol/L


 


Chloride  96 L  ()  mmol/L


 


Carbon Dioxide  31 H  (22-30)  mmol/L


 


BUN  31 H  (9-20)  mg/dL


 


Creatinine  0.70  (0.66-1.25)  mg/dL


 


Glucose  112 H  (74-99)  mg/dL


 


Calcium  9.2  (8.4-10.2)  mg/dL


 


Total Bilirubin  0.9  (0.2-1.3)  mg/dL


 


AST  29  (17-59)  U/L


 


ALT  36  (21-72)  U/L


 


Alkaline Phosphatase  101  ()  U/L


 


Total Protein  6.2 L  (6.3-8.2)  g/dL


 


Albumin  3.5  (3.5-5.0)  g/dL














- Imaging


CT scan - chest: report reviewed, image reviewed





Assessment and Plan


(1) Chronic systolic heart failure


Current Visit: Yes   Status: Chronic   Code(s): I50.22 - CHRONIC SYSTOLIC (

CONGESTIVE) HEART FAILURE   SNOMED Code(s): 219842479


   





(2) Pleural effusion


Current Visit: Yes   Status: Acute   Code(s): J90 - PLEURAL EFFUSION, NOT 

ELSEWHERE CLASSIFIED   SNOMED Code(s): 66508925


   





(3) Pneumonia


Current Visit: Yes   Status: Acute   Code(s): J18.9 - PNEUMONIA, UNSPECIFIED 

ORGANISM   SNOMED Code(s): 752951999


   





(4) Non-ST elevated myocardial infarction


Current Visit: No   Status: Resolved   Code(s): I21.4 - NON-ST ELEVATION (NSTEMI

) MYOCARDIAL INFARCTION   SNOMED Code(s): 705562760


   





(5) BPH (benign prostatic hyperplasia)


Current Visit: Yes   Status: Chronic   Code(s): N40.0 - BENIGN PROSTATIC 

HYPERPLASIA WITHOUT LOWER URINRY TRACT SYMP   SNOMED Code(s): 689010541


   





(6) CAD (coronary artery disease)


Current Visit: Yes   Status: Chronic   Code(s): I25.10 - ATHSCL HEART DISEASE 

OF NATIVE CORONARY ARTERY W/O ANG PCTRS   SNOMED Code(s): 88796854


   





(7) COPD (chronic obstructive pulmonary disease)


Current Visit: Yes   Status: Chronic   Code(s): J44.9 - CHRONIC OBSTRUCTIVE 

PULMONARY DISEASE, UNSPECIFIED   SNOMED Code(s): 95088319


   





(8) Chronic atrial fibrillation


Current Visit: Yes   Status: Chronic   Code(s): I48.2 - CHRONIC ATRIAL 

FIBRILLATION   SNOMED Code(s): 613634135


   





(9) Diabetes mellitus type 2 in nonobese


Current Visit: Yes   Status: Chronic   Code(s): E11.9 - TYPE 2 DIABETES 

MELLITUS WITHOUT COMPLICATIONS   SNOMED Code(s): 445938180


   





(10) Family history of early CAD


Current Visit: Yes   Status: Chronic   Code(s): Z82.49 - FAMILY HX OF ISCHEM 

HEART DIS AND OTH DIS OF THE CIRC SYS   SNOMED Code(s): 641024694


   





(11) GERD (gastroesophageal reflux disease)


Current Visit: Yes   Status: Chronic   Code(s): K21.9 - GASTRO-ESOPHAGEAL 

REFLUX DISEASE WITHOUT ESOPHAGITIS   SNOMED Code(s): 419061407


   





(12) History of Coumadin therapy


Current Visit: Yes   Status: Chronic   Code(s): Z92.29 - PERSONAL HISTORY OF 

OTHER DRUG THERAPY   SNOMED Code(s): 218456805


   





(13) History of permanent cardiac pacemaker placement


Current Visit: Yes   Status: Chronic   Code(s): Z95.0 - PRESENCE OF CARDIAC 

PACEMAKER   SNOMED Code(s): 782537666


   





(14) History of sick sinus syndrome


Current Visit: Yes   Status: Chronic   Code(s): Z86.79 - PERSONAL HISTORY OF 

OTHER DISEASES OF THE CIRCULATORY SYSTEM   SNOMED Code(s): 008619615535193


   





(15) Hypertension


Current Visit: Yes   Status: Chronic   Code(s): I10 - ESSENTIAL (PRIMARY) 

HYPERTENSION   SNOMED Code(s): 64804529


   





(16) On home oxygen therapy


Current Visit: Yes   Status: Chronic   Code(s): Z99.81 - DEPENDENCE ON 

SUPPLEMENTAL OXYGEN   SNOMED Code(s): 497819656518


   





(17) Peripheral vascular disease


Current Visit: Yes   Status: Chronic   Code(s): I73.9 - PERIPHERAL VASCULAR 

DISEASE, UNSPECIFIED   SNOMED Code(s): 861911543


   





(18) History of MRSA infection


Current Visit: No   Status: Resolved   Code(s): Z86.14 - PERSONAL HISTORY OF 

METHICILLIN RESIS STAPH INFECTION   SNOMED Code(s): 288899441


   





(19) History of smoking


Current Visit: No   Status: Resolved   Code(s): Z87.891 - PERSONAL HISTORY OF 

NICOTINE DEPENDENCE   SNOMED Code(s): 89481465963282001


   





(20) History of coronary artery bypass graft


Current Visit: Yes   Status: Chronic   Code(s): Z95.1 - PRESENCE OF 

AORTOCORONARY BYPASS GRAFT   SNOMED Code(s): 679238107


   


Plan: 





1.  Continue aspirin, statin, beta blocker.  Will increase beta blocker therapy 

as tolerated.


2.  Hold Coumadin for now in anticipation of thoracentesis by Dr. Del Cid.  

Heparin subcu added in for DVT prophylaxis.


3.  Wean O2 as tolerated.  Will order and encourage incentive spirometry use 10 

times every hour.


4.  Bronchodilators, steroids per Dr. Del Cid.


5.  Continue antibiotics.  It


6.  Will monitor daily labs and x-rays.


7.  Increase activity, ambulate as tolerated.  Will order PT/OT for evaluation.


8.  Protonix for GI prophylaxis.


9.  Insulin, other medical comorbidities per Dr. rGiffiths.


10.  More recommendations to follow.





Thank you for this consult.  We look forward to working with you in the care of 

your patient.


Time with Patient: Greater than 30

## 2018-07-17 NOTE — P.HPIM
History of Present Illness


Chief complaint


Shortness of breath





History of present illness


The patient was transferred from [facility] yesterday to the emergency room.

  I received a call at 1:00 this morning from [facility] nursing the patient 

was coughing up frothy hemorrhagic sputum and was short of breath and dropping 

his O2 saturation into the 80s.  The patient was transferred here to the 

emergency room.  He denied any fever or chills but has had some continuing 

coughing.  Recurrent shortness of breath but worse the past evening.  Patient 

is status post coronary artery bypass last month.





Past medical history


Coronary artery disease with recent coronary bypass with a symptomatic triple-

vessel disease and non-ST segment elevated myocardial infarction.


History of chronic systolic congestive heart failure, ejection fraction of 40-45

%


Chronic atrial fibrillation


History of sick sinus syndrome with previous pacemaker placement.


Hypertension


Hyperlipidemia


History of carotid vascular disease


Previous tobacco usage


COPD with FEV1 of 45%


Diffuse degenerative arthritis, more specific lumbar disc disease.


BPH


Gastroesophageal reflux


Recurrent ulcerations of the feet and fingers requiring amputations in the past.


Type 2 diabetes


Peripheral vascular disease





Previous surgeries include the coronary artery bypass


Appendectomy


Cholecystectomy


Lumbar surgery


Previous amputations of fingers and toes.


Total left knee replacement


Gastrectomy for peptic ulcer disease in the past when





Medications


Prednisone 10 mg daily


Mucinex 600 mg twice a day


Coumadin 5 mg daily but was on hold for possible pulmonary medicine evaluation


Tamsulosin 0.4 mg daily


MiraLAX 17 g daily


Protonix 40 mg daily


Fleet enema 130 mL daily as needed


Metoprolol succinate ER 50 mg twice a day


Calmoseptine 17 g ointment to apply topical twice a day


Melatonin 2 mg at at bedtime for sleep


Milk of magnesia 2400 mg twice a day when necessary for constipation


Zestril 2.5 mg daily


DuoNeb respiratory treatments 4 times a day and every 2 hours when necessary


He has been on NovoLog insulin subcu per protocol before meals and at at bedtime


Glucerna shakes one twice a day


Amaryl 0.5 mg before breakfast


Neurontin 100 mg 3 times a day


Lasix 40 mg daily


Colace 100 mg twice a day


Symbicort 160-4.5 g inhaler 2 puffs twice a day


Dulcolax 10 mg daily


Atorvastatin 40 mg at at bedtime


Aspirin 325 mg daily


Acetaminophen 500 every 4 hours when necessary pain





Review of systems


Patient denies any visual disturbances or unusual headaches.


No nausea or vomiting.  He does have some chronic constipation for which she is 

on medications for.  No blood per rectum.


He does have some intermittent lower leg edema.


No new focal neurological symptoms or weakness.





Family history


Mother had cancer and  at the age of 52.  Father had rectal cancer and  

at age 74.  Patient does have history with his other with myocardial infarction 

at the age of 46.





Social history


Patient lives with family members caring for him at home.  He is .  He 

does not smoke at this time or drink any alcohol.





Physical examination the reveals patient to be alert lying in bed.  Mildly 

fatigued.


Temperature was 98.1 with a pulse of 100-112.  Respirations 20.  Blood pressure 

108/60 and he was 99% saturated with 15 flowrate nonrebreather mask.


Lungs reveal diminished breath sounds at both bases.


Heart tones revealed irregular irregular rhythm with a mildly increased rate.


Abdomen was nontender.  No organomegaly.


There were some aches wraps on the lower extremities but no marked edema noted.

  Extremities also reveal amputations of several fingers and toes.


Neurologically he is alert and oriented.  No cranial nerve deficits no focal 

deficits of weakness.








Laboratory


White count was 17.7 with a hemoglobin 13.1 and a platelet count of 326.


INR was 1.3.  D-dimer though was 2.71.


BUN was 31 with a creatinine of 0.7 given him a GFR greater than 90.  Potassium 

was 4.4 and sodium 137.  Blood sugar 112.


Liver function tests generally were good.  Troponin was only 0.031 CK was 28.  

BNP elevated at 2480.





A chest CT angiographically performed study revealed no evidence of pulmonary 

embolism but did show evidence for consolidation in the right lower lobe and 

left lower lobe.  Along with a left pleural effusion.





EKG showed atrial fibrillation with a rapid ventricular response of 116.  Right 

bundle branch block pattern.  No definite acute ischemic changes noted.





Impressions


Patient presented which shortness of breath associated with hemoptysis up 

frothy sputum.  Bilateral pneumonias with left pleural effusions being seen on 

chest x-ray and CT angiogram.  Patient is status post coronary artery bypass 

surgery.  Other past medical history as delineated above in the body of the 

text.





Plans


Respiratory treatments and antibiotics have been started with some initial 

clinical improvement.


Consultations be obtained with pulmonary medicine and thoracic surgery.


Further recommendations pending clinical response and results of above.


Coumadin on hold in light of any further need for invasive procedures.


Discussed with the patient and nursing staff at bedside this morning.





Past Medical History


Past Medical History: Atrial Fibrillation, Chest Pain / Angina, Heart Failure, 

COPD, Diabetes Mellitus, Deep Vein Thrombosis (DVT), Eye Disorder, GERD/Reflux, 

GI Bleed, Hypertension, Myocardial Infarction (non Q-wave), Osteoarthritis (OA)

, Pneumonia, Prostate Disorder, Respiratory Disorder, Sleep Apnea/CPAP/BIPAP, 

Vascular Disorder


Additional Past Medical History / Comment(s): pt is rt side dominant. lumbar 

disc disease, history of cpap for obstructive sleep apnea-no longer. pt stated 

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 "pt stated may 

have had a mi or tia they were'nt sure".  History of sick sinus syndrome.


History of Any Multi-Drug Resistant Organisms: MRSA


Date of last positivie culture/infection: 2000


MDRO Source:: Left Third Finger/ rt foot


Past Surgical History: Appendectomy, Back Surgery, Cholecystectomy, Heart 

Catheterization, Orthopedic Surgery, Pacemaker


Additional Past Surgical History / Comment(s): andrea cataracts, had part of 

stomach removed and a foot of bowel d/t bleeding peptic ulcers. total lt knee 

replacment,several sx on andrea feet had 4th toe each foot removed.rt hand 2nd 

finger partial amp and 3 rd finger amputated,


Past Anesthesia/Blood Transfusion Reactions: No Reported Reaction


Additional Past Anesthesia/Blood Transfusion Reaction / Comment(s): blood 

transfusion- no reaction


Type of Cardiac Device: Permanent Pacemaker


Device Placement Date:: unk


Past Psychological History: No Psychological Hx Reported


Smoking Status: Former smoker


Past Alcohol Use History: None Reported


Past Drug Use History: None Reported





- Past Family History


  ** Mother


Family Medical History: Cancer


Additional Family Medical History / Comment(s):  age 52





  ** Father


Family Medical History: Cancer


Additional Family Medical History / Comment(s): rectal cancer-  age 75





  ** Brother(s)


Family Medical History: Coronary Artery Disease (CAD), Myocardial Infarction (MI

)


Additional Family Medical History / Comment(s): History of myocardial 

infarction at age 46.





Medications and Allergies


 Home Medications











 Medication  Instructions  Recorded  Confirmed  Type


 


Gabapentin [Neurontin] 100 mg PO TID@0600,1400,2100 18 History


 


Ipratropium-Albuterol Nebulize 3 ml INHALATION RT-QID 18 History





[Duoneb 0.5 mg-3 mg/3 ml Soln]    


 


Tamsulosin HCl [Flomax] 0.4 mg PO DAILY@0800 18 History


 


Acetaminophen Tab [Tylenol] 500 mg PO Q4HR PRN  tab 18 Rx


 


Budesonide-Formot 160-4.5 Mcg 2 puff INHALATION RT-BID  puff 18 

Rx





[Symbicort 160-4.5 Mcg Inhaler]    


 


Ipratropium-Albuterol Nebulize 3 ml INHALATION RT-Q2H PRN 18 Rx





[Duoneb 0.5 mg-3 mg/3 ml Soln] ampul.neb   


 


Lisinopril [Zestril] 2.5 mg PO DAILY@1200  tab 18 Rx


 


Magnesium Hydroxide [Milk of 2,400 mg PO BID PRN  ml 18 Rx





Magnesia Concentrate]    


 


Metoprolol Succinate (ER) [Toprol 50 mg PO BID  tab.er.24h 18 Rx





XL]    


 


Aspirin 325 mg PO DAILY@1700 18 History


 


Atorvastatin [Lipitor] 40 mg PO HS@18 History


 


Bisacodyl [Dulcolax] 10 mg RECTAL DAILY PRN 18 History


 


Docusate [Colace] 100 mg PO BID@0800,2100 18 History


 


Furosemide [Lasix] 40 mg PO DAILY@0600 18 History


 


Glimepiride [Amaryl] 0.5 mg PO AC-BRKFST 18 History


 


Glucerna Shake 1 can PO BID@0800,1700 18 History


 


Insulin Aspart [NovoLOG See Protocol SQ ACHS 18 History





(formulary)]    


 


Melatonin 2 mg PO HS@18 History


 


Menthol/Zinc Oxide [Calmoseptine 1 applic TOPICAL BID@0800,2100 18/

18 History





Ointment]    


 


Na Phos,M-B/Na Phos,Di-Ba [Fleet 133 ml RECTAL DAILY PRN 18 

History





Adult]    


 


Pantoprazole [Protonix] 40 mg PO DAILY@0600 18 History


 


Polyethylene Glycol 3350 [Miralax] 17 gm PO DAILY@0800 18 History


 


Warfarin [Coumadin] 5 mg PO DAILY@1700 18 History


 


guaiFENesin [Mucinex] 600 mg PO BID@0800,2100 18 History


 


predniSONE 10 mg PO DAILY@0800 18 History











 Allergies











Allergy/AdvReac Type Severity Reaction Status Date / Time


 


inhaler AdvReac  Rapid Uncoded 18 15:04





   Heart Rate  


 


PACEMAKER AdvReac  Unknown Uncoded 18 06:38














Physical Exam


Vitals: 


 Vital Signs











  Temp Pulse Resp BP Pulse Ox


 


 18 06:56   114 H  22  


 


 18 06:49   114 H  24  


 


 18 06:38   102 H  19  115/75  95


 


 18 05:21   101 H  19  93/56  100


 


 18 04:45   104 H  19  124/62  100


 


 18 04:16   115 H   


 


 18 04:06   112 H   


 


 18 03:43   110 H  20  108/60  99


 


 18 02:13  98.1 F  112 H  20  116/69  97








 Intake and Output











 18





 22:59 06:59 14:59


 


Other:   


 


  Weight  74.843 kg 














Results


CBC & Chem 7: 


 18 02:00





 18 03:31


Labs: 


 Abnormal Lab Results - Last 24 Hours (Table)











  18 Range/Units





  02:00 03:31 03:31 


 


WBC  17.7 H    (3.8-10.6)  k/uL


 


RDW  16.2 H    (11.5-15.5)  %


 


Neutrophils # (Manual)  15.00 H    (1.3-7.7)  k/uL


 


Monocytes # (Manual)  1.06 H    (0-1.0)  k/uL


 


PT   12.1 H   (9.0-12.0)  sec


 


INR   1.3 H   (<1.2)  


 


D-Dimer   2.71 H   (<0.60)  mg/L FEU


 


Chloride    96 L  ()  mmol/L


 


Carbon Dioxide    31 H  (22-30)  mmol/L


 


BUN    31 H  (9-20)  mg/dL


 


Glucose    112 H  (74-99)  mg/dL


 


Total Creatine Kinase     ()  U/L


 


Total Protein    6.2 L  (6.3-8.2)  g/dL














  18 Range/Units





  03:31 


 


WBC   (3.8-10.6)  k/uL


 


RDW   (11.5-15.5)  %


 


Neutrophils # (Manual)   (1.3-7.7)  k/uL


 


Monocytes # (Manual)   (0-1.0)  k/uL


 


PT   (9.0-12.0)  sec


 


INR   (<1.2)  


 


D-Dimer   (<0.60)  mg/L FEU


 


Chloride   ()  mmol/L


 


Carbon Dioxide   (22-30)  mmol/L


 


BUN   (9-20)  mg/dL


 


Glucose   (74-99)  mg/dL


 


Total Creatine Kinase  28 L  ()  U/L


 


Total Protein   (6.3-8.2)  g/dL

## 2018-07-17 NOTE — US
EXAMINATION TYPE: US chest

 

DATE OF EXAM: 7/17/2018

 

COMPARISON: US 07/13/18 chest CT 7/17/2018

 

CLINICAL HISTORY: Left pleural effusion.

 

EXAM MEASUREMENTS:

 

Left Pleural Effusion fluid pocket:  8.1 cm A/P SAG

**  Left skin to fluid thickness:  3.3 cm A/P

 

Left side was marked for possible thoracentesis outside the dept.

 

Pulmonologists are able to review the images in the patient?s EMR.  

 

 

 

 

 

IMPRESSIONS: Moderate left pleural effusion

## 2018-07-17 NOTE — P.CNPUL
History of Present Illness


Consult date: 18


Requesting physician: Javan Vang


Reason for consult: dyspnea, abnormal CXR/CT


Chief complaint: Shortness of breath


History of present illness: 





This is a very pleasant 78-year-old gentleman who follows with Dr. Vang as 

his primary care physician.  He has a history of hypertension, osteomyelitis, 

peripheral vascular occlusive disease, diabetic neuropathy, diabetes mellitus 

type 2, chronic atrial fibrillation, chronic hypoxic respiratory failure with 

severe COPD, Gold stage III with an FEV1 value of 45% of predicted.  He follows 

in our office for the same.  He has a history of pseudomonas aeruginosa and 

oxacillin sensitive Staphylococcus aureus positive sputum cultures in 2018.  He also has history of coronary artery disease and recently sustained a 

non-ST segment myocardial infarction requiring subsequent coronary artery 

bypass grafting utilizing a LIMA to the LAD, reverse saphenous vein graft to 

the OM1, OM 2, RCA.  He had been seen by Dr. Del Cid last week and follow-up in 

the office.  He was found to have a left pleural effusion measuring 10 cm with 

shortness of breath.  He was asked to hold his warfarin and the plan was for a 

left thoracentesis to be done in the outpatient setting today.  However, he 

presented to the emergency room approximately at 2:00 this morning from the 

[facility] with worsening shortness of breath and coughing up pink 

frothy sputum.  CT angiogram ruled out pulmonary embolism.  There was a noted 

consolidation involving the right lower lobe with subsegmental changes in the 

left lower lobe and endobronchial opacification suspicious for pneumonia.  

There is a left pleural effusion layering posteriorly with mild loculation.  He 

was seen and evaluated in the emergency room this morning with Dr. Del Cid.  He 

is awake and alert in mild respiratory distress.  He initially required 15L 

nonrebreather mask.  He is currently on 5 L/m per nasal cannula to maintain O2 

saturations in the 90s.  Afebrile.  He slightly tachycardic.  Atrial 

fibrillation.  White count 17.7.  Hemoglobin 13.3.  INR 1.3.  Creatinine 0.70.  

ProBNP 2480.  He has been initiated on Zosyn and Levaquin.





Review of Systems


Constitutional: Reports malaise, Reports poor appetite, Reports weakness


Eyes: denies blurred vision, denies decreased vision


Ears: deny: decreased hearing


Ears, nose, mouth and throat: Denies headache, Denies sore throat


Cardiovascular: Reports dyspnea on exertion, Reports irregular heart beat, 

Reports palpitations, Reports shortness of breath


Respiratory: Reports cough with sputum, Reports dyspnea, Reports home oxygen, 

Reports wheezing


Gastrointestinal: Reports loss of appetite


Genitourinary: Reports as per HPI


Musculoskeletal: Reports gait dysfunction, Reports low back pain, Reports prior 

amputations


Integumentary: Denies pruritus, Denies rash


Neurological: Reports weakness


Psychiatric: Reports anxiety


Endocrine: Denies fatigue, Denies weight change


Hematologic/Lymphatic: Reports easy bleeding


Allergic/Immunologic: Reports as per HPI





Past Medical History


Past Medical History: Atrial Fibrillation, Coronary Artery Disease (CAD), Chest 

Pain / Angina, Heart Failure, COPD, Diabetes Mellitus, Deep Vein Thrombosis (DVT

), Eye Disorder, GERD/Reflux, GI Bleed, Hyperlipidemia, Hypertension, 

Myocardial Infarction (non Q-wave), Osteoarthritis (OA), Pneumonia, Prostate 

Disorder, Respiratory Disorder, Sleep Apnea/CPAP/BIPAP, Vascular Disorder


Additional Past Medical History / Comment(s): pt is rt side dominant. lumbar 

disc disease, history of cpap for obstructive sleep apnea-no longer. pt stated 

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 "pt stated may 

have had a mi or tia they were'nt sure".  History of sick sinus syndrome.  

Bilateral carotid stenosis


History of Any Multi-Drug Resistant Organisms: MRSA


Date of last positivie culture/infection: 


MDRO Source:: Sputum culture positive for pseudomonas and MSSA


Past Surgical History: Appendectomy, Back Surgery, Cholecystectomy, Coronary 

Bypass/CABG, Heart Catheterization, Orthopedic Surgery, Pacemaker


Additional Past Surgical History / Comment(s): andrea cataracts, had part of 

stomach removed and a foot of bowel d/t bleeding peptic ulcers. total lt knee 

replacment,several sx on andrea feet had 4th toe each foot removed.rt hand 2nd 

finger partial amp and 3 rd finger amputated, CABG x 4 with left atrial 

appendage ligation in 2018


Past Anesthesia/Blood Transfusion Reactions: No Reported Reaction


Additional Past Anesthesia/Blood Transfusion Reaction / Comment(s): blood 

transfusion- no reaction


Type of Cardiac Device: Permanent Pacemaker


Device Placement Date:: unk


Past Psychological History: No Psychological Hx Reported


Smoking Status: Former smoker


Past Alcohol Use History: None Reported


Past Drug Use History: None Reported





- Past Family History


  ** Mother


Family Medical History: Cancer


Additional Family Medical History / Comment(s):  age 52





  ** Father


Family Medical History: Cancer


Additional Family Medical History / Comment(s): rectal cancer-  age 75





  ** Brother(s)


Family Medical History: Coronary Artery Disease (CAD), Myocardial Infarction (MI

)


Additional Family Medical History / Comment(s): History of myocardial 

infarction at age 46.





Medications and Allergies


 Home Medications











 Medication  Instructions  Recorded  Confirmed  Type


 


Gabapentin [Neurontin] 100 mg PO TID@0600,1400,2100 18 History


 


Ipratropium-Albuterol Nebulize 3 ml INHALATION RT-QID 18 History





[Duoneb 0.5 mg-3 mg/3 ml Soln]    


 


Tamsulosin HCl [Flomax] 0.4 mg PO DAILY@0800 18 History


 


Acetaminophen Tab [Tylenol] 500 mg PO Q4HR PRN  tab 18 Rx


 


Budesonide-Formot 160-4.5 Mcg 2 puff INHALATION RT-BID  puff 18 

Rx





[Symbicort 160-4.5 Mcg Inhaler]    


 


Ipratropium-Albuterol Nebulize 3 ml INHALATION RT-Q2H PRN 18 Rx





[Duoneb 0.5 mg-3 mg/3 ml Soln] ampul.neb   


 


Lisinopril [Zestril] 2.5 mg PO DAILY@1200  tab 18 Rx


 


Magnesium Hydroxide [Milk of 2,400 mg PO BID PRN  ml 18 Rx





Magnesia Concentrate]    


 


Metoprolol Succinate (ER) [Toprol 50 mg PO BID  tab.er.24h 18 Rx





XL]    


 


Aspirin 325 mg PO DAILY@1700 18 History


 


Atorvastatin [Lipitor] 40 mg PO HS@18 History


 


Bisacodyl [Dulcolax] 10 mg RECTAL DAILY PRN 18 History


 


Docusate [Colace] 100 mg PO BID@0800,2100 18 History


 


Furosemide [Lasix] 40 mg PO DAILY@0600 18 History


 


Glimepiride [Amaryl] 0.5 mg PO AC-BRKFST 18 History


 


Glucerna Shake 1 can PO BID@0800,1700 18 History


 


Insulin Aspart [NovoLOG See Protocol SQ ACHS 18 History





(formulary)]    


 


Melatonin 2 mg PO HS@18 History


 


Menthol/Zinc Oxide [Calmoseptine 1 applic TOPICAL BID@0800,2100 18 History





Ointment]    


 


Na Phos,M-B/Na Phos,Di-Ba [Fleet 133 ml RECTAL DAILY PRN 18 

History





Adult]    


 


Pantoprazole [Protonix] 40 mg PO DAILY@0600 18 History


 


Polyethylene Glycol 3350 [Miralax] 17 gm PO DAILY@0800 18 History


 


Warfarin [Coumadin] 5 mg PO DAILY@1700 18 History


 


guaiFENesin [Mucinex] 600 mg PO BID@0800,2100 18 History


 


predniSONE 10 mg PO DAILY@0800 18 History











 Allergies











Allergy/AdvReac Type Severity Reaction Status Date / Time


 


inhaler AdvReac  Rapid Uncoded 18 15:04





   Heart Rate  


 


PACEMAKER AdvReac  Unknown Uncoded 18 06:38














Physical Exam


Vitals: 


 Vital Signs











  Temp Pulse Resp BP Pulse Ox


 


 18 06:56   114 H  22  


 


 18 06:49   114 H  24  


 


 18 06:38   102 H  19  115/75  95


 


 18 05:21   101 H  19  93/56  100


 


 18 04:45   104 H  19  124/62  100


 


 18 04:16   115 H   


 


 18 04:06   112 H   


 


 18 03:43   110 H  20  108/60  99


 


 18 02:13  98.1 F  112 H  20  116/69  97








 Intake and Output











 18





 22:59 06:59 14:59


 


Other:   


 


  Weight  74.843 kg 














- Constitutional


General appearance: cooperative, mild distress





- EENT


Eyes: EOMI, PERRLA


ENT: hearing grossly normal


Ears: bilateral: normal





- Neck


Carotids: bilateral: upstroke normal


Thyroid: bilateral: normal size





- Respiratory


Respiratory: bilateral: diminished, rales, wheezing





- Cardiovascular


Rhythm: irregularly irregular


Heart sounds: normal: S1, S2





- Gastrointestinal


General gastrointestinal: normal bowel sounds





- Integumentary


Integumentary: decreased turgor





- Neurologic


Neurologic: CNII-XII intact





- Musculoskeletal


Musculoskeletal: generalized weakness





- Psychiatric


Psychiatric: A&O x's 3, appropriate affect, intact judgment & insight





Results





- Laboratory Findings


CBC and BMP: 


 18 02:00





 18 03:31


PT/INR, D-dimer











PT  12.1 sec (9.0-12.0)  H  18  03:31    


 


INR  1.3  (<1.2)  H  18  03:31    


 


D-Dimer  2.71 mg/L FEU (<0.60)  H  18  03:31    








Abnormal lab findings: 


 Abnormal Labs











  18





  02:00 03:31 03:31


 


WBC  17.7 H  


 


RDW  16.2 H  


 


Neutrophils # (Manual)  15.00 H  


 


Monocytes # (Manual)  1.06 H  


 


PT   12.1 H 


 


INR   1.3 H 


 


D-Dimer   2.71 H 


 


Chloride    96 L


 


Carbon Dioxide    31 H


 


BUN    31 H


 


Glucose    112 H


 


Total Creatine Kinase   


 


Total Protein    6.2 L














  18





  03:31


 


WBC 


 


RDW 


 


Neutrophils # (Manual) 


 


Monocytes # (Manual) 


 


PT 


 


INR 


 


D-Dimer 


 


Chloride 


 


Carbon Dioxide 


 


BUN 


 


Glucose 


 


Total Creatine Kinase  28 L


 


Total Protein 














- Diagnostic Findings


Chest x-ray: image reviewed


CT scan - chest: image reviewed





Assessment and Plan


Assessment: 





Impression:





#1 Acute on chronic hypoxic respiratory failure secondary to bilateral 

healthcare acquired pneumonia with left pleural effusion, COPD exacerbation and 

CHF exacerbation





#2 Left pleural effusion, warfarin on hold for plans for an outpatient 

thoracentesis today prior to his arrival to the emergency room.  Repeat 

ultrasound is pending.





#3 Acute exacerbation of oxygen dependent chronic obstructive pulmonary disease

, Gold stage III with FEV1 value of 45% of predicted.





#4 Recent pneumonia with positive sputum cultures of Pseudomonas and oxacillin 

sensitive Staphylococcus aureus from 2018.





#5 Acute exacerbation of chronic systolic congestive heart failure.  Mild to 

moderately impaired left ventricular systolic function with ejection fraction 40

-45%.





#6 Atrial fibrillation with varying ventricular response.  Warfarin currently 

on hold.  INR 1.3.





#7 Recent non-ST segment elevation myocardial infarction and subsequent 

coronary artery bypass grafting on 2018.  LIMA to the LAD, reverse this 

saphenous vein grafts to the first obtuse marginal artery, second obtuse 

marginal artery, right coronary artery.





#8 Diabetes mellitus, type II.





#9 Diabetic neuropathy.





#10 Peripheral vascular occlusive disease.  Multiple surgeries including 

palpitations of the fourth toe bilaterally.





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 

right second finger.





#12 Hypertension, history of.





#13 History of peptic ulcer disease with partial gastrectomy and colectomy. 





#14 Poor overall functional performance based on the above-mentioned multiple 

comorbidities.





Plan:





The patient was seen and evaluated by Dr. Del Cid.  CAT scan and labs were all 

reviewed.  The previously marked left chest was quite low, possibly below the 

diaphragm.  We'll request a repeat ultrasound of the left chest with new 

markings.  Continue to hold warfarin for now.  Heparin for DVT prophylaxis.  

Protonix for GI prophylaxis.  Continue antibiotics in the form of Zosyn and 

Levaquin.  DuoNeb inhalations every 4 hours and when necessary, Pulmicort and 

Perforomist inhalations twice a day, IV Solu-Medrol 60 mg every 6 hours.  

Repeat sputum culture. Continue diuretics.  Transfer to the selective care unit 

once a bed becomes available.  We'll continue to follow and make further 

recommendations based on his clinical status.





I, the cosigning physician, performed a history & physical examination of the 

patient. Lungs sounds with bilateral wheezing, crackles in the posterior bases, 

diminished.  Maintaining good O2 saturations in the 90s on 5 L/m per nasal 

cannula.  I discussed the assessment and plan of care with my nurse practitioner

, Li Wong. I attest to the above note as dictated by her.


Time with Patient: Greater than 30

## 2018-07-17 NOTE — CT
EXAM:

  CT Angiography Chest With Intravenous Contrast

 

CLINICAL HISTORY:

  Pain

 

TECHNIQUE:

  Axial computed tomographic angiography images of the chest with 

intravenous contrast using pulmonary embolism protocol.  CTDI is 4.9 mGy 

and DLP is 330.80 mGy-cm.  This CT exam was performed using one or more 

of the following dose reduction techniques: automated exposure control, 

adjustment of the mA and/or kV according to patient size, and/or use of 

iterative reconstruction technique.

  MIP reconstructed images were created and reviewed.

 

COMPARISON:

  No relevant prior studies available.

 

FINDINGS:

  Pulmonary arteries:  Unremarkable.  No pulmonary embolism.

  Aorta:  See below.  

  Lungs:  There is consolidation involving almost the entire right lower 

lobe.  There is opacification of the bronchial segments involving the 

lower lobes.  There is some airspace disease involving the left lower 

lobe with volume loss and compressive atelectatic changes.  Panlobular 

emphysematous changes are noted predominantly involving the upper lung 

zones.

  Pleural space:  A slightly lobulated left pleural effusion is noted 

posteriorly measuring up to 4 cm.  No pneumothorax.

  Heart:  Coronary artery calcification noted.  There is evidence of 

coronary artery bypass involving the ascending aorta and left LIMA.  No 

significant pericardial effusion.  No evidence of RV dysfunction.

  Bones/joints:  No acute fracture.  No dislocation.

  Soft tissues:  Unremarkable.

  Lymph nodes:  Subcarinal lymphadenopathy measuring up to 18 mm.  

Scattered paratracheal and precarinal lymph nodes measuring up to 11 mm.

  Liver:  Slightly irregular contours to the liver, partially visualized.

  Tubes, lines and devices:  Left subclavian pacer leads identified.

 

IMPRESSION: 1.  No evidence for pulmonary embolism.

2.  Consolidation involving the right lower lobe.  Subsegmental changes 

involving the left lower lobe with endobronchial opacification.  Primary 

consideration is pneumonia versus aspiration.  Presumed reactive 

subcarinal and paratracheal lymph nodes.

3.  Left pleural effusion layering posteriorly with mild lobulation.

## 2018-07-18 LAB
ALBUMIN SERPL-MCNC: 3.3 G/DL (ref 3.5–5)
ALP SERPL-CCNC: 90 U/L (ref 38–126)
ALT SERPL-CCNC: 32 U/L (ref 21–72)
ANION GAP SERPL CALC-SCNC: 9 MMOL/L
AST SERPL-CCNC: 25 U/L (ref 17–59)
BASOPHILS # BLD AUTO: 0 K/UL (ref 0–0.2)
BASOPHILS NFR BLD AUTO: 0 %
BUN SERPL-SCNC: 28 MG/DL (ref 9–20)
CALCIUM SPEC-MCNC: 9 MG/DL (ref 8.4–10.2)
CELL CNT PNL FLD: 100
CHLORIDE SERPL-SCNC: 94 MMOL/L (ref 98–107)
CO2 SERPL-SCNC: 34 MMOL/L (ref 22–30)
DEPRECATED POLYS # FLD: 39 %
EOSINOPHIL # BLD AUTO: 0 K/UL (ref 0–0.7)
EOSINOPHIL NFR BLD AUTO: 0 %
ERYTHROCYTE [DISTWIDTH] IN BLOOD BY AUTOMATED COUNT: 4.12 M/UL (ref 4.3–5.9)
ERYTHROCYTE [DISTWIDTH] IN BLOOD: 16.4 % (ref 11.5–15.5)
GLUCOSE BLD-MCNC: 177 MG/DL (ref 75–99)
GLUCOSE BLD-MCNC: 179 MG/DL (ref 75–99)
GLUCOSE BLD-MCNC: 222 MG/DL (ref 75–99)
GLUCOSE BLD-MCNC: 246 MG/DL (ref 75–99)
GLUCOSE SERPL-MCNC: 165 MG/DL (ref 74–99)
HCT VFR BLD AUTO: 36.3 % (ref 39–53)
HGB BLD-MCNC: 11.2 GM/DL (ref 13–17.5)
INR PPP: 1.3 (ref ?–1.2)
LYMPHOCYTES # SPEC AUTO: 0.6 K/UL (ref 1–4.8)
LYMPHOCYTES NFR SPEC AUTO: 4 %
MAGNESIUM SPEC-SCNC: 2.5 MG/DL (ref 1.6–2.3)
MCH RBC QN AUTO: 27.1 PG (ref 25–35)
MCHC RBC AUTO-ENTMCNC: 30.7 G/DL (ref 31–37)
MCV RBC AUTO: 88.2 FL (ref 80–100)
MONOCYTES # BLD AUTO: 0.5 K/UL (ref 0–1)
MONOCYTES NFR BLD AUTO: 4 %
MONONUC CELLS # FLD: 61 %
NEUTROPHILS # BLD AUTO: 11.8 K/UL (ref 1.3–7.7)
NEUTROPHILS NFR BLD AUTO: 92 %
NUC CELL # FLD: 800 /UL
PLATELET # BLD AUTO: 232 K/UL (ref 150–450)
POTASSIUM SERPL-SCNC: 4.7 MMOL/L (ref 3.5–5.1)
PROT SERPL-MCNC: 5.7 G/DL (ref 6.3–8.2)
PT BLD: 12.3 SEC (ref 9–12)
SODIUM SERPL-SCNC: 137 MMOL/L (ref 137–145)
WBC # BLD AUTO: 12.9 K/UL (ref 3.8–10.6)

## 2018-07-18 PROCEDURE — 0W9B3ZZ DRAINAGE OF LEFT PLEURAL CAVITY, PERCUTANEOUS APPROACH: ICD-10-PCS

## 2018-07-18 RX ADMIN — ASPIRIN 325 MG ORAL TABLET SCH MG: 325 PILL ORAL at 17:35

## 2018-07-18 RX ADMIN — INSULIN ASPART SCH UNIT: 100 INJECTION, SOLUTION INTRAVENOUS; SUBCUTANEOUS at 17:34

## 2018-07-18 RX ADMIN — TAMSULOSIN HYDROCHLORIDE SCH MG: 0.4 CAPSULE ORAL at 21:01

## 2018-07-18 RX ADMIN — DOCUSATE SODIUM SCH MG: 100 CAPSULE, LIQUID FILLED ORAL at 21:01

## 2018-07-18 RX ADMIN — GABAPENTIN SCH MG: 100 CAPSULE ORAL at 21:01

## 2018-07-18 RX ADMIN — INSULIN ASPART SCH UNIT: 100 INJECTION, SOLUTION INTRAVENOUS; SUBCUTANEOUS at 22:34

## 2018-07-18 RX ADMIN — DEXTROSE MONOHYDRATE SCH MLS/HR: 5 INJECTION, SOLUTION INTRAVENOUS at 17:34

## 2018-07-18 RX ADMIN — DOCUSATE SODIUM SCH MG: 100 CAPSULE, LIQUID FILLED ORAL at 12:27

## 2018-07-18 RX ADMIN — IPRATROPIUM BROMIDE AND ALBUTEROL SULFATE SCH ML: .5; 3 SOLUTION RESPIRATORY (INHALATION) at 12:07

## 2018-07-18 RX ADMIN — GLIMEPIRIDE SCH MG: 1 TABLET ORAL at 07:08

## 2018-07-18 RX ADMIN — DEXTROSE MONOHYDRATE SCH MLS/HR: 5 INJECTION, SOLUTION INTRAVENOUS at 23:25

## 2018-07-18 RX ADMIN — IPRATROPIUM BROMIDE AND ALBUTEROL SULFATE SCH ML: .5; 3 SOLUTION RESPIRATORY (INHALATION) at 14:58

## 2018-07-18 RX ADMIN — INSULIN ASPART SCH UNIT: 100 INJECTION, SOLUTION INTRAVENOUS; SUBCUTANEOUS at 07:34

## 2018-07-18 RX ADMIN — METHYLPREDNISOLONE SODIUM SUCCINATE SCH MG: 125 INJECTION, POWDER, FOR SOLUTION INTRAMUSCULAR; INTRAVENOUS at 07:06

## 2018-07-18 RX ADMIN — METHYLPREDNISOLONE SODIUM SUCCINATE SCH MG: 125 INJECTION, POWDER, FOR SOLUTION INTRAMUSCULAR; INTRAVENOUS at 12:26

## 2018-07-18 RX ADMIN — HEPARIN SODIUM SCH UNIT: 5000 INJECTION, SOLUTION INTRAVENOUS; SUBCUTANEOUS at 12:27

## 2018-07-18 RX ADMIN — DEXTROSE MONOHYDRATE SCH: 5 INJECTION, SOLUTION INTRAVENOUS at 07:06

## 2018-07-18 RX ADMIN — METHYLPREDNISOLONE SODIUM SUCCINATE SCH: 125 INJECTION, POWDER, FOR SOLUTION INTRAMUSCULAR; INTRAVENOUS at 07:05

## 2018-07-18 RX ADMIN — GABAPENTIN SCH MG: 100 CAPSULE ORAL at 12:38

## 2018-07-18 RX ADMIN — DEXTROSE MONOHYDRATE SCH MLS/HR: 5 INJECTION, SOLUTION INTRAVENOUS at 12:26

## 2018-07-18 RX ADMIN — Medication SCH MG: at 22:36

## 2018-07-18 RX ADMIN — FORMOTEROL FUMARATE DIHYDRATE SCH MCG: 20 SOLUTION RESPIRATORY (INHALATION) at 19:08

## 2018-07-18 RX ADMIN — BUDESONIDE SCH MG: 1 SUSPENSION RESPIRATORY (INHALATION) at 19:08

## 2018-07-18 RX ADMIN — ATORVASTATIN CALCIUM SCH MG: 40 TABLET, FILM COATED ORAL at 21:02

## 2018-07-18 RX ADMIN — IPRATROPIUM BROMIDE AND ALBUTEROL SULFATE SCH ML: .5; 3 SOLUTION RESPIRATORY (INHALATION) at 19:08

## 2018-07-18 RX ADMIN — IPRATROPIUM BROMIDE AND ALBUTEROL SULFATE SCH ML: .5; 3 SOLUTION RESPIRATORY (INHALATION) at 08:26

## 2018-07-18 RX ADMIN — INSULIN ASPART SCH UNIT: 100 INJECTION, SOLUTION INTRAVENOUS; SUBCUTANEOUS at 12:27

## 2018-07-18 RX ADMIN — IPRATROPIUM BROMIDE AND ALBUTEROL SULFATE PRN ML: .5; 3 SOLUTION RESPIRATORY (INHALATION) at 04:21

## 2018-07-18 RX ADMIN — METHYLPREDNISOLONE SODIUM SUCCINATE SCH MG: 40 INJECTION, POWDER, FOR SOLUTION INTRAMUSCULAR; INTRAVENOUS at 23:25

## 2018-07-18 RX ADMIN — METHYLPREDNISOLONE SODIUM SUCCINATE SCH MG: 40 INJECTION, POWDER, FOR SOLUTION INTRAMUSCULAR; INTRAVENOUS at 21:01

## 2018-07-18 RX ADMIN — METOPROLOL SUCCINATE SCH MG: 50 TABLET, EXTENDED RELEASE ORAL at 22:36

## 2018-07-18 RX ADMIN — GABAPENTIN SCH MG: 100 CAPSULE ORAL at 07:06

## 2018-07-18 RX ADMIN — BUDESONIDE SCH MG: 1 SUSPENSION RESPIRATORY (INHALATION) at 08:26

## 2018-07-18 RX ADMIN — METOPROLOL SUCCINATE SCH MG: 50 TABLET, EXTENDED RELEASE ORAL at 12:27

## 2018-07-18 RX ADMIN — HEPARIN SODIUM SCH UNIT: 5000 INJECTION, SOLUTION INTRAVENOUS; SUBCUTANEOUS at 17:35

## 2018-07-18 RX ADMIN — HEPARIN SODIUM SCH UNIT: 5000 INJECTION, SOLUTION INTRAVENOUS; SUBCUTANEOUS at 23:24

## 2018-07-18 RX ADMIN — FORMOTEROL FUMARATE DIHYDRATE SCH MCG: 20 SOLUTION RESPIRATORY (INHALATION) at 08:26

## 2018-07-18 RX ADMIN — HEPARIN SODIUM SCH: 5000 INJECTION, SOLUTION INTRAVENOUS; SUBCUTANEOUS at 07:05

## 2018-07-18 NOTE — XR
EXAMINATION TYPE: XR chest 2V

 

DATE OF EXAM: 7/18/2018

 

COMPARISON: Prior chest 6/18/2018

 

HISTORY: Pleural effusion

 

TECHNIQUE:  Frontal and lateral views of the chest are obtained.

 

FINDINGS:  The left hemidiaphragm is obscured, there is blunting of the left costophrenic angle. Shereen
ent is post median sternotomy and the heart is enlarged. Pacemaker is stable with leads in the right 
atrium and ventricle. Atrial appendage clipping change is noted. Prominent lung volumes compatible wi
th underlying COPD. Pulmonary vascularity and lupis within normal limits. No evident pneumothorax. Bas
ilar density suspected on the right.

 

IMPRESSION:  Pleural effusion has increased in the interval. There may be basilar atelectasis versus 
pneumonia, atypical presentation of congestive heart failure in a patient with pre-existing COPD. Cor
relate. Follow-up recommended.

## 2018-07-18 NOTE — XR
EXAMINATION TYPE: XR chest 1V

 

DATE OF EXAM: 7/18/2018

 

COMPARISON: Prior chest 7/18/2018 and earlier time

 

HISTORY: Status post left thoracentesis

 

TECHNIQUE: Single frontal view of the chest is obtained.

 

FINDINGS:  There is some improvement in aeration at the left lung base. No evident pneumothorax. No s
ignificant interval change.

 

IMPRESSION:  No evident complication status post left thoracentesis. Correlate for right lower lobe p
neumonia versus pulmonary edema or atelectasis.

## 2018-07-18 NOTE — US
EXAMINATION TYPE: US thoracentesis

 

DATE OF EXAM: 7/18/2018

 

COMPARISON: NONE

 

HISTORY: Pleural effusion.

 

FINDINGS: Maximal barrier technique was utilized.  The skin overlying a suitable pocket of fluid was 
localized and the overlying skin prepped and draped.  Lidocaine was used for local anesthesia. Ultras
ound was used with sterile technique.  A 5 Citizen of [place] catheter over guide needle was advanced into the pl
eural fluid collection using ultrasound guidance and the catheter advanced needle removed.  Approxima
tely 1.06 liter(s) of serous sanguinous fluid was removed.  Catheter was withdrawn and hemostasis ach
ieved.  There is no immediate complication.  The patient discharged in stable condition without compl
ication.

 

IMPRESSION: STATUS POST ULTRASOUND GUIDED THORACENTESIS, POST PROCEDURE CHEST X-RAY PENDING.  THIS WA
OCEDURE WAS PERFORMED BY THE UNDERSIGNED. Specimen sent for laboratory analysis.

## 2018-07-19 LAB
ALBUMIN SERPL-MCNC: 3 G/DL (ref 3.5–5)
ALP SERPL-CCNC: 72 U/L (ref 38–126)
ALT SERPL-CCNC: 33 U/L (ref 21–72)
ANION GAP SERPL CALC-SCNC: 7 MMOL/L
AST SERPL-CCNC: 24 U/L (ref 17–59)
BASOPHILS # BLD AUTO: 0 K/UL (ref 0–0.2)
BASOPHILS NFR BLD AUTO: 0 %
BUN SERPL-SCNC: 30 MG/DL (ref 9–20)
CALCIUM SPEC-MCNC: 8.6 MG/DL (ref 8.4–10.2)
CHLORIDE SERPL-SCNC: 97 MMOL/L (ref 98–107)
CO2 SERPL-SCNC: 33 MMOL/L (ref 22–30)
EOSINOPHIL # BLD AUTO: 0 K/UL (ref 0–0.7)
EOSINOPHIL NFR BLD AUTO: 0 %
ERYTHROCYTE [DISTWIDTH] IN BLOOD BY AUTOMATED COUNT: 3.9 M/UL (ref 4.3–5.9)
ERYTHROCYTE [DISTWIDTH] IN BLOOD: 16 % (ref 11.5–15.5)
GLUCOSE BLD-MCNC: 141 MG/DL (ref 75–99)
GLUCOSE BLD-MCNC: 159 MG/DL (ref 75–99)
GLUCOSE BLD-MCNC: 177 MG/DL (ref 75–99)
GLUCOSE BLD-MCNC: 202 MG/DL (ref 75–99)
GLUCOSE SERPL-MCNC: 170 MG/DL (ref 74–99)
HCT VFR BLD AUTO: 33.9 % (ref 39–53)
HGB BLD-MCNC: 10.4 GM/DL (ref 13–17.5)
INR PPP: 1.2 (ref ?–1.2)
LYMPHOCYTES # SPEC AUTO: 0.3 K/UL (ref 1–4.8)
LYMPHOCYTES NFR SPEC AUTO: 3 %
MAGNESIUM SPEC-SCNC: 2.3 MG/DL (ref 1.6–2.3)
MCH RBC QN AUTO: 26.7 PG (ref 25–35)
MCHC RBC AUTO-ENTMCNC: 30.7 G/DL (ref 31–37)
MCV RBC AUTO: 86.9 FL (ref 80–100)
MONOCYTES # BLD AUTO: 0.4 K/UL (ref 0–1)
MONOCYTES NFR BLD AUTO: 5 %
NEUTROPHILS # BLD AUTO: 8.2 K/UL (ref 1.3–7.7)
NEUTROPHILS NFR BLD AUTO: 91 %
PLATELET # BLD AUTO: 179 K/UL (ref 150–450)
POTASSIUM SERPL-SCNC: 4.8 MMOL/L (ref 3.5–5.1)
PROT FLD-MCNC: 2400 MG/DL
PROT SERPL-MCNC: 5.2 G/DL (ref 6.3–8.2)
PT BLD: 11.2 SEC (ref 9–12)
SODIUM SERPL-SCNC: 137 MMOL/L (ref 137–145)
WBC # BLD AUTO: 9 K/UL (ref 3.8–10.6)

## 2018-07-19 RX ADMIN — INSULIN ASPART SCH UNIT: 100 INJECTION, SOLUTION INTRAVENOUS; SUBCUTANEOUS at 12:14

## 2018-07-19 RX ADMIN — HEPARIN SODIUM SCH UNIT: 5000 INJECTION, SOLUTION INTRAVENOUS; SUBCUTANEOUS at 12:14

## 2018-07-19 RX ADMIN — GABAPENTIN SCH MG: 100 CAPSULE ORAL at 20:53

## 2018-07-19 RX ADMIN — LEVOFLOXACIN SCH MLS/HR: 750 INJECTION, SOLUTION INTRAVENOUS at 05:53

## 2018-07-19 RX ADMIN — IPRATROPIUM BROMIDE AND ALBUTEROL SULFATE PRN ML: .5; 3 SOLUTION RESPIRATORY (INHALATION) at 03:45

## 2018-07-19 RX ADMIN — IPRATROPIUM BROMIDE AND ALBUTEROL SULFATE SCH ML: .5; 3 SOLUTION RESPIRATORY (INHALATION) at 11:31

## 2018-07-19 RX ADMIN — DOCUSATE SODIUM SCH MG: 100 CAPSULE, LIQUID FILLED ORAL at 20:53

## 2018-07-19 RX ADMIN — GABAPENTIN SCH MG: 100 CAPSULE ORAL at 05:52

## 2018-07-19 RX ADMIN — IPRATROPIUM BROMIDE AND ALBUTEROL SULFATE SCH ML: .5; 3 SOLUTION RESPIRATORY (INHALATION) at 20:22

## 2018-07-19 RX ADMIN — DEXTROSE MONOHYDRATE SCH MLS/HR: 5 INJECTION, SOLUTION INTRAVENOUS at 16:29

## 2018-07-19 RX ADMIN — FORMOTEROL FUMARATE DIHYDRATE SCH MCG: 20 SOLUTION RESPIRATORY (INHALATION) at 07:38

## 2018-07-19 RX ADMIN — IPRATROPIUM BROMIDE AND ALBUTEROL SULFATE SCH ML: .5; 3 SOLUTION RESPIRATORY (INHALATION) at 15:52

## 2018-07-19 RX ADMIN — METOPROLOL SUCCINATE SCH MG: 50 TABLET, EXTENDED RELEASE ORAL at 08:31

## 2018-07-19 RX ADMIN — INSULIN ASPART SCH UNIT: 100 INJECTION, SOLUTION INTRAVENOUS; SUBCUTANEOUS at 21:26

## 2018-07-19 RX ADMIN — FUROSEMIDE SCH MG: 40 TABLET ORAL at 05:52

## 2018-07-19 RX ADMIN — METHYLPREDNISOLONE SODIUM SUCCINATE SCH MG: 40 INJECTION, POWDER, FOR SOLUTION INTRAMUSCULAR; INTRAVENOUS at 18:25

## 2018-07-19 RX ADMIN — BUDESONIDE SCH MG: 1 SUSPENSION RESPIRATORY (INHALATION) at 07:38

## 2018-07-19 RX ADMIN — IPRATROPIUM BROMIDE AND ALBUTEROL SULFATE SCH ML: .5; 3 SOLUTION RESPIRATORY (INHALATION) at 07:38

## 2018-07-19 RX ADMIN — AMIODARONE HYDROCHLORIDE SCH MG: 200 TABLET ORAL at 20:53

## 2018-07-19 RX ADMIN — AMIODARONE HYDROCHLORIDE SCH MLS/HR: 50 INJECTION, SOLUTION INTRAVENOUS at 08:30

## 2018-07-19 RX ADMIN — GABAPENTIN SCH MG: 100 CAPSULE ORAL at 16:33

## 2018-07-19 RX ADMIN — INSULIN ASPART SCH UNIT: 100 INJECTION, SOLUTION INTRAVENOUS; SUBCUTANEOUS at 18:26

## 2018-07-19 RX ADMIN — METHYLPREDNISOLONE SODIUM SUCCINATE SCH MG: 40 INJECTION, POWDER, FOR SOLUTION INTRAMUSCULAR; INTRAVENOUS at 12:14

## 2018-07-19 RX ADMIN — DEXTROSE MONOHYDRATE SCH MLS/HR: 5 INJECTION, SOLUTION INTRAVENOUS at 08:31

## 2018-07-19 RX ADMIN — GLIMEPIRIDE SCH MG: 1 TABLET ORAL at 07:34

## 2018-07-19 RX ADMIN — Medication SCH MG: at 20:54

## 2018-07-19 RX ADMIN — ATORVASTATIN CALCIUM SCH MG: 40 TABLET, FILM COATED ORAL at 20:53

## 2018-07-19 RX ADMIN — HEPARIN SODIUM SCH UNIT: 5000 INJECTION, SOLUTION INTRAVENOUS; SUBCUTANEOUS at 16:32

## 2018-07-19 RX ADMIN — INSULIN ASPART SCH UNIT: 100 INJECTION, SOLUTION INTRAVENOUS; SUBCUTANEOUS at 07:35

## 2018-07-19 RX ADMIN — TAMSULOSIN HYDROCHLORIDE SCH MG: 0.4 CAPSULE ORAL at 20:54

## 2018-07-19 RX ADMIN — HEPARIN SODIUM SCH UNIT: 5000 INJECTION, SOLUTION INTRAVENOUS; SUBCUTANEOUS at 22:51

## 2018-07-19 RX ADMIN — METHYLPREDNISOLONE SODIUM SUCCINATE SCH MG: 40 INJECTION, POWDER, FOR SOLUTION INTRAMUSCULAR; INTRAVENOUS at 22:51

## 2018-07-19 RX ADMIN — DOCUSATE SODIUM SCH MG: 100 CAPSULE, LIQUID FILLED ORAL at 08:31

## 2018-07-19 RX ADMIN — AMIODARONE HYDROCHLORIDE SCH MG: 200 TABLET ORAL at 16:33

## 2018-07-19 RX ADMIN — AMIODARONE HYDROCHLORIDE SCH: 50 INJECTION, SOLUTION INTRAVENOUS at 21:45

## 2018-07-19 RX ADMIN — PANTOPRAZOLE SODIUM SCH MG: 40 TABLET, DELAYED RELEASE ORAL at 05:53

## 2018-07-19 RX ADMIN — BUDESONIDE SCH MG: 1 SUSPENSION RESPIRATORY (INHALATION) at 20:22

## 2018-07-19 RX ADMIN — ASPIRIN 325 MG ORAL TABLET SCH MG: 325 PILL ORAL at 16:34

## 2018-07-19 RX ADMIN — METHYLPREDNISOLONE SODIUM SUCCINATE SCH MG: 40 INJECTION, POWDER, FOR SOLUTION INTRAMUSCULAR; INTRAVENOUS at 05:52

## 2018-07-19 RX ADMIN — DEXTROSE MONOHYDRATE SCH MLS/HR: 5 INJECTION, SOLUTION INTRAVENOUS at 22:51

## 2018-07-19 RX ADMIN — FORMOTEROL FUMARATE DIHYDRATE SCH MCG: 20 SOLUTION RESPIRATORY (INHALATION) at 20:22

## 2018-07-19 RX ADMIN — METOPROLOL SUCCINATE SCH MG: 50 TABLET, EXTENDED RELEASE ORAL at 20:54

## 2018-07-19 NOTE — P.CRDCN
History of Present Illness


Chief complaint: nonsustained ventricular tachycardia


History of present illness: 





This is a pleasant 78-year-old gentleman with a past medical history 

significant for coronary artery disease and status post coronary artery that is 

grafting in 2018 where the patient underwent CABG 4 with LIMA to LAD, 

reverse SVG to OM1 and OM 2, and reverse SVG to RCA, as well as multiple 

comorbid conditions including chronic atrial fibrillation on oral 

anticoagulation was Coumadin, tonic hypoxic respiratory failure secondary to 

COPD, the patient uses oxygen at night, mild cardiomyopathy with an ejection 

fraction of 45%, was transferred from [facility] care facility to the hospital 

because he was short of breath.  The patient was residing at [facility] 

until about 2 days ago when he started noticing that he is coughing a lot and 

he was more short of breath.  No chest pain or chest discomfort.  No dizziness 

or lightheadedness.  And no syncope.  The patient underwent a chest x-ray I 

subsequently ultrasound of the chest and he was found to have large pleural 

effusion measured 10 cm.  The patient was on Coumadin which was held and 

subsequently he underwent yesterday left pleurocentesis.  Today he is feeling 

much better in terms of shortness of breath.  They're also possibility of 

underlying pneumonia and the patient currently is on antibiotic.





We get involved in the care of the patient because he developed multiple 

episodes of nonsustained ventricular tachycardia with the longest of 30 beats 

per minutes.  I don't have any recent echocardiogram on the patient after the 

open heart surgery.  The last echo before the surgery showed an EF of 45%.  The 

patient is on metoprolol and the blood pressure has been marginally low.  He 

was started yesterday after the nonsustained V. tach on amiodarone IV and he 

continues to be on amiodarone IV at this point.  I'm going to switch him to 

amiodarone by mouth.  Unfortunately we cannot increase the dose of metoprolol 

in view of the marginally low blood pressure.  Also I am going to obtain an 

echocardiogram was Doppler.  We will resume his Coumadin later on today since 

he had the pleurocentesis done.











Past Medical History


Past Medical History: Atrial Fibrillation, Coronary Artery Disease (CAD), Chest 

Pain / Angina, Heart Failure, COPD, Diabetes Mellitus, Deep Vein Thrombosis (DVT

), Eye Disorder, GERD/Reflux, GI Bleed, Hyperlipidemia, Hypertension, 

Myocardial Infarction (MI), Osteoarthritis (OA), Pneumonia, Prostate Disorder, 

Respiratory Disorder, Skin Disorder, Sleep Apnea/CPAP/BIPAP, Vascular Disorder


Additional Past Medical History / Comment(s): Chronic afib, chronic CHF, severe 

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 

irregular heart beat, SSS, KARL with past CPAP use,  O2 at 3L/NC at HS, possible 

TIA per pt, bilateral caratid stenosis, PVD, peripheral occlusive disease, 

ulcer to hands/feet in past, osteomylitis fingers/toes, blackened tip to 3rd 

finger L hand, small L heel ulcer healing well, peptic ulcer with surgery, BPH, 

lumbar disc disease, back pain.


Last Myocardial Infarction Date:: 18


History of Any Multi-Drug Resistant Organisms: MRSA


Date of last positivie culture/infection: 


MDRO Source:: L third finger


Past Surgical History: Appendectomy, Back Surgery, Cholecystectomy, Coronary 

Bypass/CABG, Heart Catheterization, Joint Replacement, Orthopedic Surgery, 

Pacemaker


Additional Past Surgical History / Comment(s): andrea cataracts, had partial 

gastrectomy and bowel resection d/t bleeding peptic ulcers, total lt knee 

replacment, several sx on andrea feet had 4th toe each foot removed, rt hand 2nd 

finger partial amp and 3 rd finger amputated, colonoscopies, CABG x 4 with left 

atrial appendage ligation in 2018


Past Anesthesia/Blood Transfusion Reactions: No Reported Reaction


Additional Past Anesthesia/Blood Transfusion Reaction / Comment(s): blood 

transfusion- no reaction


Type of Cardiac Device: Permanent Pacemaker


Device Placement Date:: 


Smoking Status: Former smoker





- Past Family History


  ** Mother


Family Medical History: Cancer


Additional Family Medical History / Comment(s):  age 52 from colon cancer.





  ** Father


Family Medical History: Cancer


Additional Family Medical History / Comment(s): rectal cancer-  age 75





  ** Brother(s)


Family Medical History: Coronary Artery Disease (CAD), Myocardial Infarction (MI

)


Additional Family Medical History / Comment(s): History of myocardial 

infarction at age 46.





Medications and Allergies


 Home Medications











 Medication  Instructions  Recorded  Confirmed  Type


 


Gabapentin [Neurontin] 100 mg PO TID@0600,1400,2100 18 History


 


Ipratropium-Albuterol Nebulize 3 ml INHALATION RT-QID 18 History





[Duoneb 0.5 mg-3 mg/3 ml Soln]    


 


Tamsulosin HCl [Flomax] 0.4 mg PO DAILY@0800 18 History


 


Acetaminophen Tab [Tylenol] 500 mg PO Q4HR PRN  tab 18 Rx


 


Budesonide-Formot 160-4.5 Mcg 2 puff INHALATION RT-BID  puff 18 

Rx





[Symbicort 160-4.5 Mcg Inhaler]    


 


Ipratropium-Albuterol Nebulize 3 ml INHALATION RT-Q2H PRN 18 Rx





[Duoneb 0.5 mg-3 mg/3 ml Soln] ampul.neb   


 


Lisinopril [Zestril] 2.5 mg PO DAILY@1200  tab 18 Rx


 


Magnesium Hydroxide [Milk of 2,400 mg PO BID PRN  ml 18 Rx





Magnesia Concentrate]    


 


Metoprolol Succinate (ER) [Toprol 50 mg PO BID  tab.er.24h 18 Rx





XL]    


 


Aspirin 325 mg PO DAILY@1700 18 History


 


Atorvastatin [Lipitor] 40 mg PO HS@18 History


 


Bisacodyl [Dulcolax] 10 mg RECTAL DAILY PRN 18 History


 


Docusate [Colace] 100 mg PO BID@0800,2100 18 History


 


Furosemide [Lasix] 40 mg PO DAILY@0600 18 History


 


Glimepiride [Amaryl] 0.5 mg PO AC-BRKFST 18 History


 


Glucerna Shake 1 can PO BID@0800,1700 18 History


 


Insulin Aspart [NovoLOG See Protocol SQ ACHS 18 History





(formulary)]    


 


Melatonin 2 mg PO HS@18 History


 


Menthol/Zinc Oxide [Calmoseptine 1 applic TOPICAL BID@0800,2100 18 History





Ointment]    


 


Na Phos,M-B/Na Phos,Di-Ba [Fleet 133 ml RECTAL DAILY PRN 18 

History





Adult]    


 


Pantoprazole [Protonix] 40 mg PO DAILY@0600 18 History


 


Polyethylene Glycol 3350 [Miralax] 17 gm PO DAILY@0800 18 History


 


Warfarin [Coumadin] 5 mg PO DAILY@1700 18 History


 


guaiFENesin [Mucinex] 600 mg PO BID@0800,2100 18 History


 


predniSONE 10 mg PO DAILY@0800 18 History











 Allergies











Allergy/AdvReac Type Severity Reaction Status Date / Time


 


inhaler AdvReac  Rapid Uncoded 18 15:04





   Heart Rate  


 


PACEMAKER AdvReac  Unknown Uncoded 18 06:38














Physical Exam


Vitals: 


 Vital Signs











  Temp Pulse Pulse Resp BP Pulse Ox


 


 18 11:42   86    


 


 18 11:33   84    


 


 18 08:03   86    


 


 18 08:00  97.0 F L   90  18  105/60  94 L


 


 18 07:56   80    


 


 18 07:55   80    


 


 18 07:40   76     100


 


 18 04:00  97.3 F L   78  18  143/69  97


 


 18 03:45   92    


 


 18 00:00  97.1 F L   76  18  143/69  98


 


 18 20:00  96.2 F L   93  18  116/59  98


 


 18 19:33   98    


 


 18 19:19   96    


 


 18 19:18   96    


 


 18 19:08   94    


 


 18 16:00  97.0 F L   97  18  125/69  94 L


 


 18 15:07   98    


 


 18 14:58   94    








 Intake and Output











 18





 22:59 06:59 14:59


 


Intake Total 236  476


 


Output Total 0 850 


 


Balance 236 -850 476


 


Intake:   


 


  Oral 236  476


 


Output:   


 


  Urine 0 850 


 


  Stool 0  


 


Other:   


 


  Voiding Method Toilet Toilet 





 Urinal  


 


  # Voids 0 1 1


 


  # Bowel Movements 0  


 


  Weight  71.3 kg 














- Constitutional


General appearance: no acute distress





- Respiratory


Respiratory: bilateral: diminished





- Cardiovascular


Rhythm: irregularly irregular


Heart sounds: normal: S1, S2





Results





 18 05:55





 18 05:55


 Cardiac Enzymes











  18 Range/Units





  05:55 


 


AST  24  (17-59)  U/L








 Coagulation











  18 Range/Units





  05:55 


 


PT  11.2  (9.0-12.0)  sec








 CBC











  18 Range/Units





  05:55 


 


WBC  9.0  (3.8-10.6)  k/uL


 


RBC  3.90 L  (4.30-5.90)  m/uL


 


Hgb  10.4 L  (13.0-17.5)  gm/dL


 


Hct  33.9 L  (39.0-53.0)  %


 


Plt Count  179  (150-450)  k/uL








 Comprehensive Metabolic Panel











  18 Range/Units





  05:55 


 


Sodium  137  (137-145)  mmol/L


 


Potassium  4.8  (3.5-5.1)  mmol/L


 


Chloride  97 L  ()  mmol/L


 


Carbon Dioxide  33 H  (22-30)  mmol/L


 


BUN  30 H  (9-20)  mg/dL


 


Creatinine  0.69  (0.66-1.25)  mg/dL


 


Glucose  170 H  (74-99)  mg/dL


 


Calcium  8.6  (8.4-10.2)  mg/dL


 


AST  24  (17-59)  U/L


 


ALT  33  (21-72)  U/L


 


Alkaline Phosphatase  72  ()  U/L


 


Total Protein  5.2 L  (6.3-8.2)  g/dL


 


Albumin  3.0 L  (3.5-5.0)  g/dL








 Current Medications











Generic Name Dose Route Start Last Admin





  Trade Name Freq  PRN Reason Stop Dose Admin


 


Acetaminophen  500 mg  18 06:56  





  Tylenol Tab  PO   





  Q4HR PRN   





  Fever and/ or Pain   





     





     





     


 


Albuterol/Ipratropium  3 ml  18 06:56  18 03:45





  Duoneb 0.5 Mg-3 Mg/3 Ml Soln  INHALATION   3 ml





  RT-Q2H PRN   Administration





  Shortness Of Breath Or Wheezing   





     





     





     


 


Albuterol/Ipratropium  3 ml  18 08:00  18 11:31





  Duoneb 0.5 Mg-3 Mg/3 Ml Soln  INHALATION   3 ml





  RT-QID RHONDA   Administration





     





     





     





     


 


Amiodarone HCl  400 mg  18 16:00  





  Cordarone  PO   





  BID RHONDA   





     





     





     





     


 


Aspirin  325 mg  18 17:00  18 17:35





  Aspirin  PO   325 mg





  DAILY@1700 RHONDA   Administration





     





     





     





     


 


Atorvastatin Calcium  40 mg  18 21:00  18 21:02





  Lipitor  PO   40 mg





  HS@2100 RHONDA   Administration





     





     





     





     


 


Bisacodyl  10 mg  18 06:56  





  Dulcolax  RECTAL   





  DAILY PRN   





  Constipation   





     





     





     


 


Budesonide  1 mg  18 10:00  18 07:38





  Pulmicort  INHALATION   1 mg





  RT-BID RHONDA   Administration





     





     





     





     


 


Docusate Sodium  100 mg  18 08:00  18 08:31





  Colace  PO   100 mg





  BID@0800,2100 [facility]   Administration





     





     





     





     


 


Formoterol Fumarate  20 mcg  18 10:00  18 07:38





  Perforomist  INHALATION   20 mcg





  RT-BID RHONDA   Administration





     





     





     





     


 


Furosemide  40 mg  18 06:00  18 05:52





  Lasix  PO   40 mg





  DAILY@0600 RHONDA   Administration





     





     





     





     


 


Gabapentin  100 mg  18 14:00  18 05:52





  Neurontin  PO   100 mg





  TID@0600,1400,2100 RHONDA   Administration





     





     





     





     


 


Glimepiride  0.5 mg  18 07:30  18 07:34





  Amaryl  PO   0.5 mg





  AC-BRKFST RHONDA   Administration





     





     





     





     


 


Guaifenesin  600 mg  18 08:00  18 08:31





  Mucinex  PO   600 mg





  BID@0800,2100 RHONDA   Administration





     





     





     





     


 


Heparin Sodium (Porcine)  5,000 unit  18 08:00  18 12:14





  Heparin  SQ   5,000 unit





  Q8HR RHONDA   Administration





     





     





     





     


 


Piperacillin/Tazobactam/  50 mls @ 12.5 mls/hr  18 16:00  18 08:31





  Dextrose 3.375 gm/ IV Solution  IVPB   12.5 mls/hr





  Q8HR RHONDA   Administration





     





     





     





     


 


Levofloxacin 750 mg/ IV  150 mls @ 100 mls/hr  18 06:00  18 05:53





  Solution  IVPB  18 06:01  100 mls/hr





  Q24H RHONDA   Administration





     





     





     





     


 


Amiodarone HCl 450 mg/  250 mls @ 16.66 mls/hr  18 08:00  18 08:30





  Dextrose/Water  IV  18 08:01  0.5 mg/min





  .Q15H1M RHONDA   16.66 mls/hr





     Administration





     





  Protocol   





  0.5 MG/MIN   


 


Insulin Aspart  0 unit  18 12:30  18 12:14





  Novolog  SQ   1 unit





  ACHS RHONDA   Administration





     





     





  Protocol   





     


 


Magnesium Hydroxide  2,400 mg  18 06:56  18 23:01





  Milk Of Magnesia  PO   2,400 mg





  BID PRN   Administration





  Constipation   





     





     





     


 


Melatonin  2 mg  18 21:00  18 22:36





  Melatonin  PO   2 mg





  HS@2100 RHONDA   Administration





     





     





     





     


 


Methylprednisolone Sodium Succinate  40 mg  18 18:00  18 12:14





  Solu-Medrol  IV   40 mg





  Q6HR RHONDA   Administration





     





     





     





     


 


Metoprolol Succinate  50 mg  18 09:00  18 08:31





  Toprol Xl  PO   50 mg





  BID RHONDA   Administration





     





     





     





     


 


Miscellaneous Information  1 each  18 05:53  





  Pneumonia Protocol Utilized  PO   





  ONCE PRN   





  Per Protocol   





     





     





     


 


Pantoprazole Sodium  40 mg  18 06:00  18 05:53





  Protonix  PO   40 mg





  DAILY@0600 RHONDA   Administration





     





     





     





     


 


Tamsulosin HCl  0.4 mg  18 21:00  18 21:01





  Flomax  PO   0.4 mg





  HS RHONDA   Administration





     





     





     





     


 


Warfarin Sodium  5 mg  18 18:00  





  Coumadin  PO  18 18:01  





  ONCE@1800 ONE   





     





     





     





     








 Intake and Output











 18





 22:59 06:59 14:59


 


Intake Total 236  476


 


Output Total 0 850 


 


Balance 236 -850 476


 


Intake:   


 


  Oral 236  476


 


Output:   


 


  Urine 0 850 


 


  Stool 0  


 


Other:   


 


  Voiding Method Toilet Toilet 





 Urinal  


 


  # Voids 0 1 1


 


  # Bowel Movements 0  


 


  Weight  71.3 kg 








 





 18 05:55 





 18 05:55 











Assessment and Plan


Assessment: 


 


#1 acute on chronic respiratory failure


#2 large left pleural effusion and status post pleurocentesis


#3 status post coronary artery bypass grafting as described above


#4 recurrent nonsustained V. tach


#5 mild ischemic cardiomyopathy with EF of 45%


#6 chronic atrial fibrillation was controlled heart rate


#7 multiple comorbid conditions including hypertension and dyslipidemia





Plan


#1 the patient underwent left pleurocentesis yesterday.  He is feeling better 

indeterminable shortness of breath


#2 I am going to obtain an echocardiogram was Doppler to assess ejection 

fraction after the surgery


#3 switch the patient from amiodarone IV to amiodarone by mouth


#4 continue the current dose of metoprolol


#5 follow-up with the patient.





Thank you for allowing us participate his care and we'll continue following up 

with him

## 2018-07-19 NOTE — XR
EXAMINATION TYPE: XR chest 2V

 

DATE OF EXAM: 7/19/2018

 

COMPARISON: Prior chest 7/18/2018

 

HISTORY: Pleural effusion

 

TECHNIQUE:  Frontal and lateral views of the chest are obtained.

 

FINDINGS:  Minimal blunting of the left costophrenic angle, bibasilar increased attenuation noted. No
 pneumothorax. Postop, postprocedural changes are stable. Interstitium mildly increased, prominent roc
ng lines suggest COPD.

 

IMPRESSION:  Correlate for pneumonia, pleural effusion. Congestive heart failure patient with pre-exi
sting COPD is not excluded. Follow-up recommended.

## 2018-07-20 LAB
ANION GAP SERPL CALC-SCNC: 7 MMOL/L
BASOPHILS # BLD AUTO: 0 K/UL (ref 0–0.2)
BASOPHILS NFR BLD AUTO: 0 %
BUN SERPL-SCNC: 34 MG/DL (ref 9–20)
CALCIUM SPEC-MCNC: 8.9 MG/DL (ref 8.4–10.2)
CHLORIDE SERPL-SCNC: 98 MMOL/L (ref 98–107)
CO2 SERPL-SCNC: 30 MMOL/L (ref 22–30)
EOSINOPHIL # BLD AUTO: 0 K/UL (ref 0–0.7)
EOSINOPHIL NFR BLD AUTO: 0 %
ERYTHROCYTE [DISTWIDTH] IN BLOOD BY AUTOMATED COUNT: 4 M/UL (ref 4.3–5.9)
ERYTHROCYTE [DISTWIDTH] IN BLOOD: 15.8 % (ref 11.5–15.5)
GLUCOSE BLD-MCNC: 153 MG/DL (ref 75–99)
GLUCOSE BLD-MCNC: 182 MG/DL (ref 75–99)
GLUCOSE BLD-MCNC: 214 MG/DL (ref 75–99)
GLUCOSE BLD-MCNC: 221 MG/DL (ref 75–99)
GLUCOSE SERPL-MCNC: 155 MG/DL (ref 74–99)
HCT VFR BLD AUTO: 34.7 % (ref 39–53)
HGB BLD-MCNC: 10.4 GM/DL (ref 13–17.5)
INR PPP: 1.2 (ref ?–1.2)
LYMPHOCYTES # SPEC AUTO: 0.3 K/UL (ref 1–4.8)
LYMPHOCYTES NFR SPEC AUTO: 3 %
MCH RBC QN AUTO: 25.9 PG (ref 25–35)
MCHC RBC AUTO-ENTMCNC: 29.9 G/DL (ref 31–37)
MCV RBC AUTO: 86.8 FL (ref 80–100)
MONOCYTES # BLD AUTO: 0.4 K/UL (ref 0–1)
MONOCYTES NFR BLD AUTO: 4 %
NEUTROPHILS # BLD AUTO: 9.3 K/UL (ref 1.3–7.7)
NEUTROPHILS NFR BLD AUTO: 92 %
PLATELET # BLD AUTO: 195 K/UL (ref 150–450)
POTASSIUM SERPL-SCNC: 4.1 MMOL/L (ref 3.5–5.1)
PT BLD: 11.3 SEC (ref 9–12)
SODIUM SERPL-SCNC: 135 MMOL/L (ref 137–145)
WBC # BLD AUTO: 10.1 K/UL (ref 3.8–10.6)

## 2018-07-20 RX ADMIN — FORMOTEROL FUMARATE DIHYDRATE SCH MCG: 20 SOLUTION RESPIRATORY (INHALATION) at 09:02

## 2018-07-20 RX ADMIN — METHYLPREDNISOLONE SODIUM SUCCINATE SCH MG: 40 INJECTION, POWDER, FOR SOLUTION INTRAMUSCULAR; INTRAVENOUS at 11:23

## 2018-07-20 RX ADMIN — GABAPENTIN SCH MG: 100 CAPSULE ORAL at 15:50

## 2018-07-20 RX ADMIN — ATORVASTATIN CALCIUM SCH MG: 40 TABLET, FILM COATED ORAL at 20:06

## 2018-07-20 RX ADMIN — FORMOTEROL FUMARATE DIHYDRATE SCH MCG: 20 SOLUTION RESPIRATORY (INHALATION) at 19:31

## 2018-07-20 RX ADMIN — IPRATROPIUM BROMIDE AND ALBUTEROL SULFATE SCH ML: .5; 3 SOLUTION RESPIRATORY (INHALATION) at 09:03

## 2018-07-20 RX ADMIN — DEXTROSE MONOHYDRATE SCH MLS/HR: 5 INJECTION, SOLUTION INTRAVENOUS at 08:20

## 2018-07-20 RX ADMIN — BUDESONIDE SCH MG: 1 SUSPENSION RESPIRATORY (INHALATION) at 19:31

## 2018-07-20 RX ADMIN — IPRATROPIUM BROMIDE AND ALBUTEROL SULFATE SCH ML: .5; 3 SOLUTION RESPIRATORY (INHALATION) at 19:31

## 2018-07-20 RX ADMIN — AMIODARONE HYDROCHLORIDE SCH MG: 200 TABLET ORAL at 20:06

## 2018-07-20 RX ADMIN — FUROSEMIDE SCH MG: 40 TABLET ORAL at 06:33

## 2018-07-20 RX ADMIN — PANTOPRAZOLE SODIUM SCH MG: 40 TABLET, DELAYED RELEASE ORAL at 06:33

## 2018-07-20 RX ADMIN — DOCUSATE SODIUM SCH MG: 100 CAPSULE, LIQUID FILLED ORAL at 08:22

## 2018-07-20 RX ADMIN — METOPROLOL SUCCINATE SCH MG: 50 TABLET, EXTENDED RELEASE ORAL at 20:07

## 2018-07-20 RX ADMIN — TAMSULOSIN HYDROCHLORIDE SCH MG: 0.4 CAPSULE ORAL at 20:07

## 2018-07-20 RX ADMIN — IPRATROPIUM BROMIDE AND ALBUTEROL SULFATE SCH ML: .5; 3 SOLUTION RESPIRATORY (INHALATION) at 16:06

## 2018-07-20 RX ADMIN — WARFARIN SODIUM SCH MG: 5 TABLET ORAL at 16:49

## 2018-07-20 RX ADMIN — METHYLPREDNISOLONE SODIUM SUCCINATE SCH MG: 40 INJECTION, POWDER, FOR SOLUTION INTRAMUSCULAR; INTRAVENOUS at 06:33

## 2018-07-20 RX ADMIN — GLIMEPIRIDE SCH MG: 1 TABLET ORAL at 06:33

## 2018-07-20 RX ADMIN — INSULIN ASPART SCH UNIT: 100 INJECTION, SOLUTION INTRAVENOUS; SUBCUTANEOUS at 06:35

## 2018-07-20 RX ADMIN — Medication SCH MG: at 21:10

## 2018-07-20 RX ADMIN — INSULIN ASPART SCH UNIT: 100 INJECTION, SOLUTION INTRAVENOUS; SUBCUTANEOUS at 12:17

## 2018-07-20 RX ADMIN — ASPIRIN 325 MG ORAL TABLET SCH MG: 325 PILL ORAL at 15:51

## 2018-07-20 RX ADMIN — BUDESONIDE SCH MG: 1 SUSPENSION RESPIRATORY (INHALATION) at 09:02

## 2018-07-20 RX ADMIN — AMIODARONE HYDROCHLORIDE SCH MG: 200 TABLET ORAL at 08:22

## 2018-07-20 RX ADMIN — HEPARIN SODIUM SCH UNIT: 5000 INJECTION, SOLUTION INTRAVENOUS; SUBCUTANEOUS at 08:21

## 2018-07-20 RX ADMIN — INSULIN ASPART SCH UNIT: 100 INJECTION, SOLUTION INTRAVENOUS; SUBCUTANEOUS at 17:28

## 2018-07-20 RX ADMIN — IPRATROPIUM BROMIDE AND ALBUTEROL SULFATE PRN ML: .5; 3 SOLUTION RESPIRATORY (INHALATION) at 05:41

## 2018-07-20 RX ADMIN — GABAPENTIN SCH MG: 100 CAPSULE ORAL at 20:07

## 2018-07-20 RX ADMIN — HEPARIN SODIUM SCH UNIT: 5000 INJECTION, SOLUTION INTRAVENOUS; SUBCUTANEOUS at 15:50

## 2018-07-20 RX ADMIN — METOPROLOL SUCCINATE SCH MG: 50 TABLET, EXTENDED RELEASE ORAL at 08:21

## 2018-07-20 RX ADMIN — DOCUSATE SODIUM SCH MG: 100 CAPSULE, LIQUID FILLED ORAL at 20:06

## 2018-07-20 RX ADMIN — IPRATROPIUM BROMIDE AND ALBUTEROL SULFATE SCH ML: .5; 3 SOLUTION RESPIRATORY (INHALATION) at 11:53

## 2018-07-20 RX ADMIN — HEPARIN SODIUM SCH UNIT: 5000 INJECTION, SOLUTION INTRAVENOUS; SUBCUTANEOUS at 23:16

## 2018-07-20 RX ADMIN — AMOXICILLIN AND CLAVULANATE POTASSIUM SCH EACH: 875; 125 TABLET, FILM COATED ORAL at 20:06

## 2018-07-20 RX ADMIN — INSULIN ASPART SCH UNIT: 100 INJECTION, SOLUTION INTRAVENOUS; SUBCUTANEOUS at 21:11

## 2018-07-20 RX ADMIN — GABAPENTIN SCH MG: 100 CAPSULE ORAL at 06:33

## 2018-07-20 NOTE — XR
EXAMINATION TYPE: XR chest 2V

 

DATE OF EXAM: 7/20/2018

 

COMPARISON: Prior chest 7/19/2018

 

HISTORY: Pleural effusion, cough and abnormal chest x-ray

 

TECHNIQUE:  Frontal and lateral views of the chest are obtained.

 

FINDINGS:  There is some improvement in aeration at the right lung base. Persistent pleural effusion 
noted on the left. No other significant interval change. Postop changes are noted, coronary artery ca
lcifications are present. Prominent lung lines are compatible with underlying emphysema.

 

IMPRESSION:  Improvement in aeration right lung base. Small left pleural effusion persists.

## 2018-07-20 NOTE — ECHOF
Referral Reason:VT



MEASUREMENTS

--------

HEIGHT: 172.7 cm

WEIGHT: 71.2 kg

BP: 

IVSd:   1.1 cm     (0.6 - 1.1)

LVIDd:   4.3 cm     (3.9 - 5.3)

LVPWd:   0.9 cm     (0.6 - 1.1)

IVSs:   1.2 cm

LVIDs:   3.5 cm

LVPWs:   1.3 cm







FINDINGS

--------

Paced rhythm.

Limited Study to follow up Post CABG, LV Function.

Overall left ventricular systolic function is mild-moderately impaired with, an EF between 40 - 45 %.
   Inferior Hypokinesis



CONCLUSIONS

--------

1. Paced rhythm.

2. Limited Study to follow up Post CABG, LV Function.

3. Overall left ventricular systolic function is mild-moderately impaired with, an EF between 40 - 45
 %.

4. Inferior Hypokinesis





SONOGRAPHER: Alisia Johnson RDCS

## 2018-07-21 LAB
ANION GAP SERPL CALC-SCNC: 6 MMOL/L
BASOPHILS # BLD AUTO: 0 K/UL (ref 0–0.2)
BASOPHILS NFR BLD AUTO: 0 %
BUN SERPL-SCNC: 33 MG/DL (ref 9–20)
CALCIUM SPEC-MCNC: 8.6 MG/DL (ref 8.4–10.2)
CHLORIDE SERPL-SCNC: 98 MMOL/L (ref 98–107)
CO2 SERPL-SCNC: 34 MMOL/L (ref 22–30)
EOSINOPHIL # BLD AUTO: 0 K/UL (ref 0–0.7)
EOSINOPHIL NFR BLD AUTO: 0 %
ERYTHROCYTE [DISTWIDTH] IN BLOOD BY AUTOMATED COUNT: 3.98 M/UL (ref 4.3–5.9)
ERYTHROCYTE [DISTWIDTH] IN BLOOD: 15.7 % (ref 11.5–15.5)
GLUCOSE BLD-MCNC: 100 MG/DL (ref 75–99)
GLUCOSE BLD-MCNC: 127 MG/DL (ref 75–99)
GLUCOSE BLD-MCNC: 190 MG/DL (ref 75–99)
GLUCOSE BLD-MCNC: 195 MG/DL (ref 75–99)
GLUCOSE BLD-MCNC: 275 MG/DL (ref 75–99)
GLUCOSE SERPL-MCNC: 91 MG/DL (ref 74–99)
HCT VFR BLD AUTO: 34.4 % (ref 39–53)
HGB BLD-MCNC: 10.4 GM/DL (ref 13–17.5)
INR PPP: 1.4 (ref ?–1.2)
LYMPHOCYTES # SPEC AUTO: 0.6 K/UL (ref 1–4.8)
LYMPHOCYTES NFR SPEC AUTO: 8 %
MCH RBC QN AUTO: 26.1 PG (ref 25–35)
MCHC RBC AUTO-ENTMCNC: 30.2 G/DL (ref 31–37)
MCV RBC AUTO: 86.4 FL (ref 80–100)
MONOCYTES # BLD AUTO: 0.6 K/UL (ref 0–1)
MONOCYTES NFR BLD AUTO: 7 %
NEUTROPHILS # BLD AUTO: 6.9 K/UL (ref 1.3–7.7)
NEUTROPHILS NFR BLD AUTO: 83 %
PLATELET # BLD AUTO: 191 K/UL (ref 150–450)
POTASSIUM SERPL-SCNC: 4.2 MMOL/L (ref 3.5–5.1)
PT BLD: 13.5 SEC (ref 9–12)
SODIUM SERPL-SCNC: 138 MMOL/L (ref 137–145)
WBC # BLD AUTO: 8.3 K/UL (ref 3.8–10.6)

## 2018-07-21 RX ADMIN — IPRATROPIUM BROMIDE AND ALBUTEROL SULFATE SCH ML: .5; 3 SOLUTION RESPIRATORY (INHALATION) at 11:22

## 2018-07-21 RX ADMIN — INSULIN ASPART SCH UNIT: 100 INJECTION, SOLUTION INTRAVENOUS; SUBCUTANEOUS at 20:30

## 2018-07-21 RX ADMIN — HEPARIN SODIUM SCH UNIT: 5000 INJECTION, SOLUTION INTRAVENOUS; SUBCUTANEOUS at 08:39

## 2018-07-21 RX ADMIN — AMIODARONE HYDROCHLORIDE SCH MG: 200 TABLET ORAL at 08:39

## 2018-07-21 RX ADMIN — IPRATROPIUM BROMIDE AND ALBUTEROL SULFATE SCH ML: .5; 3 SOLUTION RESPIRATORY (INHALATION) at 16:09

## 2018-07-21 RX ADMIN — FORMOTEROL FUMARATE DIHYDRATE SCH MCG: 20 SOLUTION RESPIRATORY (INHALATION) at 19:36

## 2018-07-21 RX ADMIN — GABAPENTIN SCH MG: 100 CAPSULE ORAL at 21:47

## 2018-07-21 RX ADMIN — FUROSEMIDE SCH MG: 40 TABLET ORAL at 06:40

## 2018-07-21 RX ADMIN — METOPROLOL SUCCINATE SCH MG: 50 TABLET, EXTENDED RELEASE ORAL at 20:30

## 2018-07-21 RX ADMIN — HEPARIN SODIUM SCH UNIT: 5000 INJECTION, SOLUTION INTRAVENOUS; SUBCUTANEOUS at 23:53

## 2018-07-21 RX ADMIN — DOCUSATE SODIUM SCH: 100 CAPSULE, LIQUID FILLED ORAL at 20:26

## 2018-07-21 RX ADMIN — ASPIRIN 325 MG ORAL TABLET SCH MG: 325 PILL ORAL at 16:55

## 2018-07-21 RX ADMIN — WARFARIN SODIUM SCH MG: 5 TABLET ORAL at 16:55

## 2018-07-21 RX ADMIN — HEPARIN SODIUM SCH UNIT: 5000 INJECTION, SOLUTION INTRAVENOUS; SUBCUTANEOUS at 16:55

## 2018-07-21 RX ADMIN — AMOXICILLIN AND CLAVULANATE POTASSIUM SCH EACH: 875; 125 TABLET, FILM COATED ORAL at 20:30

## 2018-07-21 RX ADMIN — BUDESONIDE SCH MG: 1 SUSPENSION RESPIRATORY (INHALATION) at 19:35

## 2018-07-21 RX ADMIN — PANTOPRAZOLE SODIUM SCH MG: 40 TABLET, DELAYED RELEASE ORAL at 06:40

## 2018-07-21 RX ADMIN — ATORVASTATIN CALCIUM SCH MG: 40 TABLET, FILM COATED ORAL at 20:30

## 2018-07-21 RX ADMIN — IPRATROPIUM BROMIDE AND ALBUTEROL SULFATE SCH ML: .5; 3 SOLUTION RESPIRATORY (INHALATION) at 19:35

## 2018-07-21 RX ADMIN — GABAPENTIN SCH MG: 100 CAPSULE ORAL at 14:34

## 2018-07-21 RX ADMIN — AMOXICILLIN AND CLAVULANATE POTASSIUM SCH EACH: 875; 125 TABLET, FILM COATED ORAL at 08:39

## 2018-07-21 RX ADMIN — DOCUSATE SODIUM SCH: 100 CAPSULE, LIQUID FILLED ORAL at 08:40

## 2018-07-21 RX ADMIN — BUDESONIDE SCH MG: 1 SUSPENSION RESPIRATORY (INHALATION) at 07:41

## 2018-07-21 RX ADMIN — IPRATROPIUM BROMIDE AND ALBUTEROL SULFATE PRN ML: .5; 3 SOLUTION RESPIRATORY (INHALATION) at 04:27

## 2018-07-21 RX ADMIN — GABAPENTIN SCH MG: 100 CAPSULE ORAL at 06:40

## 2018-07-21 RX ADMIN — FORMOTEROL FUMARATE DIHYDRATE SCH MCG: 20 SOLUTION RESPIRATORY (INHALATION) at 07:41

## 2018-07-21 RX ADMIN — INSULIN ASPART SCH UNIT: 100 INJECTION, SOLUTION INTRAVENOUS; SUBCUTANEOUS at 17:42

## 2018-07-21 RX ADMIN — INSULIN ASPART SCH: 100 INJECTION, SOLUTION INTRAVENOUS; SUBCUTANEOUS at 05:39

## 2018-07-21 RX ADMIN — TAMSULOSIN HYDROCHLORIDE SCH MG: 0.4 CAPSULE ORAL at 20:30

## 2018-07-21 RX ADMIN — INSULIN ASPART SCH UNIT: 100 INJECTION, SOLUTION INTRAVENOUS; SUBCUTANEOUS at 12:09

## 2018-07-21 RX ADMIN — AMIODARONE HYDROCHLORIDE SCH MG: 200 TABLET ORAL at 20:30

## 2018-07-21 RX ADMIN — GLIMEPIRIDE SCH MG: 1 TABLET ORAL at 06:40

## 2018-07-21 RX ADMIN — IPRATROPIUM BROMIDE AND ALBUTEROL SULFATE SCH ML: .5; 3 SOLUTION RESPIRATORY (INHALATION) at 07:41

## 2018-07-21 RX ADMIN — Medication SCH MG: at 20:30

## 2018-07-21 RX ADMIN — METOPROLOL SUCCINATE SCH MG: 50 TABLET, EXTENDED RELEASE ORAL at 08:41

## 2018-07-22 LAB
GLUCOSE BLD-MCNC: 123 MG/DL (ref 75–99)
GLUCOSE BLD-MCNC: 190 MG/DL (ref 75–99)
GLUCOSE BLD-MCNC: 220 MG/DL (ref 75–99)
GLUCOSE BLD-MCNC: 99 MG/DL (ref 75–99)
INR PPP: 2.1 (ref ?–1.2)
PT BLD: 19.4 SEC (ref 9–12)

## 2018-07-22 RX ADMIN — GABAPENTIN SCH MG: 100 CAPSULE ORAL at 05:42

## 2018-07-22 RX ADMIN — INSULIN ASPART SCH UNIT: 100 INJECTION, SOLUTION INTRAVENOUS; SUBCUTANEOUS at 17:35

## 2018-07-22 RX ADMIN — AMIODARONE HYDROCHLORIDE SCH MG: 200 TABLET ORAL at 09:02

## 2018-07-22 RX ADMIN — DOCUSATE SODIUM SCH MG: 100 CAPSULE, LIQUID FILLED ORAL at 20:14

## 2018-07-22 RX ADMIN — IPRATROPIUM BROMIDE AND ALBUTEROL SULFATE PRN ML: .5; 3 SOLUTION RESPIRATORY (INHALATION) at 03:13

## 2018-07-22 RX ADMIN — FORMOTEROL FUMARATE DIHYDRATE SCH MCG: 20 SOLUTION RESPIRATORY (INHALATION) at 20:48

## 2018-07-22 RX ADMIN — Medication SCH MG: at 20:13

## 2018-07-22 RX ADMIN — AMOXICILLIN AND CLAVULANATE POTASSIUM SCH EACH: 875; 125 TABLET, FILM COATED ORAL at 20:15

## 2018-07-22 RX ADMIN — HEPARIN SODIUM SCH UNIT: 5000 INJECTION, SOLUTION INTRAVENOUS; SUBCUTANEOUS at 16:27

## 2018-07-22 RX ADMIN — ATORVASTATIN CALCIUM SCH MG: 40 TABLET, FILM COATED ORAL at 20:14

## 2018-07-22 RX ADMIN — DOCUSATE SODIUM SCH: 100 CAPSULE, LIQUID FILLED ORAL at 09:07

## 2018-07-22 RX ADMIN — BUDESONIDE SCH MG: 1 SUSPENSION RESPIRATORY (INHALATION) at 20:48

## 2018-07-22 RX ADMIN — ASPIRIN 325 MG ORAL TABLET SCH MG: 325 PILL ORAL at 17:36

## 2018-07-22 RX ADMIN — PANTOPRAZOLE SODIUM SCH MG: 40 TABLET, DELAYED RELEASE ORAL at 05:42

## 2018-07-22 RX ADMIN — FORMOTEROL FUMARATE DIHYDRATE SCH MCG: 20 SOLUTION RESPIRATORY (INHALATION) at 07:31

## 2018-07-22 RX ADMIN — TAMSULOSIN HYDROCHLORIDE SCH MG: 0.4 CAPSULE ORAL at 20:14

## 2018-07-22 RX ADMIN — IPRATROPIUM BROMIDE AND ALBUTEROL SULFATE SCH ML: .5; 3 SOLUTION RESPIRATORY (INHALATION) at 20:48

## 2018-07-22 RX ADMIN — METOPROLOL SUCCINATE SCH MG: 50 TABLET, EXTENDED RELEASE ORAL at 09:02

## 2018-07-22 RX ADMIN — WARFARIN SODIUM SCH MG: 5 TABLET ORAL at 17:36

## 2018-07-22 RX ADMIN — DOCUSATE SODIUM SCH MG: 100 CAPSULE, LIQUID FILLED ORAL at 09:03

## 2018-07-22 RX ADMIN — HEPARIN SODIUM SCH UNIT: 5000 INJECTION, SOLUTION INTRAVENOUS; SUBCUTANEOUS at 09:03

## 2018-07-22 RX ADMIN — GABAPENTIN SCH MG: 100 CAPSULE ORAL at 20:14

## 2018-07-22 RX ADMIN — INSULIN ASPART SCH UNIT: 100 INJECTION, SOLUTION INTRAVENOUS; SUBCUTANEOUS at 20:15

## 2018-07-22 RX ADMIN — AMOXICILLIN AND CLAVULANATE POTASSIUM SCH EACH: 875; 125 TABLET, FILM COATED ORAL at 09:02

## 2018-07-22 RX ADMIN — BUDESONIDE SCH MG: 1 SUSPENSION RESPIRATORY (INHALATION) at 07:31

## 2018-07-22 RX ADMIN — INSULIN ASPART SCH: 100 INJECTION, SOLUTION INTRAVENOUS; SUBCUTANEOUS at 09:03

## 2018-07-22 RX ADMIN — FUROSEMIDE SCH MG: 40 TABLET ORAL at 05:42

## 2018-07-22 RX ADMIN — METOPROLOL SUCCINATE SCH MG: 50 TABLET, EXTENDED RELEASE ORAL at 20:14

## 2018-07-22 RX ADMIN — INSULIN ASPART SCH: 100 INJECTION, SOLUTION INTRAVENOUS; SUBCUTANEOUS at 12:25

## 2018-07-22 RX ADMIN — IPRATROPIUM BROMIDE AND ALBUTEROL SULFATE SCH ML: .5; 3 SOLUTION RESPIRATORY (INHALATION) at 11:30

## 2018-07-22 RX ADMIN — GLIMEPIRIDE SCH MG: 1 TABLET ORAL at 09:03

## 2018-07-22 RX ADMIN — AMIODARONE HYDROCHLORIDE SCH MG: 200 TABLET ORAL at 20:14

## 2018-07-22 RX ADMIN — IPRATROPIUM BROMIDE AND ALBUTEROL SULFATE SCH ML: .5; 3 SOLUTION RESPIRATORY (INHALATION) at 07:31

## 2018-07-22 RX ADMIN — GABAPENTIN SCH MG: 100 CAPSULE ORAL at 13:01

## 2018-07-22 RX ADMIN — IPRATROPIUM BROMIDE AND ALBUTEROL SULFATE SCH ML: .5; 3 SOLUTION RESPIRATORY (INHALATION) at 16:05

## 2018-07-23 LAB
GLUCOSE BLD-MCNC: 128 MG/DL (ref 75–99)
GLUCOSE BLD-MCNC: 164 MG/DL (ref 75–99)
GLUCOSE BLD-MCNC: 192 MG/DL (ref 75–99)
GLUCOSE BLD-MCNC: 270 MG/DL (ref 75–99)
GLUCOSE BLD-MCNC: 87 MG/DL (ref 75–99)
INR PPP: 2.7 (ref ?–1.2)
PT BLD: 24.1 SEC (ref 9–12)

## 2018-07-23 RX ADMIN — PANTOPRAZOLE SODIUM SCH MG: 40 TABLET, DELAYED RELEASE ORAL at 05:48

## 2018-07-23 RX ADMIN — AMOXICILLIN AND CLAVULANATE POTASSIUM SCH EACH: 875; 125 TABLET, FILM COATED ORAL at 11:15

## 2018-07-23 RX ADMIN — GLIMEPIRIDE SCH MG: 1 TABLET ORAL at 07:55

## 2018-07-23 RX ADMIN — TAMSULOSIN HYDROCHLORIDE SCH MG: 0.4 CAPSULE ORAL at 20:37

## 2018-07-23 RX ADMIN — DOCUSATE SODIUM SCH: 100 CAPSULE, LIQUID FILLED ORAL at 07:57

## 2018-07-23 RX ADMIN — DOCUSATE SODIUM SCH MG: 100 CAPSULE, LIQUID FILLED ORAL at 20:36

## 2018-07-23 RX ADMIN — INSULIN ASPART SCH UNIT: 100 INJECTION, SOLUTION INTRAVENOUS; SUBCUTANEOUS at 17:16

## 2018-07-23 RX ADMIN — IPRATROPIUM BROMIDE AND ALBUTEROL SULFATE PRN ML: .5; 3 SOLUTION RESPIRATORY (INHALATION) at 03:34

## 2018-07-23 RX ADMIN — METOPROLOL SUCCINATE SCH MG: 50 TABLET, EXTENDED RELEASE ORAL at 07:55

## 2018-07-23 RX ADMIN — Medication SCH MG: at 20:36

## 2018-07-23 RX ADMIN — IPRATROPIUM BROMIDE AND ALBUTEROL SULFATE SCH ML: .5; 3 SOLUTION RESPIRATORY (INHALATION) at 15:52

## 2018-07-23 RX ADMIN — FUROSEMIDE SCH MG: 40 TABLET ORAL at 05:48

## 2018-07-23 RX ADMIN — BUDESONIDE SCH MG: 1 SUSPENSION RESPIRATORY (INHALATION) at 08:25

## 2018-07-23 RX ADMIN — ASPIRIN 325 MG ORAL TABLET SCH MG: 325 PILL ORAL at 16:02

## 2018-07-23 RX ADMIN — AMIODARONE HYDROCHLORIDE SCH MG: 200 TABLET ORAL at 07:55

## 2018-07-23 RX ADMIN — HEPARIN SODIUM SCH UNIT: 5000 INJECTION, SOLUTION INTRAVENOUS; SUBCUTANEOUS at 00:49

## 2018-07-23 RX ADMIN — FORMOTEROL FUMARATE DIHYDRATE SCH MCG: 20 SOLUTION RESPIRATORY (INHALATION) at 08:25

## 2018-07-23 RX ADMIN — IPRATROPIUM BROMIDE AND ALBUTEROL SULFATE SCH ML: .5; 3 SOLUTION RESPIRATORY (INHALATION) at 19:54

## 2018-07-23 RX ADMIN — METOPROLOL SUCCINATE SCH MG: 50 TABLET, EXTENDED RELEASE ORAL at 20:36

## 2018-07-23 RX ADMIN — WARFARIN SODIUM SCH MG: 5 TABLET ORAL at 17:15

## 2018-07-23 RX ADMIN — GABAPENTIN SCH MG: 100 CAPSULE ORAL at 20:36

## 2018-07-23 RX ADMIN — INSULIN ASPART SCH: 100 INJECTION, SOLUTION INTRAVENOUS; SUBCUTANEOUS at 07:21

## 2018-07-23 RX ADMIN — ATORVASTATIN CALCIUM SCH MG: 40 TABLET, FILM COATED ORAL at 20:36

## 2018-07-23 RX ADMIN — GABAPENTIN SCH MG: 100 CAPSULE ORAL at 16:02

## 2018-07-23 RX ADMIN — GABAPENTIN SCH MG: 100 CAPSULE ORAL at 05:48

## 2018-07-23 RX ADMIN — INSULIN ASPART SCH UNIT: 100 INJECTION, SOLUTION INTRAVENOUS; SUBCUTANEOUS at 12:37

## 2018-07-23 RX ADMIN — BUDESONIDE SCH MG: 1 SUSPENSION RESPIRATORY (INHALATION) at 19:52

## 2018-07-23 RX ADMIN — IPRATROPIUM BROMIDE AND ALBUTEROL SULFATE SCH ML: .5; 3 SOLUTION RESPIRATORY (INHALATION) at 11:55

## 2018-07-23 RX ADMIN — AMOXICILLIN AND CLAVULANATE POTASSIUM SCH EACH: 875; 125 TABLET, FILM COATED ORAL at 21:12

## 2018-07-23 RX ADMIN — FORMOTEROL FUMARATE DIHYDRATE SCH MCG: 20 SOLUTION RESPIRATORY (INHALATION) at 19:52

## 2018-07-23 RX ADMIN — INSULIN ASPART SCH UNIT: 100 INJECTION, SOLUTION INTRAVENOUS; SUBCUTANEOUS at 20:35

## 2018-07-23 RX ADMIN — IPRATROPIUM BROMIDE AND ALBUTEROL SULFATE SCH ML: .5; 3 SOLUTION RESPIRATORY (INHALATION) at 08:25

## 2018-07-23 RX ADMIN — AMIODARONE HYDROCHLORIDE SCH MG: 200 TABLET ORAL at 20:36

## 2018-07-23 NOTE — P.DS
Providers


Date of admission: 


07/17/18 06:01





Attending physician: 


Javan Vang





Consults: 





 





07/17/18 05:53


Consult Physician Routine 


   Consulting Provider: Galina Wolfe


   Consult Reason/Comments: Status post bypass


   Do you want consulting provider notified?: Yes





07/17/18 06:00


Consult Physician Routine 


   Consulting Provider: Julisa Del Cid


   Consult Reason/Comments: Pleural effusion


   Do you want consulting provider notified?: Yes





07/17/18 14:58


Consult Physician Routine 


   Consulting Provider: Jayy Warren


   Consult Reason/Comments: US guided left thoracentesis


   Do you want consulting provider notified?: Yes





07/19/18 11:17


Consult Physician Routine 


   Consulting Provider: Brennan Hylton


   Consult Reason/Comments: vtach


   Do you want consulting provider notified?: Yes











Primary care physician: 


Javan Vang


The patient is a 70-year-old gentleman who was transferred from [facility] 

with increasing shortness of breath associated with hemorrhagic sputum and 

decreasing O2 saturations.  Patient had a recent history of coronary artery 

bypass surgery and on admission his chest x-ray was consistent with bilateral 

basilar pneumonias and left pleural effusion.





On presentation his white count was elevated at 17.7 with a hemoglobin 13.1 and 

platelet count 326.


INR was 1.3.  D-dimer was 2.71.


BUN was 31 with a creatinine of 0.7 given him a GFR 90.  Sodium was 137.  Blood 

sugar 112.  Troponin was only 0.031.  BNP was 2480.





Patient did have a angiographic computed tomography scan of the chest which did 

not show evidence of pulmonary embolus but was consistent with right and left 

lung pneumonias.  Along with a left pleural effusion.


He did have atrial fibrillation with rapid ventricular response also.  He does 

have a history of chronic atrial fib.  Also history of severe COPD.





Hospital course


The patient was treated with IV antibiotics and corticosteroids along with 

respiratory treatments.  His Coumadin was continued to be held and he underwen 

a left thoracentesis.  The pathology did not show any malignant cells.  Culture 

on the fluid was no growth.


Patient showed some continued pulmonary improvement but did develop episodes of 

nonsustained ventricular tachycardia.  Consultation was obtained with 

cardiology and he was placed on IV amiodarone and then switched to oral.


Patient also was continued on physical therapy.





Chest x-ray showed improvement and clearing.


Echocardiogram showed a paced rhythm with left ventricular systolic function in 

a mild-moderate way with an ejection fraction of 40-45%.  Inferior hypokinesis.





At this time plans are to transfer back to [facility] rehab with the following 

medications


Acetaminophen 500 mg every 4 hours when necessary


Amiodarone 400 mg twice a day


Augmentin 875-125, 1 tablet twice a day for 5 more days.


Aspirin 325 mg daily


Atorvastatin 40 mg at at bedtime


Dulcolax 10 mg rectal when necessary constipation


Pulmicort 1 mg inhalation twice a day


Colace 100 mg twice a day


Formoterol 20 g inhalation twice a day


Furosemide 40 mg daily


Neurontin 100 mg 3 times a day


Amaryl 0.5 mg before breakfast


Mucinex 600 mg twice a day


DuoNeb respiratory treatments 0.5 mg-3 mg/3 mL's of 4 times a day and when 

necessary shortness of breath


Milk of magnesia twice a day when necessary


Melatonin 2 mg at at bedtime for sleep


Metoprolol succinate 50 mg twice a day


Protonix 40 mg daily


Prednisone 30 mg daily for 5 days.  Then 20 mg daily for 5 days.  Then 10 mg 

daily.


Tamsulosin 0.4 mg at at bedtime


Coumadin 2 mg daily with INRs on Mondays and Thursdays.  With results sent to 

my office.





Discharge diagnosis


Patient with healthcare acquired bilateral basilar pneumonia with underlying 

exacerbation of severe COPD presenting with shortness of breath and hemorrhagic 

sputum and decreasing O2 saturations consistent with acute on chronic 

respiratory failure.


Left pleural effusion, status post thoracentesis and drainage.


Nonsustained ventricular tachycardia, on amiodarone.


Severe chronic COPD.


Chronic atrial fibrillation.


Chronic systolic congestive heart failure with ejection fraction of 40-45%


Coronary artery disease and status post recent coronary artery bypass surgery.


History of sick sinus syndrome with previous placement of placement


Hypertension


Hyperlipidemia


Previous tobacco usage


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 

disc disease


BPH


Gastroesophageal reflux


Recurrent ulcerations of his toes and fingers that have required amputations in 

the past


Type 2 diabetes


Peripheral vascular disease


History of previous surgeries that include the coronary artery bypass, 

appendectomy, cholecystectomy, lumbar surgery, previous amputations of fingers 

and toes, left total knee replacement, gastrectomy for peptic ulcer disease in 

the past.





Patient to continue with physical and occupational therapies.  Diet as 

tolerated.  Follow-up also with pulmonary medicine and thoracic surgery and 

cardiology.





The patient was seen today on 7/23/2018:


Patient is sitting up at the side of the bed.  Eating breakfast.  In no acute 

distress.  No complaints of chest pain or unusual shortness of breath at this 

time.


Vital signs this morning show a temperature of 97.5 with a pulse of 70 and 

respirations 18.  Blood pressure was 107/70 and he was 98% saturated on 2 L.


Lung and heart examination is clear.  Irregular but rate controlled.  No 

unusual edema.  No new neurological changes.





Blood sugar was 87 this morning.  Other labs pending.





Plans: Likely awaiting discharge back to [facility] later today.  Discussed 

with nursing staff and patient this morning.  Medications and orders as listed 

above pending any changes per pulmonary medicine.





Plan - Discharge Summary


Discharge Rx Participant: No


New Discharge Prescriptions: 


No Action


   Tamsulosin HCl [Flomax] 0.4 mg PO DAILY@0800


   Ipratropium-Albuterol Nebulize [Duoneb 0.5 mg-3 mg/3 ml Soln] 3 ml 

INHALATION RT-QID


   Gabapentin [Neurontin] 100 mg PO TID@0600,1400,2100


   Acetaminophen Tab [Tylenol] 500 mg PO Q4HR PRN  tab


     PRN Reason: Fever And/ Or Pain


   Budesonide-Formot 160-4.5 Mcg [Symbicort 160-4.5 Mcg Inhaler] 2 puff 

INHALATION RT-BID  puff


   Ipratropium-Albuterol Nebulize [Duoneb 0.5 mg-3 mg/3 ml Soln] 3 ml 

INHALATION RT-Q2H PRN  ampul.neb


     PRN Reason: Shortness Of Breath Or Wheezing


   Lisinopril [Zestril] 2.5 mg PO DAILY@1200  tab


   Magnesium Hydroxide [Milk of Magnesia Concentrate] 2,400 mg PO BID PRN  ml


     PRN Reason: Constipation


   Metoprolol Succinate (ER) [Toprol XL] 50 mg PO BID  tab.er.24h


   Aspirin 325 mg PO DAILY@1700


   Atorvastatin [Lipitor] 40 mg PO HS@2100


   Bisacodyl [Dulcolax] 10 mg RECTAL DAILY PRN


     PRN Reason: Constipation


   Docusate [Colace] 100 mg PO BID@0800,2100


   Furosemide [Lasix] 40 mg PO DAILY@0600


   Glimepiride [Amaryl] 0.5 mg PO AC-BRKFST


   Glucerna Shake 1 can PO BID@0800,1700


   guaiFENesin [Mucinex] 600 mg PO BID@0800,2100


   Insulin Aspart [NovoLOG (formulary)] See Protocol SQ ACHS


   Melatonin 2 mg PO HS@2100


   Menthol/Zinc Oxide [Calmoseptine Ointment] 1 applic TOPICAL BID@0800,2100


   Na Phos,M-B/Na Phos,Di-Ba [Fleet Adult] 133 ml RECTAL DAILY PRN


     PRN Reason: Constipation


   Pantoprazole [Protonix] 40 mg PO DAILY@0600


   Polyethylene Glycol 3350 [Miralax] 17 gm PO DAILY@0800


   predniSONE 10 mg PO DAILY@0800


   Warfarin [Coumadin] 5 mg PO DAILY@1700


Discharge Medication List





Gabapentin [Neurontin] 100 mg PO TID@0600,1400,2100 06/08/18 [History]


Ipratropium-Albuterol Nebulize [Duoneb 0.5 mg-3 mg/3 ml Soln] 3 ml INHALATION RT

-QID 06/08/18 [History]


Tamsulosin HCl [Flomax] 0.4 mg PO DAILY@0800 06/08/18 [History]


Acetaminophen Tab [Tylenol] 500 mg PO Q4HR PRN  tab 06/18/18 [Rx]


Budesonide-Formot 160-4.5 Mcg [Symbicort 160-4.5 Mcg Inhaler] 2 puff INHALATION 

RT-BID  puff 06/18/18 [Rx]


Ipratropium-Albuterol Nebulize [Duoneb 0.5 mg-3 mg/3 ml Soln] 3 ml INHALATION RT

-Q2H PRN  ampul.neb 06/18/18 [Rx]


Lisinopril [Zestril] 2.5 mg PO DAILY@1200  tab 06/18/18 [Rx]


Magnesium Hydroxide [Milk of Magnesia Concentrate] 2,400 mg PO BID PRN  ml 06/18 /18 [Rx]


Metoprolol Succinate (ER) [Toprol XL] 50 mg PO BID  tab.er.24h 06/18/18 [Rx]


Aspirin 325 mg PO DAILY@1700 07/17/18 [History]


Atorvastatin [Lipitor] 40 mg PO HS@2100 07/17/18 [History]


Bisacodyl [Dulcolax] 10 mg RECTAL DAILY PRN 07/17/18 [History]


Docusate [Colace] 100 mg PO BID@0800,2100 07/17/18 [History]


Furosemide [Lasix] 40 mg PO DAILY@0600 07/17/18 [History]


Glimepiride [Amaryl] 0.5 mg PO AC-BRKFST 07/17/18 [History]


Glucerna Shake 1 can PO BID@0800,1700 07/17/18 [History]


Insulin Aspart [NovoLOG (formulary)] See Protocol SQ ACHS 07/17/18 [History]


Melatonin 2 mg PO HS@2100 07/17/18 [History]


Menthol/Zinc Oxide [Calmoseptine Ointment] 1 applic TOPICAL BID@0800,2100 07/17/ 18 [History]


Na Phos,M-B/Na Phos,Di-Ba [Fleet Adult] 133 ml RECTAL DAILY PRN 07/17/18 [

History]


Pantoprazole [Protonix] 40 mg PO DAILY@0600 07/17/18 [History]


Polyethylene Glycol 3350 [Miralax] 17 gm PO DAILY@0800 07/17/18 [History]


Warfarin [Coumadin] 5 mg PO DAILY@1700 07/17/18 [History]


guaiFENesin [Mucinex] 600 mg PO BID@0800,2100 07/17/18 [History]


predniSONE 10 mg PO DAILY@0800 07/17/18 [History]








Follow up Appointment(s)/Referral(s): 


Javan Vang MD [Primary Care Provider] - 1-2 days


Activity/Diet/Wound Care/Special Instructions: 


blessing

## 2018-07-23 NOTE — XR
EXAMINATION TYPE: XR chest 2V

 

DATE OF EXAM: 7/23/2018

 

COMPARISON: 7/20/2018

 

TECHNIQUE: PA and lateral views submitted.

 

HISTORY: Postthoracentesis

 

FINDINGS:

Bilateral consolidation small effusion greater on the left. No sizable pneumothorax. Underlying COPD.
 Postsurgical changes and cardiac device. Arthropathy of the shoulders.

 

IMPRESSION:

1. Stable pleural-parenchymal changes.

## 2018-07-24 VITALS — TEMPERATURE: 97 F | DIASTOLIC BLOOD PRESSURE: 62 MMHG | SYSTOLIC BLOOD PRESSURE: 116 MMHG

## 2018-07-24 VITALS — RESPIRATION RATE: 18 BRPM

## 2018-07-24 VITALS — HEART RATE: 84 BPM

## 2018-07-24 LAB
GLUCOSE BLD-MCNC: 136 MG/DL (ref 75–99)
GLUCOSE BLD-MCNC: 138 MG/DL (ref 75–99)
GLUCOSE BLD-MCNC: 98 MG/DL (ref 75–99)

## 2018-07-24 RX ADMIN — FUROSEMIDE SCH MG: 40 TABLET ORAL at 05:45

## 2018-07-24 RX ADMIN — IPRATROPIUM BROMIDE AND ALBUTEROL SULFATE PRN ML: .5; 3 SOLUTION RESPIRATORY (INHALATION) at 03:09

## 2018-07-24 RX ADMIN — GABAPENTIN SCH MG: 100 CAPSULE ORAL at 05:45

## 2018-07-24 RX ADMIN — INSULIN ASPART SCH: 100 INJECTION, SOLUTION INTRAVENOUS; SUBCUTANEOUS at 08:04

## 2018-07-24 RX ADMIN — INSULIN ASPART SCH UNIT: 100 INJECTION, SOLUTION INTRAVENOUS; SUBCUTANEOUS at 12:53

## 2018-07-24 RX ADMIN — GLIMEPIRIDE SCH MG: 1 TABLET ORAL at 08:04

## 2018-07-24 RX ADMIN — AMIODARONE HYDROCHLORIDE SCH MG: 200 TABLET ORAL at 08:04

## 2018-07-24 RX ADMIN — METOPROLOL SUCCINATE SCH MG: 50 TABLET, EXTENDED RELEASE ORAL at 08:04

## 2018-07-24 RX ADMIN — IPRATROPIUM BROMIDE AND ALBUTEROL SULFATE SCH ML: .5; 3 SOLUTION RESPIRATORY (INHALATION) at 07:12

## 2018-07-24 RX ADMIN — BUDESONIDE SCH MG: 1 SUSPENSION RESPIRATORY (INHALATION) at 07:12

## 2018-07-24 RX ADMIN — IPRATROPIUM BROMIDE AND ALBUTEROL SULFATE SCH ML: .5; 3 SOLUTION RESPIRATORY (INHALATION) at 11:12

## 2018-07-24 RX ADMIN — FORMOTEROL FUMARATE DIHYDRATE SCH MCG: 20 SOLUTION RESPIRATORY (INHALATION) at 07:12

## 2018-07-24 RX ADMIN — PANTOPRAZOLE SODIUM SCH MG: 40 TABLET, DELAYED RELEASE ORAL at 05:45

## 2018-07-24 RX ADMIN — IPRATROPIUM BROMIDE AND ALBUTEROL SULFATE SCH: .5; 3 SOLUTION RESPIRATORY (INHALATION) at 15:38

## 2018-07-24 RX ADMIN — DOCUSATE SODIUM SCH MG: 100 CAPSULE, LIQUID FILLED ORAL at 08:04

## 2018-07-24 RX ADMIN — AMOXICILLIN AND CLAVULANATE POTASSIUM SCH EACH: 875; 125 TABLET, FILM COATED ORAL at 08:04

## 2018-07-24 NOTE — CDI
Last Revision, December 2017

Date: 7/24/2018 11:30:00 AM

From: Lisa VILLA,RN,CCDS

Phone: [phone]

MRN: [id number]

Admit Date: 7/17/2018 6:01:00 AM

Patient Name: Erlin Boyle

Visit Number: [id number]



ATTENTION: The Clinical Documentation Specialists (CDI) and [facility] Coding Staff 
appreciate your assistance in clarifying documentation. Please respond to the 
clarification below the line at the bottom and electronically sign. The CDI & 
[facility] Coding staff will review the response and follow-up if needed. Please note: 
Queries are made part of the Legal Health Record. If you have any questions, 
please contact the author of this message via ITS.



Dr. Javan Vang



Healthcare acquired bilateral basilar pneumonia is documented in your progress 
notes.

Pulmonology notes Acute on chronic hypoxic respiratory failure secondary to 
bilateral 

healthcare acquired pneumonia with left pleural effusion, COPD exacerbation and 

CHF exacerbation   



History/Risk Factors: previous smoker admitted from rehab s/p NSTEMI and CABG 
June 2018, with acute on chronic  Systolic CHF with note of thoracentesis LT 
side performed 7/18 mostly transudative.  Afib, NSVT  Recent pneumonia with 
positive sputum cultures of Pseudomonas and oxacillin 

sensitiveStaphylococcus aureus from 06/11/2018 



Clinical Indicators: hemoptysis, respiratory failure, PN states on admission 
chest x-ray showed bibasilar pneumonia with large left pleural effusion.  

WBC on presentation of 17.7  sputum culture noted many nl resp brigette  

CT chest 7/17 consolidation rt lower lobe, subsegmental changes left lower lobe 
with endobronchial pacification, presumed reactive subcarnal and partracheal 
lymph nodes. Left pleural effusion layering posteriorly with mild lobulation

Lung/Breathing assessment diminished bs with rales and wheezing, 

Treatment: steroids, mucinex. Antibiotics Levaquin, Zosyn (dc 7/20) Augmentin 
PO 

O2 supplementation & Breathing Txs duoneb 

 

In order to capture the severity of condition, further clarification of the 
pneumonia being treated is requested, if known; please render you opinion below 
line.



Gram negative Pneumonia 

Pneumonia Due to MSSA

Pneumonia due to Other bacteria (please specify)______

Other, please specify _______

Unable to determine

___________________________________________________________________



Bibasilar health care acquired pneumonia of unspecified bacteria.  RD.











Please continue to document in your progress notes and discharge summary in 
order to capture severity of illness and risk of mortality. Include clinical 
findings that support your diagnosis.
MTDD

## 2018-07-24 NOTE — P.PN
Progress Note - Text





The patient is a 78-year-old gentleman who 2 days ago was transferred from 

[facility] after episodes of coughing up hemorrhagic sputum with increasing 

shortness of breath and decreasing O2 saturation values.  Patient's chest x-ray 

is consistent with bilateral lower lobe pneumonias and he has a left plural 

effusion.  Patient has been seen by thoracic surgery as he is post coronary 

artery bypass and also has been seen by a pulmonary medicine.  Overall patient 

states he feels better.  States he ate breakfast well this morning.  Denies any 

unusual chest discomfort.





Vital signs show a temperature of 97.2 with a pulse of 92 and respirations 18.  

Last blood pressure late yesterday was 96/73 and he was 94% saturated on 2 L.


Lungs do reveal diminished breath sounds on the left but otherwise are 

generally diminished but clear.


Heart tones are somewhat distant and irregular with history of atrial 

fibrillation.


No unusual distal edema.


He does appear somewhat anxious this morning but overall alert and oriented 

without any new focal deficits.





Laboratory


White count is 12.9 with a hemoglobin 11.2 and a platelet count of 232.


INR this morning is 1.3 as Coumadin has been on hold for possible thoracentesis.


Sodium is 137 with potassium 4.7.  CO2 content is 34.  BUN is 28 with a 

creatinine 0.8 given him a GFR of 86.  Blood sugar this morning was 165.


Magnesium is 2.5.  Albumin 3.3.





Chest x-ray


Per radiology there has been increase in the left pleural effusion.  Along with 

basilar pneumonic processes.





Impressions and plans


Patient overall appears to be doing well on present antibiotics which include 

Zosyn and levofloxacin.


He is also on IV corticosteroids.  Along with his maintenance pulmonary and 

Cardiologic medications.


We'll await further recommendations from thoracic surgery and pulmonary 

medicine.  Discussed with patient at bedside this morning.


He remains on subcu heparin and but his Coumadin is on hold.


Continue with his glimepiride for his blood sugar along with Accu-Cheks and 

coverage especially while he is on corticosteroids.
Progress Note - Text





The patient is a 78-year-old gentleman who 3 days previous was transferred from 

[facility] rehab after episode there of increasing shortness of breath associated 

with the hemorrhagic sputum production and decreasing O2 saturation.  The 

patient had a chest x-ray consistent with bilateral basilar pneumonia along 

with a left pleural effusion that was tapped yesterday by pulmonary medicine.  

He has also been maintained on IV antibiotics in the form of levofloxacin and 

Zosyn.  And he is also on IV corticosteroids.  This morning he is sitting up at 

the side of the bed in his chair.  States he feels better this morning.





Vital signs show a pulse of 86 with respirations of 18.  Temperature is 97.3 

and blood pressure 143/69.  He was 100% saturated on 3 L nasal cannula.


Breath sounds are somewhat better at the left base.  Otherwise they are 

diminished and clear.  Heart tones were somewhat distant and irregular.  No 

unusual distal edema or new neurological changes noted.





Laboratory


White count of 9 with a hemoglobin 10.4 and a platelet count of 179.


INR is 1.2 this morning


Sodium is 137 with a potassium 4.8 and a CO2 content of 33.  Blood sugar was 

170.  GFR is greater than 90.  Albumin 3.0.





Blood cultures from the 17th are so far all negative.


Cultures and stains from the pleural fluid is pending.





Chest x-ray from this morning is still consistent with a right lower lobe 

pneumonia.  No pneumothorax.  Left lower lobe area has improved.





Impressions and plans


Overall this generally frail 78-year-old gentleman who has had severe COPD and 

previous recent coronary artery bypass surgery 3 presented with basilar 

pneumonias and left pleural effusion.


He is presently improving on present treatment.


He is to be evaluated by physical and occupational therapy today.


We will await further recommendations from pulmonary medicine regarding 

discharge planning.  Discussed with patient possibility of either returning 

home or returning to rehab at [facility].  Discharge planning and social service 

on the case.
Progress Note - Text





The patient is a 78-year-old gentleman who 4 days previous was transferred from 

[facility] after an episode of shortness of breath associated with some 

hemorrhagic sputum at the time and decreasing O2 saturations.  Patient's chest x

-ray at that time was consistent with bilateral basilar pneumonia associated 

also with a left pleural effusion that has been tapped by pulmonary medicine.  

Patient has been maintained on IV antibiotics and corticosteroids.





The patient also subsequently developed a nonsustained ventricular tachycardia 

with apparently beats up to 30.  Patient has been seen by cardiology.  He has 

been placed on amiodarone.





This morning patient is a on the commode.  States he is feeling okay.  Denies 

any unusual shortness of breath or chest pain.





Vital signs reveal temperature of 97 with a pulse of 88 and respirations 17.  

Blood pressure 125/87 and he is 94% saturated on 3 L.


Lungs are generally clear but diminished diffusely.


Heart tones are irregularly irregular.  But rate presently controlled.


No unusual edema.


No focal neurological changes.





Laboratory


A white count of 10,000 with a hemoglobin of 10.4 and a platelet count of 195.


INR is 1.2.


Sodium 135 with a potassium of 4.1.  CO2 content of 34.  GFR is 86.


Blood sugar 155





Chest x-ray: Radiology report pending but appears to be stable from previous.





Impressions and plan


As above patient admitted and under treatment for bilateral basilar pneumonias.


He has had his left pleural fluid tapped.


He was found to have episodes of ventricular tachycardia is now on amiodarone.


Warfarin also was resumed last evening.





We'll await further recommendations from pulmonary medicine and cardiology 

regarding further discharge plans.
Progress Note - Text





The patient is a 78-year-old gentleman who 5 days previous was transferred from 

[facility] rehab after an episode of increasing shortness of breath associated 

with hemorrhagic sputum and at that time decreasing O2 saturations.  Patient 

last month had undergone coronary artery bypass surgery.  Chest x-ray was 

consistent with bilateral basilar pneumonias and he also had a left pleural 

effusion.  The left pleural effusion has been tapped.  The patient is on oral 

antibiotics at this time along with the 40 mg of prednisone orally.  He has 

also been restarted back on his Coumadin.  He has chronic atrial fibrillation.


This morning he is sitting up in his room.  States he slept well.  Denies any 

acute short shortness of breath or chest discomfort.  No nausea or vomiting.





Last recorded vitals reveal a temperature 97.3 with a pulse of 80 and 

respirations 17.  Blood pressure 106/64 and he is 94% saturated on 3 L.


Lungs are generally clear although diminished.


Heart tones are irregularly irregular but rate is overall controlled.


No unusual edema.


He is alert and oriented without focal weakness noted.





Laboratory


White count 8.3 with a hemoglobin of 10.4 and a platelet count of 191.


INR this morning is 1.4


Basic metabolic panel was stable.  Blood sugar 100.





Impressions and plans


Overall this gentleman appears to be recovering well.  He is post thoracentesis 

of the left pleural effusion.


Pulmonary status appears to be improving on present antibiotics.


Pulmonary medicine notes regarded.


He is back on Coumadin and INRs to be monitored.


Antic[facility].


Discussed with patient and nursing staff this morning.  If bed is needed on 

selective care from my standpoint patient can be moved if okay with cardiology 

to a general medical floor with telemetry.





Dr. Juan Cook on-call for me over the weekend if any concerns or problems 

should arise.
Progress Note - Text





The patient is a 78-year-old gentleman who was initially transferred from 

[facility] with increasing shortness of breath associated with 

hemoptysis and decreasing O2 saturations.  He also had a previous recent 

coronary artery bypass surgery.  On admission chest x-ray showed bibasilar 

pneumonia is with a large left pleural effusion.


Patient had the effusion tapped it has been placed on antibiotics and has been 

gradually improving.


Likely anticipating transfer back to [facility].





At this morning she is sitting in a chair at the side of the bed.


Vital signs reveal temperature of 97 with a pulse of 82 and respirations 16.  

Blood pressure 116/62 and he is 97% saturated on 2 L.


Lungs are generally clear but diffusely diminished.


Heart tones are somewhat irregular with history of atrial flutter.


No unusual distal edema.


No focal neurological changes.


Blood sugar was 98 this morning.  INR from yesterday evening was 2.7.





Chest x-ray from yesterday was stable.





Impressions and plans


Anticipating a discharge back to [facility].  Please refer to 

discharge summary that was dictated on July 22 for orders and medications.


Medications and discharge diagnoses listed there.


Prognosis is still overall guarded in light of age and numerous medical 

concerns.
Subjective


Progress Note Date: 07/18/18


Principal diagnosis: 





Acute on chronic hypoxic respiratory failure secondary to bilateral healthcare 

acquired pneumonia with left pleural effusion, COPD exacerbation, systolic 

congestive heart failure exacerbation.





This is a very pleasant 78-year-old gentleman who follows with Dr. Vang as 

his primary care physician.  He has a history of hypertension, osteomyelitis, 

peripheral vascular occlusive disease, diabetic neuropathy, diabetes mellitus 

type 2, chronic atrial fibrillation, chronic hypoxic respiratory failure with 

severe COPD, Gold stage III with an FEV1 value of 45% of predicted.  He follows 

in our office for the same.  He has a history of pseudomonas aeruginosa and 

oxacillin sensitive Staphylococcus aureus positive sputum cultures in June 2018.  He also has history of coronary artery disease and recently sustained a 

non-ST segment myocardial infarction requiring subsequent coronary artery 

bypass grafting utilizing a LIMA to the LAD, reverse saphenous vein graft to 

the OM1, OM 2, RCA.  He had been seen by Dr. Del Cid last week and follow-up in 

the office.  He was found to have a left pleural effusion measuring 10 cm with 

shortness of breath.  He was asked to hold his warfarin and the plan was for a 

left thoracentesis to be done in the outpatient setting today.  However, he 

presented to the emergency room approximately at 2:00 this morning from the 

[facility] with worsening shortness of breath and coughing up pink 

frothy sputum.  CT angiogram ruled out pulmonary embolism.  There was a noted 

consolidation involving the right lower lobe with subsegmental changes in the 

left lower lobe and endobronchial opacification suspicious for pneumonia.  

There is a left pleural effusion layering posteriorly with mild loculation.  He 

was seen and evaluated in the emergency room this morning with Dr. Del Cid.  He 

is awake and alert in mild respiratory distress.  He initially required 15L 

nonrebreather mask.  He is currently on 5 L/m per nasal cannula to maintain O2 

saturations in the 90s.  Afebrile.  He slightly tachycardic.  Atrial 

fibrillation.  White count 17.7.  Hemoglobin 13.3.  INR 1.3.  Creatinine 0.70.  

ProBNP 2480.  He has been initiated on Zosyn and Levaquin.





The patient is seen again today 07/18/2018 in follow-up on the selective care 

unit.  He is currently sitting up in a chair at the bedside.  He is awake and 

alert in no acute distress.  He is breathing quite a bit better today as 

compared to yesterday.  He continues with a loose nonproductive cough.  He did 

undergo a left-sided thoracentesis this morning by interventional radiology in 

approximately 1.06 L of serosanguineous fluid was removed.  All of chest x-ray 

revealed no evidence complications from the thoracentesis.  There is a right 

lower lobe atelectasis.  He is working well with the incentive spirometer.  

Maintaining O2 saturations in the 90s on 3 L/m per nasal cannula.  

Hemodynamically stable.  Sputum culture pending.  Blood culture reveals no 

growth to date.  White count 12.9.  Hemoglobin 11.2.  INR 1.3.  Creatinine 

0.80. 





Objective





- Vital Signs


Vital signs: 


 Vital Signs











Temp  96.2 F L  07/17/18 20:30


 


Pulse  106 H  07/18/18 11:07


 


Resp  18   07/18/18 11:07


 


BP  129/68   07/18/18 11:07


 


Pulse Ox  92 L  07/18/18 11:07








 Intake & Output











 07/17/18 07/18/18 07/18/18





 18:59 06:59 18:59


 


Intake Total   236


 


Output Total  200 


 


Balance  -200 236


 


Weight  71.2 kg 


 


Intake:   


 


  Oral   236


 


Output:   


 


  Urine  200 


 


Other:   


 


  Voiding Method  Toilet 


 


  # Voids  3 














- Exam





- Constitutional


General appearance: cooperative, no acute distress





- EENT


Eyes: EOMI, PERRLA


ENT: hearing grossly normal


Ears: bilateral: normal





- Neck


Carotids: bilateral: upstroke normal


Thyroid: bilateral: normal size





- Respiratory


Respiratory: bilateral: diminished, rales, wheezing





- Cardiovascular


Rhythm: irregularly irregular


Heart sounds: normal: S1, S2





- Gastrointestinal


General gastrointestinal: normal bowel sounds





- Integumentary


Integumentary: decreased turgor





- Neurologic


Neurologic: CNII-XII intact





- Musculoskeletal


Musculoskeletal: generalized weakness





- Psychiatric


Psychiatric: A&O x's 3, appropriate affect, intact judgment & insight





- Labs


CBC & Chem 7: 


 07/18/18 05:47





 07/18/18 05:47


Labs: 


 Abnormal Lab Results - Last 24 Hours (Table)











  07/17/18 07/17/18 07/17/18 Range/Units





  12:16 18:03 20:38 


 


WBC     (3.8-10.6)  k/uL


 


RBC     (4.30-5.90)  m/uL


 


Hgb     (13.0-17.5)  gm/dL


 


Hct     (39.0-53.0)  %


 


MCHC     (31.0-37.0)  g/dL


 


RDW     (11.5-15.5)  %


 


Neutrophils #     (1.3-7.7)  k/uL


 


Lymphocytes #     (1.0-4.8)  k/uL


 


PT     (9.0-12.0)  sec


 


INR     (<1.2)  


 


Chloride     ()  mmol/L


 


Carbon Dioxide     (22-30)  mmol/L


 


BUN     (9-20)  mg/dL


 


Glucose     (74-99)  mg/dL


 


POC Glucose (mg/dL)  195 H  168 H  237 H  (75-99)  mg/dL


 


Magnesium     (1.6-2.3)  mg/dL


 


Total Protein     (6.3-8.2)  g/dL


 


Albumin     (3.5-5.0)  g/dL














  07/18/18 07/18/18 07/18/18 Range/Units





  05:47 05:47 05:47 


 


WBC  12.9 H    (3.8-10.6)  k/uL


 


RBC  4.12 L    (4.30-5.90)  m/uL


 


Hgb  11.2 L    (13.0-17.5)  gm/dL


 


Hct  36.3 L    (39.0-53.0)  %


 


MCHC  30.7 L    (31.0-37.0)  g/dL


 


RDW  16.4 H    (11.5-15.5)  %


 


Neutrophils #  11.8 H    (1.3-7.7)  k/uL


 


Lymphocytes #  0.6 L    (1.0-4.8)  k/uL


 


PT   12.3 H   (9.0-12.0)  sec


 


INR   1.3 H   (<1.2)  


 


Chloride    94 L  ()  mmol/L


 


Carbon Dioxide    34 H  (22-30)  mmol/L


 


BUN    28 H  (9-20)  mg/dL


 


Glucose    165 H  (74-99)  mg/dL


 


POC Glucose (mg/dL)     (75-99)  mg/dL


 


Magnesium    2.5 H  (1.6-2.3)  mg/dL


 


Total Protein    5.7 L  (6.3-8.2)  g/dL


 


Albumin    3.3 L  (3.5-5.0)  g/dL














  07/18/18 07/18/18 Range/Units





  06:09 11:22 


 


WBC    (3.8-10.6)  k/uL


 


RBC    (4.30-5.90)  m/uL


 


Hgb    (13.0-17.5)  gm/dL


 


Hct    (39.0-53.0)  %


 


MCHC    (31.0-37.0)  g/dL


 


RDW    (11.5-15.5)  %


 


Neutrophils #    (1.3-7.7)  k/uL


 


Lymphocytes #    (1.0-4.8)  k/uL


 


PT    (9.0-12.0)  sec


 


INR    (<1.2)  


 


Chloride    ()  mmol/L


 


Carbon Dioxide    (22-30)  mmol/L


 


BUN    (9-20)  mg/dL


 


Glucose    (74-99)  mg/dL


 


POC Glucose (mg/dL)  177 H  246 H  (75-99)  mg/dL


 


Magnesium    (1.6-2.3)  mg/dL


 


Total Protein    (6.3-8.2)  g/dL


 


Albumin    (3.5-5.0)  g/dL








 Microbiology - Last 24 Hours (Table)











 07/17/18 08:38 Blood Culture - Preliminary





 Blood    No Growth after 24 hours


 


 07/17/18 08:42 Blood Culture - Preliminary





 Blood    No Growth after 24 hours


 


 07/17/18 02:49 Blood Culture - Preliminary





 Blood    No Growth after 24 hours


 


 07/17/18 20:54 Gram Stain - Preliminary





 Sputum Sputum Culture - Preliminary














Assessment and Plan


Assessment: 





Impression:





#1 Acute on chronic hypoxic respiratory failure secondary to bilateral 

healthcare acquired pneumonia with left pleural effusion, COPD exacerbation and 

CHF exacerbation





#2 Left pleural effusion, warfarin on hold for plans for an outpatient 

thoracentesis today prior to his arrival to the emergency room.  Status post 

left thoracentesis today by interventional radiology.  Approximately 1 L of 

serosanguinous removed.





#3 Acute exacerbation of oxygen dependent chronic obstructive pulmonary disease

, Gold stage III with FEV1 value of 45% of predicted.





#4 Recent pneumonia with positive sputum cultures of Pseudomonas and oxacillin 

sensitive Staphylococcus aureus from 06/11/2018.





#5 Acute exacerbation of chronic systolic congestive heart failure.  Mild to 

moderately impaired left ventricular systolic function with ejection fraction 40

-45%.





#6 Atrial fibrillation with varying ventricular response.  Warfarin currently 

on hold.  INR 1.3.





#7 Recent non-ST segment elevation myocardial infarction and subsequent 

coronary artery bypass grafting on 06/11/2018.  LIMA to the LAD, reverse this 

saphenous vein grafts to the first obtuse marginal artery, second obtuse 

marginal artery, right coronary artery.





#8 Diabetes mellitus, type II.





#9 Diabetic neuropathy.





#10 Peripheral vascular occlusive disease.  Multiple surgeries including 

palpitations of the fourth toe bilaterally.





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 

right second finger.





#12 Hypertension, history of.





#13 History of peptic ulcer disease with partial gastrectomy and colectomy. 





#14 Poor overall functional performance based on the above-mentioned multiple 

comorbidities.





Plan:





The patient was seen and evaluated by Dr. Del Cid.  The patient is improved 

today as compared to yesterday.  Status post left-sided thoracentesis with 1 L 

of fluid removed today.  Continue antibiotics in the form of Zosyn and 

Levaquin.  DuoNeb inhalations every 4 hours and when necessary, Pulmicort and 

Perforomist inhalations twice a day, IV Solu-Medrol 60 mg every 6 hours.  

Sputum and blood cultures pending.  Continue diuretics.  We'll continue to 

follow and make further recommendations based on his clinical status.





I, the cosigning physician, performed a history & physical examination of the 

patient. Lungs sounds with crackles in the posterior bases, diminished.  

Maintaining good O2 saturations in the 90s on 3 L/m per nasal cannula.  I 

discussed the assessment and plan of care with my nurse practitioner, Li Wong. I attest to the above note as dictated by her.
Subjective


Progress Note Date: 07/18/18


Principal diagnosis: 





Admission for right lower lobe pneumonia.  Current left-sided pleural effusion.

  Previous medical history of triple-vessel coronary artery disease with left 

main disease and non-ST elevation myocardial infarction status post urgent 

quadruple coronary artery bypass grafting as well as exclusion of the left 

atrial appendage on 06/11/2018, mild to moderate left ventricular dysfunction, 

chronic systolic heart failure with ejection fraction of 40-45% as well as 

diastolic dysfunction, chronic persistent atrial fibrillation on home Coumadin 

therapy, sick sinus syndrome status post pacemaker insertion, hypertension, 

hyperlipidemia, carotid stenosis 50-79% bilaterally, previous tobacco dependence

, severe COPD with FEV1 45% of predicted, arthritis, BPH, GERD, recurrent 

extremity osteomyelitis with MRSA requiring toe and finger amputations, type 2 

diabetes mellitus with previous hemoglobin A1c 5.9%, peripheral vascular disease

, pneumonia, family history of early onset coronary artery disease, and 

previous sputum culture positive for pseudomonas and MSSA.





Patient's currently sitting up in chair in no acute distress.  States his 

shortness of breath has improved significantly.  He has no new complaints at 

this time.





Objective





- Vital Signs


Vital signs: 


 Vital Signs











Temp  96.2 F L  07/17/18 20:30


 


Pulse  102 H  07/18/18 08:52


 


Resp  20   07/18/18 09:14


 


BP  115/75   07/18/18 09:14


 


Pulse Ox  94 L  07/18/18 09:14








 Intake & Output











 07/17/18 07/18/18 07/18/18





 18:59 06:59 18:59


 


Intake Total   236


 


Output Total  200 


 


Balance  -200 236


 


Weight  71.2 kg 


 


Intake:   


 


  Oral   236


 


Output:   


 


  Urine  200 


 


Other:   


 


  Voiding Method  Toilet 


 


  # Voids  3 














- Constitutional


General appearance: Present: cooperative, no acute distress





- Respiratory


Details: 





Lungs sounds diminished bilaterally with coarse breath sounds in the bases.  

Respirations even, nonlabored.  Currently on 3 L nasal cannula with oxygen 

saturation 94%.  Able to achieve 1000 mL on his incentive spirometry.  

Effective productive cough.





- Cardiovascular


Details: 





S1, S2 present.  Irregular rate and rhythm, atrial fibrillation on telemetry.  

Sternum stable.  Palpable peripheral pulses bilaterally.  No edema present.  No 

calf pain or tenderness noted.  SCDs present.





- Gastrointestinal


Gastrointestinal Comment(s): 





Abdomen soft, nontender, nondistended.  Active bowel sounds 4 quadrants.  

Tolerating diet.





- Genitourinary


Genitourinary Comment(s): 





Continues to void clear, yellow urine.





- Integumentary


Integumentary Comment(s): 





Skin is warm and dry.  Anterior chest incision well healed.  Right lower 

extremity EVH site open with small amount of drainage.





- Neurologic


Neurologic: Present: CNII-XII intact





- Musculoskeletal


Musculoskeletal: Present: strength equal bilaterally





- Psychiatric


Psychiatric: Present: A&O x's 3, appropriate affect, intact judgment & insight





- Allied health notes


Allied health notes reviewed: nursing





- Labs


CBC & Chem 7: 


 07/18/18 05:47





 07/18/18 05:47


Labs: 


 Abnormal Lab Results - Last 24 Hours (Table)











  07/17/18 07/17/18 07/17/18 Range/Units





  12:16 18:03 20:38 


 


WBC     (3.8-10.6)  k/uL


 


RBC     (4.30-5.90)  m/uL


 


Hgb     (13.0-17.5)  gm/dL


 


Hct     (39.0-53.0)  %


 


MCHC     (31.0-37.0)  g/dL


 


RDW     (11.5-15.5)  %


 


Neutrophils #     (1.3-7.7)  k/uL


 


Lymphocytes #     (1.0-4.8)  k/uL


 


PT     (9.0-12.0)  sec


 


INR     (<1.2)  


 


Chloride     ()  mmol/L


 


Carbon Dioxide     (22-30)  mmol/L


 


BUN     (9-20)  mg/dL


 


Glucose     (74-99)  mg/dL


 


POC Glucose (mg/dL)  195 H  168 H  237 H  (75-99)  mg/dL


 


Magnesium     (1.6-2.3)  mg/dL


 


Total Protein     (6.3-8.2)  g/dL


 


Albumin     (3.5-5.0)  g/dL














  07/18/18 07/18/18 07/18/18 Range/Units





  05:47 05:47 05:47 


 


WBC  12.9 H    (3.8-10.6)  k/uL


 


RBC  4.12 L    (4.30-5.90)  m/uL


 


Hgb  11.2 L    (13.0-17.5)  gm/dL


 


Hct  36.3 L    (39.0-53.0)  %


 


MCHC  30.7 L    (31.0-37.0)  g/dL


 


RDW  16.4 H    (11.5-15.5)  %


 


Neutrophils #  11.8 H    (1.3-7.7)  k/uL


 


Lymphocytes #  0.6 L    (1.0-4.8)  k/uL


 


PT   12.3 H   (9.0-12.0)  sec


 


INR   1.3 H   (<1.2)  


 


Chloride    94 L  ()  mmol/L


 


Carbon Dioxide    34 H  (22-30)  mmol/L


 


BUN    28 H  (9-20)  mg/dL


 


Glucose    165 H  (74-99)  mg/dL


 


POC Glucose (mg/dL)     (75-99)  mg/dL


 


Magnesium    2.5 H  (1.6-2.3)  mg/dL


 


Total Protein    5.7 L  (6.3-8.2)  g/dL


 


Albumin    3.3 L  (3.5-5.0)  g/dL














  07/18/18 Range/Units





  06:09 


 


WBC   (3.8-10.6)  k/uL


 


RBC   (4.30-5.90)  m/uL


 


Hgb   (13.0-17.5)  gm/dL


 


Hct   (39.0-53.0)  %


 


MCHC   (31.0-37.0)  g/dL


 


RDW   (11.5-15.5)  %


 


Neutrophils #   (1.3-7.7)  k/uL


 


Lymphocytes #   (1.0-4.8)  k/uL


 


PT   (9.0-12.0)  sec


 


INR   (<1.2)  


 


Chloride   ()  mmol/L


 


Carbon Dioxide   (22-30)  mmol/L


 


BUN   (9-20)  mg/dL


 


Glucose   (74-99)  mg/dL


 


POC Glucose (mg/dL)  177 H  (75-99)  mg/dL


 


Magnesium   (1.6-2.3)  mg/dL


 


Total Protein   (6.3-8.2)  g/dL


 


Albumin   (3.5-5.0)  g/dL








 Microbiology - Last 24 Hours (Table)











 07/17/18 02:49 Blood Culture - Preliminary





 Blood    No Growth after 24 hours


 


 07/17/18 20:54 Gram Stain - Preliminary





 Sputum Sputum Culture - Preliminary














- Imaging and Cardiology


Chest x-ray: report reviewed, image reviewed





Assessment and Plan


(1) Chronic systolic heart failure


Current Visit: Yes   Status: Chronic   Code(s): I50.22 - CHRONIC SYSTOLIC (

CONGESTIVE) HEART FAILURE   SNOMED Code(s): 369989552


   





(2) Pleural effusion


Current Visit: Yes   Status: Acute   Code(s): J90 - PLEURAL EFFUSION, NOT 

ELSEWHERE CLASSIFIED   SNOMED Code(s): 41520407


   





(3) Pneumonia


Current Visit: Yes   Status: Acute   Code(s): J18.9 - PNEUMONIA, UNSPECIFIED 

ORGANISM   SNOMED Code(s): 324269085


   





(4) Non-ST elevated myocardial infarction


Current Visit: No   Status: Resolved   Code(s): I21.4 - NON-ST ELEVATION (NSTEMI

) MYOCARDIAL INFARCTION   SNOMED Code(s): 092837630


   





(5) BPH (benign prostatic hyperplasia)


Current Visit: Yes   Status: Chronic   Code(s): N40.0 - BENIGN PROSTATIC 

HYPERPLASIA WITHOUT LOWER URINRY TRACT SYMP   SNOMED Code(s): 282388800


   





(6) CAD (coronary artery disease)


Current Visit: Yes   Status: Chronic   Code(s): I25.10 - ATHSCL HEART DISEASE 

OF NATIVE CORONARY ARTERY W/O ANG PCTRS   SNOMED Code(s): 67096352


   





(7) COPD (chronic obstructive pulmonary disease)


Current Visit: Yes   Status: Chronic   Code(s): J44.9 - CHRONIC OBSTRUCTIVE 

PULMONARY DISEASE, UNSPECIFIED   SNOMED Code(s): 76305435


   





(8) Chronic atrial fibrillation


Current Visit: Yes   Status: Chronic   Code(s): I48.2 - CHRONIC ATRIAL 

FIBRILLATION   SNOMED Code(s): 516712938


   





(9) Diabetes mellitus type 2 in nonobese


Current Visit: Yes   Status: Chronic   Code(s): E11.9 - TYPE 2 DIABETES 

MELLITUS WITHOUT COMPLICATIONS   SNOMED Code(s): 390888861


   





(10) Family history of early CAD


Current Visit: Yes   Status: Chronic   Code(s): Z82.49 - FAMILY HX OF ISCHEM 

HEART DIS AND OTH DIS OF THE CIRC SYS   SNOMED Code(s): 528839022


   





(11) GERD (gastroesophageal reflux disease)


Current Visit: Yes   Status: Chronic   Code(s): K21.9 - GASTRO-ESOPHAGEAL 

REFLUX DISEASE WITHOUT ESOPHAGITIS   SNOMED Code(s): 048037931


   





(12) History of Coumadin therapy


Current Visit: Yes   Status: Chronic   Code(s): Z92.29 - PERSONAL HISTORY OF 

OTHER DRUG THERAPY   SNOMED Code(s): 266188376


   





(13) History of permanent cardiac pacemaker placement


Current Visit: Yes   Status: Chronic   Code(s): Z95.0 - PRESENCE OF CARDIAC 

PACEMAKER   SNOMED Code(s): 741631446


   





(14) History of sick sinus syndrome


Current Visit: Yes   Status: Chronic   Code(s): Z86.79 - PERSONAL HISTORY OF 

OTHER DISEASES OF THE CIRCULATORY SYSTEM   SNOMED Code(s): 965461973454597


   





(15) Hypertension


Current Visit: Yes   Status: Chronic   Code(s): I10 - ESSENTIAL (PRIMARY) 

HYPERTENSION   SNOMED Code(s): 42195252


   





(16) On home oxygen therapy


Current Visit: Yes   Status: Chronic   Code(s): Z99.81 - DEPENDENCE ON 

SUPPLEMENTAL OXYGEN   SNOMED Code(s): 249177288999


   





(17) Peripheral vascular disease


Current Visit: Yes   Status: Chronic   Code(s): I73.9 - PERIPHERAL VASCULAR 

DISEASE, UNSPECIFIED   SNOMED Code(s): 971349959


   





(18) History of MRSA infection


Current Visit: No   Status: Resolved   Code(s): Z86.14 - PERSONAL HISTORY OF 

METHICILLIN RESIS STAPH INFECTION   SNOMED Code(s): 332605962


   





(19) History of smoking


Current Visit: No   Status: Resolved   Code(s): Z87.891 - PERSONAL HISTORY OF 

NICOTINE DEPENDENCE   SNOMED Code(s): 41579449960586984


   





(20) History of coronary artery bypass graft


Current Visit: Yes   Status: Chronic   Code(s): Z95.1 - PRESENCE OF 

AORTOCORONARY BYPASS GRAFT   SNOMED Code(s): 594315174


   


Plan: 





1.  Continue aspirin, statin, beta blocker.  Will increase beta blocker therapy 

as tolerated.


2.  Hold Coumadin for now in anticipation of thoracentesis by interventional 

radiology.  Heparin subcu added in for DVT prophylaxis.


3.  Wean O2 as tolerated.  Encourage incentive spirometry use 10 times every 

hour.


4.  Bronchodilators, steroids per Dr. Del Cid.


5.  Continue antibiotics. 


6.  Will monitor daily labs and x-rays.


7.  Increase activity, ambulate as tolerated.  PT/OT following.


8.  Protonix for GI prophylaxis.


9.  Insulin, other medical comorbidities per Dr. Griffiths.


10.  If the patient is able to have thoracentesis performed today, from our 

standpoint he is stable to return to extended care/home on oral steroids and 

antibiotics.  Patient appears to be back to baseline and can be managed 

outpatient.








Time with Patient: Greater than 30
Subjective


Progress Note Date: 07/19/18


Principal diagnosis: 





Acute on chronic hypoxic respiratory failure secondary to bilateral healthcare 

acquired pneumonia with left pleural effusion, COPD exacerbation, systolic 

congestive heart failure exacerbation.





This is a very pleasant 78-year-old gentleman who follows with Dr. Vang as 

his primary care physician.  He has a history of hypertension, osteomyelitis, 

peripheral vascular occlusive disease, diabetic neuropathy, diabetes mellitus 

type 2, chronic atrial fibrillation, chronic hypoxic respiratory failure with 

severe COPD, Gold stage III with an FEV1 value of 45% of predicted.  He follows 

in our office for the same.  He has a history of pseudomonas aeruginosa and 

oxacillin sensitive Staphylococcus aureus positive sputum cultures in June 2018.  He also has history of coronary artery disease and recently sustained a 

non-ST segment myocardial infarction requiring subsequent coronary artery 

bypass grafting utilizing a LIMA to the LAD, reverse saphenous vein graft to 

the OM1, OM 2, RCA.  He had been seen by Dr. Del Cid last week and follow-up in 

the office.  He was found to have a left pleural effusion measuring 10 cm with 

shortness of breath.  He was asked to hold his warfarin and the plan was for a 

left thoracentesis to be done in the outpatient setting today.  However, he 

presented to the emergency room approximately at 2:00 this morning from the 

[facility] with worsening shortness of breath and coughing up pink 

frothy sputum.  CT angiogram ruled out pulmonary embolism.  There was a noted 

consolidation involving the right lower lobe with subsegmental changes in the 

left lower lobe and endobronchial opacification suspicious for pneumonia.  

There is a left pleural effusion layering posteriorly with mild loculation.  He 

was seen and evaluated in the emergency room this morning with Dr. Del Cid.  He 

is awake and alert in mild respiratory distress.  He initially required 15L 

nonrebreather mask.  He is currently on 5 L/m per nasal cannula to maintain O2 

saturations in the 90s.  Afebrile.  He slightly tachycardic.  Atrial 

fibrillation.  White count 17.7.  Hemoglobin 13.3.  INR 1.3.  Creatinine 0.70.  

ProBNP 2480.  He has been initiated on Zosyn and Levaquin.





The patient is seen again today 07/18/2018 in follow-up on the selective care 

unit.  He is currently sitting up in a chair at the bedside.  He is awake and 

alert in no acute distress.  He is breathing quite a bit better today as 

compared to yesterday.  He continues with a loose nonproductive cough.  He did 

undergo a left-sided thoracentesis this morning by interventional radiology in 

approximately 1.06 L of serosanguineous fluid was removed.  All of chest x-ray 

revealed no evidence complications from the thoracentesis.  There is a right 

lower lobe atelectasis.  He is working well with the incentive spirometer.  

Maintaining O2 saturations in the 90s on 3 L/m per nasal cannula.  

Hemodynamically stable.  Sputum culture pending.  Blood culture reveals no 

growth to date.  White count 12.9.  Hemoglobin 11.2.  INR 1.3.  Creatinine 

0.80. 





The patient is seen again today 07/19/2018 in follow-up on the selective care 

unit.  He is awake and alert in no acute distress.  He is breathing quite a bit 

easier today as compared to yesterday.  Nearly back to his baseline.  He 

sitting up in a chair at the bedside.  He denies any worsening shortness of 

breath, cough or congestion.  Today's chest x-ray shows near complete 

resolution of the pleural effusion status post thoracentesis on the left.  

There is some minimal blunting of left costophrenic angle.  Maintaining good O2 

saturations in the mid 90s on 3 L/m per nasal cannula.  He is afebrile.  White 

count 9.0.  Hemoglobin 10.0.  Creatinine 0.69.  He did have runs of 

nonsustained ventricular tachycardia requiring the initiation of amiodarone.





Objective





- Vital Signs


Vital signs: 


 Vital Signs











Temp  97.0 F L  07/19/18 08:00


 


Pulse  86   07/19/18 08:03


 


Resp  18   07/19/18 04:00


 


BP  105/60   07/19/18 08:00


 


Pulse Ox  94 L  07/19/18 08:00








 Intake & Output











 07/18/18 07/19/18 07/19/18





 18:59 06:59 18:59


 


Intake Total 708  240


 


Output Total 0 850 


 


Balance 708 -850 240


 


Weight  71.3 kg 


 


Intake:   


 


  Oral 708  240


 


Output:   


 


  Urine 0 850 


 


  Stool 0  


 


Other:   


 


  Voiding Method Toilet Toilet 





 Urinal  


 


  # Voids 0 1 1


 


  # Bowel Movements 0  














- Exam





- Constitutional


General appearance: cooperative, no acute distress





- EENT


Eyes: EOMI, PERRLA


ENT: hearing grossly normal


Ears: bilateral: normal





- Neck


Carotids: bilateral: upstroke normal


Thyroid: bilateral: normal size





- Respiratory


Respiratory: bilateral: diminished, rales





- Cardiovascular


Rhythm: irregularly irregular


Heart sounds: normal: S1, S2





- Gastrointestinal


General gastrointestinal: normal bowel sounds





- Integumentary


Integumentary: decreased turgor





- Neurologic


Neurologic: CNII-XII intact





- Musculoskeletal


Musculoskeletal: generalized weakness





- Psychiatric


Psychiatric: A&O x's 3, appropriate affect, intact judgment & insight





- Labs


CBC & Chem 7: 


 07/19/18 05:55





 07/19/18 05:55


Labs: 


 Abnormal Lab Results - Last 24 Hours (Table)











  07/18/18 07/18/18 07/18/18 Range/Units





  11:22 16:43 21:18 


 


RBC     (4.30-5.90)  m/uL


 


Hgb     (13.0-17.5)  gm/dL


 


Hct     (39.0-53.0)  %


 


MCHC     (31.0-37.0)  g/dL


 


RDW     (11.5-15.5)  %


 


Neutrophils #     (1.3-7.7)  k/uL


 


Lymphocytes #     (1.0-4.8)  k/uL


 


INR     (<1.2)  


 


Chloride     ()  mmol/L


 


Carbon Dioxide     (22-30)  mmol/L


 


BUN     (9-20)  mg/dL


 


Glucose     (74-99)  mg/dL


 


POC Glucose (mg/dL)  246 H  179 H  222 H  (75-99)  mg/dL


 


Total Protein     (6.3-8.2)  g/dL


 


Albumin     (3.5-5.0)  g/dL














  07/19/18 07/19/18 07/19/18 Range/Units





  05:55 05:55 05:55 


 


RBC  3.90 L    (4.30-5.90)  m/uL


 


Hgb  10.4 L    (13.0-17.5)  gm/dL


 


Hct  33.9 L    (39.0-53.0)  %


 


MCHC  30.7 L    (31.0-37.0)  g/dL


 


RDW  16.0 H    (11.5-15.5)  %


 


Neutrophils #  8.2 H    (1.3-7.7)  k/uL


 


Lymphocytes #  0.3 L    (1.0-4.8)  k/uL


 


INR   1.2 H   (<1.2)  


 


Chloride    97 L  ()  mmol/L


 


Carbon Dioxide    33 H  (22-30)  mmol/L


 


BUN    30 H  (9-20)  mg/dL


 


Glucose    170 H  (74-99)  mg/dL


 


POC Glucose (mg/dL)     (75-99)  mg/dL


 


Total Protein    5.2 L  (6.3-8.2)  g/dL


 


Albumin    3.0 L  (3.5-5.0)  g/dL














  07/19/18 Range/Units





  06:47 


 


RBC   (4.30-5.90)  m/uL


 


Hgb   (13.0-17.5)  gm/dL


 


Hct   (39.0-53.0)  %


 


MCHC   (31.0-37.0)  g/dL


 


RDW   (11.5-15.5)  %


 


Neutrophils #   (1.3-7.7)  k/uL


 


Lymphocytes #   (1.0-4.8)  k/uL


 


INR   (<1.2)  


 


Chloride   ()  mmol/L


 


Carbon Dioxide   (22-30)  mmol/L


 


BUN   (9-20)  mg/dL


 


Glucose   (74-99)  mg/dL


 


POC Glucose (mg/dL)  177 H  (75-99)  mg/dL


 


Total Protein   (6.3-8.2)  g/dL


 


Albumin   (3.5-5.0)  g/dL








 Microbiology - Last 24 Hours (Table)











 07/18/18 10:35 Gram Stain - Preliminary





 Pleural Fluid Body Fluid Culture - Preliminary


 


 07/17/18 02:49 Blood Culture - Preliminary





 Blood    No Growth after 48 hours


 


 07/18/18 10:35 Anaerobic Culture - Preliminary





 Pleural Fluid 


 


 07/17/18 08:38 Blood Culture - Preliminary





 Blood    No Growth after 24 hours


 


 07/17/18 08:42 Blood Culture - Preliminary





 Blood    No Growth after 24 hours














Assessment and Plan


Assessment: 





Impression:





#1 Acute on chronic hypoxic respiratory failure secondary to bilateral 

healthcare acquired pneumonia with left pleural effusion, COPD exacerbation and 

CHF exacerbation





#2 Left pleural effusion, warfarin on hold for plans for an outpatient 

thoracentesis today prior to his arrival to the emergency room.  Status post 

left thoracentesis today by interventional radiology.  Approximately 1 L of 

serosanguinous removed.





#3 Acute exacerbation of oxygen dependent chronic obstructive pulmonary disease

, Gold stage III with FEV1 value of 45% of predicted.





#4 Recent pneumonia with positive sputum cultures of Pseudomonas and oxacillin 

sensitive Staphylococcus aureus from 06/11/2018.





#5 Acute exacerbation of chronic systolic congestive heart failure.  Mild to 

moderately impaired left ventricular systolic function with ejection fraction 40

-45%.





#6 Atrial fibrillation with varying ventricular response.  Warfarin resumed.





#7 Recent non-ST segment elevation myocardial infarction and subsequent 

coronary artery bypass grafting on 06/11/2018.  LIMA to the LAD, reverse this 

saphenous vein grafts to the first obtuse marginal artery, second obtuse 

marginal artery, right coronary artery.





#8 Diabetes mellitus, type II.





#9 Diabetic neuropathy.





#10 Peripheral vascular occlusive disease.  Multiple surgeries including 

palpitations of the fourth toe bilaterally.





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 

right second finger.





#12 Hypertension, history of.





#13 History of peptic ulcer disease with partial gastrectomy and colectomy. 





#14 ventricular tachycardia, initiated on amiodarone





#15 Poor overall functional performance based on the above-mentioned multiple 

comorbidities.





Plan:





The patient was seen and evaluated by Dr. Del Cid.  The patient is improved 

today as compared to yesterday.  Status post left-sided thoracentesis with 1.6 

L of fluid removed.  Preliminary cultures reveal no growth.  Continue 

antibiotics.  DuoNeb inhalations every 4 hours and when necessary, Pulmicort 

and Perforomist inhalations twice a day, convert to oral prednisone.  Resume 

warfarin.  Continue diuretics.  Patient is now on amiodarone secondary to runs 

of ventricular tachycardia yesterday.  Cardiology is on the case. We'll 

continue to follow and make further recommendations based on his clinical 

status.





I, the cosigning physician, performed a history & physical examination of the 

patient. Lungs sounds with crackles in the posterior bases, diminished.  

Maintaining good O2 saturations in the 90s on 3 L/m per nasal cannula.  I 

discussed the assessment and plan of care with my nurse practitioner, Li Wong. I attest to the above note as dictated by her.
Subjective


Progress Note Date: 07/19/18


Principal diagnosis: 





Admission for right lower lobe pneumonia.  Current left-sided pleural effusion.

  Previous medical history of triple-vessel coronary artery disease with left 

main disease and non-ST elevation myocardial infarction status post urgent 

quadruple coronary artery bypass grafting as well as exclusion of the left 

atrial appendage on 06/11/2018, mild to moderate left ventricular dysfunction, 

chronic systolic heart failure with ejection fraction of 40-45% as well as 

diastolic dysfunction, chronic persistent atrial fibrillation on home Coumadin 

therapy, sick sinus syndrome status post pacemaker insertion, hypertension, 

hyperlipidemia, carotid stenosis 50-79% bilaterally, previous tobacco dependence

, severe COPD with FEV1 45% of predicted, arthritis, BPH, GERD, recurrent 

extremity osteomyelitis with MRSA requiring toe and finger amputations, type 2 

diabetes mellitus with previous hemoglobin A1c 5.9%, peripheral vascular disease

, pneumonia, family history of early onset coronary artery disease, and 

previous sputum culture positive for pseudomonas and MSSA.





Patient's currently sitting up in chair in no acute distress.  States his 

shortness of breath has improved significantly.  He did have an episode of 

ventricular arrhythmia last night requiring initiation of IV amiodarone per 

cardiology.





Objective





- Vital Signs


Vital signs: 


 Vital Signs











Temp  97.0 F L  07/19/18 08:00


 


Pulse  86   07/19/18 08:03


 


Resp  18   07/19/18 04:00


 


BP  105/60   07/19/18 08:00


 


Pulse Ox  94 L  07/19/18 08:00








 Intake & Output











 07/18/18 07/19/18 07/19/18





 18:59 06:59 18:59


 


Intake Total 708  240


 


Output Total 0 850 


 


Balance 708 -850 240


 


Weight  71.3 kg 


 


Intake:   


 


  Oral 708  240


 


Output:   


 


  Urine 0 850 


 


  Stool 0  


 


Other:   


 


  Voiding Method Toilet Toilet 





 Urinal  


 


  # Voids 0 1 1


 


  # Bowel Movements 0  














- Constitutional


General appearance: Present: cooperative, no acute distress





- Respiratory


Details: 





Lungs sounds diminished bilaterally with coarse breath sounds in the bases.  

Respirations even, nonlabored.  Currently on 3 L nasal cannula with oxygen 

saturation 97%.  Able to achieve 1500 mL on his incentive spirometry.  

Effective productive cough.





- Cardiovascular


Details: 





S1, S2 present.  Irregular rate and rhythm, atrial fibrillation on telemetry.  

Sternum stable.  Palpable peripheral pulses bilaterally.  No edema present.  No 

calf pain or tenderness noted.  SCDs present.





- Gastrointestinal


Gastrointestinal Comment(s): 





Abdomen soft, nontender, nondistended.  Active bowel sounds 4 quadrants.  

Tolerating diet.





- Genitourinary


Genitourinary Comment(s): 





Continues to void clear, yellow urine.








- Integumentary


Integumentary Comment(s): 





Skin is warm and dry.  Anterior chest incision well healed.  Right lower 

extremity EVH site open, covered with dry intact dressing. 








- Neurologic


Neurologic: Present: CNII-XII intact





- Musculoskeletal


Musculoskeletal: Present: gait normal, strength equal bilaterally





- Psychiatric


Psychiatric: Present: A&O x's 3, appropriate affect, intact judgment & insight





- Allied health notes


Allied health notes reviewed: nursing





- Labs


CBC & Chem 7: 


 07/19/18 05:55





 07/19/18 05:55


Labs: 


 Abnormal Lab Results - Last 24 Hours (Table)











  07/18/18 07/18/18 07/18/18 Range/Units





  11:22 16:43 21:18 


 


RBC     (4.30-5.90)  m/uL


 


Hgb     (13.0-17.5)  gm/dL


 


Hct     (39.0-53.0)  %


 


MCHC     (31.0-37.0)  g/dL


 


RDW     (11.5-15.5)  %


 


Neutrophils #     (1.3-7.7)  k/uL


 


Lymphocytes #     (1.0-4.8)  k/uL


 


INR     (<1.2)  


 


Chloride     ()  mmol/L


 


Carbon Dioxide     (22-30)  mmol/L


 


BUN     (9-20)  mg/dL


 


Glucose     (74-99)  mg/dL


 


POC Glucose (mg/dL)  246 H  179 H  222 H  (75-99)  mg/dL


 


Total Protein     (6.3-8.2)  g/dL


 


Albumin     (3.5-5.0)  g/dL














  07/19/18 07/19/18 07/19/18 Range/Units





  05:55 05:55 05:55 


 


RBC  3.90 L    (4.30-5.90)  m/uL


 


Hgb  10.4 L    (13.0-17.5)  gm/dL


 


Hct  33.9 L    (39.0-53.0)  %


 


MCHC  30.7 L    (31.0-37.0)  g/dL


 


RDW  16.0 H    (11.5-15.5)  %


 


Neutrophils #  8.2 H    (1.3-7.7)  k/uL


 


Lymphocytes #  0.3 L    (1.0-4.8)  k/uL


 


INR   1.2 H   (<1.2)  


 


Chloride    97 L  ()  mmol/L


 


Carbon Dioxide    33 H  (22-30)  mmol/L


 


BUN    30 H  (9-20)  mg/dL


 


Glucose    170 H  (74-99)  mg/dL


 


POC Glucose (mg/dL)     (75-99)  mg/dL


 


Total Protein    5.2 L  (6.3-8.2)  g/dL


 


Albumin    3.0 L  (3.5-5.0)  g/dL














  07/19/18 Range/Units





  06:47 


 


RBC   (4.30-5.90)  m/uL


 


Hgb   (13.0-17.5)  gm/dL


 


Hct   (39.0-53.0)  %


 


MCHC   (31.0-37.0)  g/dL


 


RDW   (11.5-15.5)  %


 


Neutrophils #   (1.3-7.7)  k/uL


 


Lymphocytes #   (1.0-4.8)  k/uL


 


INR   (<1.2)  


 


Chloride   ()  mmol/L


 


Carbon Dioxide   (22-30)  mmol/L


 


BUN   (9-20)  mg/dL


 


Glucose   (74-99)  mg/dL


 


POC Glucose (mg/dL)  177 H  (75-99)  mg/dL


 


Total Protein   (6.3-8.2)  g/dL


 


Albumin   (3.5-5.0)  g/dL








 Microbiology - Last 24 Hours (Table)











 07/17/18 02:49 Blood Culture - Preliminary





 Blood    No Growth after 48 hours


 


 07/18/18 10:35 Gram Stain - Preliminary





 Pleural Fluid Body Fluid Culture - Preliminary


 


 07/18/18 10:35 Anaerobic Culture - Preliminary





 Pleural Fluid 


 


 07/17/18 08:38 Blood Culture - Preliminary





 Blood    No Growth after 24 hours


 


 07/17/18 08:42 Blood Culture - Preliminary





 Blood    No Growth after 24 hours














- Imaging and Cardiology


Chest x-ray: report reviewed, image reviewed





Assessment and Plan


(1) Chronic systolic heart failure


Current Visit: Yes   Status: Chronic   Code(s): I50.22 - CHRONIC SYSTOLIC (

CONGESTIVE) HEART FAILURE   SNOMED Code(s): 695697859


   





(2) Pleural effusion


Current Visit: Yes   Status: Acute   Code(s): J90 - PLEURAL EFFUSION, NOT 

ELSEWHERE CLASSIFIED   SNOMED Code(s): 92507929


   





(3) Pneumonia


Current Visit: Yes   Status: Acute   Code(s): J18.9 - PNEUMONIA, UNSPECIFIED 

ORGANISM   SNOMED Code(s): 827151901


   





(4) Non-ST elevated myocardial infarction


Current Visit: No   Status: Resolved   Code(s): I21.4 - NON-ST ELEVATION (NSTEMI

) MYOCARDIAL INFARCTION   SNOMED Code(s): 094389039


   





(5) BPH (benign prostatic hyperplasia)


Current Visit: Yes   Status: Chronic   Code(s): N40.0 - BENIGN PROSTATIC 

HYPERPLASIA WITHOUT LOWER URINRY TRACT SYMP   SNOMED Code(s): 712590764


   





(6) CAD (coronary artery disease)


Current Visit: Yes   Status: Chronic   Code(s): I25.10 - ATHSCL HEART DISEASE 

OF NATIVE CORONARY ARTERY W/O ANG PCTRS   SNOMED Code(s): 81946120


   





(7) COPD (chronic obstructive pulmonary disease)


Current Visit: Yes   Status: Chronic   Code(s): J44.9 - CHRONIC OBSTRUCTIVE 

PULMONARY DISEASE, UNSPECIFIED   SNOMED Code(s): 60660600


   





(8) Chronic atrial fibrillation


Current Visit: Yes   Status: Chronic   Code(s): I48.2 - CHRONIC ATRIAL 

FIBRILLATION   SNOMED Code(s): 388035153


   





(9) Diabetes mellitus type 2 in nonobese


Current Visit: Yes   Status: Chronic   Code(s): E11.9 - TYPE 2 DIABETES 

MELLITUS WITHOUT COMPLICATIONS   SNOMED Code(s): 820391208


   





(10) Family history of early CAD


Current Visit: Yes   Status: Chronic   Code(s): Z82.49 - FAMILY HX OF ISCHEM 

HEART DIS AND OTH DIS OF THE CIRC SYS   SNOMED Code(s): 016958199


   





(11) GERD (gastroesophageal reflux disease)


Current Visit: Yes   Status: Chronic   Code(s): K21.9 - GASTRO-ESOPHAGEAL 

REFLUX DISEASE WITHOUT ESOPHAGITIS   SNOMED Code(s): 042753887


   





(12) History of Coumadin therapy


Current Visit: Yes   Status: Chronic   Code(s): Z92.29 - PERSONAL HISTORY OF 

OTHER DRUG THERAPY   SNOMED Code(s): 830016613


   





(13) History of permanent cardiac pacemaker placement


Current Visit: Yes   Status: Chronic   Code(s): Z95.0 - PRESENCE OF CARDIAC 

PACEMAKER   SNOMED Code(s): 565830386


   





(14) History of sick sinus syndrome


Current Visit: Yes   Status: Chronic   Code(s): Z86.79 - PERSONAL HISTORY OF 

OTHER DISEASES OF THE CIRCULATORY SYSTEM   SNOMED Code(s): 332861800323702


   





(15) Hypertension


Current Visit: Yes   Status: Chronic   Code(s): I10 - ESSENTIAL (PRIMARY) 

HYPERTENSION   SNOMED Code(s): 07695858


   





(16) On home oxygen therapy


Current Visit: Yes   Status: Chronic   Code(s): Z99.81 - DEPENDENCE ON 

SUPPLEMENTAL OXYGEN   SNOMED Code(s): 775238545848


   





(17) Peripheral vascular disease


Current Visit: Yes   Status: Chronic   Code(s): I73.9 - PERIPHERAL VASCULAR 

DISEASE, UNSPECIFIED   SNOMED Code(s): 720118540


   





(18) History of MRSA infection


Current Visit: No   Status: Resolved   Code(s): Z86.14 - PERSONAL HISTORY OF 

METHICILLIN RESIS STAPH INFECTION   SNOMED Code(s): 718260178


   





(19) History of smoking


Current Visit: No   Status: Resolved   Code(s): Z87.891 - PERSONAL HISTORY OF 

NICOTINE DEPENDENCE   SNOMED Code(s): 47803190488420024


   





(20) History of coronary artery bypass graft


Current Visit: Yes   Status: Chronic   Code(s): Z95.1 - PRESENCE OF 

AORTOCORONARY BYPASS GRAFT   SNOMED Code(s): 869933501


   


Plan: 





1.  Continue aspirin, statin, beta blocker.  Will increase beta blocker therapy 

as tolerated.


2.  May restart Coumadin from our standpoint.  Goal INR 2-2.5.


3.  Amiodarone therapy per cardiology.


4.  Wean O2 as tolerated.  Encourage incentive spirometry use 10 times every 

hour.


5.  Bronchodilators, steroids per Dr. Del Cid.


6.  Continue antibiotics. 


7.  Will monitor daily labs and x-rays.


8.  Increase activity, ambulate as tolerated.  PT/OT following.


9.  GI/DVT prophylaxis.


10.  Insulin, other medical comorbidities per Dr. Griffiths.


11.  Patient appears to be back to baseline.  May discharge to subacute rehab 

versus home from our standpoint when okay with other consultants.








Time with Patient: Greater than 30
Subjective


Progress Note Date: 07/20/18


Principal diagnosis: 





Acute on chronic hypoxic respiratory failure secondary to bilateral healthcare 

acquired pneumonia with left pleural effusion, COPD exacerbation, systolic 

congestive heart failure exacerbation.





This is a very pleasant 78-year-old gentleman who follows with Dr. Vang as 

his primary care physician.  He has a history of hypertension, osteomyelitis, 

peripheral vascular occlusive disease, diabetic neuropathy, diabetes mellitus 

type 2, chronic atrial fibrillation, chronic hypoxic respiratory failure with 

severe COPD, Gold stage III with an FEV1 value of 45% of predicted.  He follows 

in our office for the same.  He has a history of pseudomonas aeruginosa and 

oxacillin sensitive Staphylococcus aureus positive sputum cultures in June 2018.  He also has history of coronary artery disease and recently sustained a 

non-ST segment myocardial infarction requiring subsequent coronary artery 

bypass grafting utilizing a LIMA to the LAD, reverse saphenous vein graft to 

the OM1, OM 2, RCA.  He had been seen by Dr. Del Cid last week and follow-up in 

the office.  He was found to have a left pleural effusion measuring 10 cm with 

shortness of breath.  He was asked to hold his warfarin and the plan was for a 

left thoracentesis to be done in the outpatient setting today.  However, he 

presented to the emergency room approximately at 2:00 this morning from the 

[facility] with worsening shortness of breath and coughing up pink 

frothy sputum.  CT angiogram ruled out pulmonary embolism.  There was a noted 

consolidation involving the right lower lobe with subsegmental changes in the 

left lower lobe and endobronchial opacification suspicious for pneumonia.  

There is a left pleural effusion layering posteriorly with mild loculation.  He 

was seen and evaluated in the emergency room this morning with Dr. Del Cid.  He 

is awake and alert in mild respiratory distress.  He initially required 15L 

nonrebreather mask.  He is currently on 5 L/m per nasal cannula to maintain O2 

saturations in the 90s.  Afebrile.  He slightly tachycardic.  Atrial 

fibrillation.  White count 17.7.  Hemoglobin 13.3.  INR 1.3.  Creatinine 0.70.  

ProBNP 2480.  He has been initiated on Zosyn and Levaquin.





The patient is seen again today 07/18/2018 in follow-up on the selective care 

unit.  He is currently sitting up in a chair at the bedside.  He is awake and 

alert in no acute distress.  He is breathing quite a bit better today as 

compared to yesterday.  He continues with a loose nonproductive cough.  He did 

undergo a left-sided thoracentesis this morning by interventional radiology in 

approximately 1.06 L of serosanguineous fluid was removed.  All of chest x-ray 

revealed no evidence complications from the thoracentesis.  There is a right 

lower lobe atelectasis.  He is working well with the incentive spirometer.  

Maintaining O2 saturations in the 90s on 3 L/m per nasal cannula.  

Hemodynamically stable.  Sputum culture pending.  Blood culture reveals no 

growth to date.  White count 12.9.  Hemoglobin 11.2.  INR 1.3.  Creatinine 

0.80. 





The patient is seen again today 07/19/2018 in follow-up on the selective care 

unit.  He is awake and alert in no acute distress.  He is breathing quite a bit 

easier today as compared to yesterday.  Nearly back to his baseline.  He 

sitting up in a chair at the bedside.  He denies any worsening shortness of 

breath, cough or congestion.  Today's chest x-ray shows near complete 

resolution of the pleural effusion status post thoracentesis on the left.  

There is some minimal blunting of left costophrenic angle.  Maintaining good O2 

saturations in the mid 90s on 3 L/m per nasal cannula.  He is afebrile.  White 

count 9.0.  Hemoglobin 10.0.  Creatinine 0.69.  He did have runs of 

nonsustained ventricular tachycardia requiring the initiation of amiodarone.





The patient is seen again today 07/20/2018 in follow-up on the selective care 

unit.  He's been up ambulating with assistance today.  He is doing quite well.  

He denies any worsening shortness of breath, cough or congestion.  He is 

maintaining O2 saturations up to 100% on 3 L/m per nasal cannula.  She's been 

afebrile.  Hemodynamically stable.  Chest x-ray shows improved aeration in the 

right lung base.  There is a small stable left pleural effusion.  Blood, sputum

, pleural fluid cultures are negative thus far.





Objective





- Vital Signs


Vital signs: 


 Vital Signs











Temp  96.7 F L  07/20/18 08:00


 


Pulse  88   07/20/18 12:07


 


Resp  18   07/20/18 08:00


 


BP  109/56   07/20/18 08:00


 


Pulse Ox  100   07/20/18 08:00








 Intake & Output











 07/19/18 07/20/18 07/20/18





 18:59 06:59 18:59


 


Intake Total 766 250 240


 


Output Total  2 


 


Balance 766 248 240


 


Weight  72.5 kg 


 


Intake:   


 


  Intake, IV Titration 50 50 





  Amount   


 


    Piperacillin-Tazobactam 3 50 50 





    .375 gm In Dextrose/Water   





    1 50ml.bag @ 12.5 mls/hr   





    IVPB Q8HR [facility] Rx#:   





    [id number]   


 


  Oral 716 200 240


 


Output:   


 


  Stool  2 


 


Other:   


 


  Voiding Method  Toilet 


 


  # Voids 1 2 


 


  # Bowel Movements 1  














- Exam





- Constitutional


General appearance: cooperative, no acute distress





- EENT


Eyes: EOMI, PERRLA


ENT: hearing grossly normal


Ears: bilateral: normal





- Neck


Carotids: bilateral: upstroke normal


Thyroid: bilateral: normal size





- Respiratory


Respiratory: bilateral: diminished, rales





- Cardiovascular


Rhythm: irregularly irregular


Heart sounds: normal: S1, S2





- Gastrointestinal


General gastrointestinal: normal bowel sounds





- Integumentary


Integumentary: decreased turgor





- Neurologic


Neurologic: CNII-XII intact





- Musculoskeletal


Musculoskeletal: generalized weakness





- Psychiatric


Psychiatric: A&O x's 3, appropriate affect, intact judgment & insight





- Labs


CBC & Chem 7: 


 07/20/18 05:54





 07/20/18 05:54


Labs: 


 Abnormal Lab Results - Last 24 Hours (Table)











  07/19/18 07/19/18 07/20/18 Range/Units





  16:54 21:16 05:33 


 


RBC     (4.30-5.90)  m/uL


 


Hgb     (13.0-17.5)  gm/dL


 


Hct     (39.0-53.0)  %


 


MCHC     (31.0-37.0)  g/dL


 


RDW     (11.5-15.5)  %


 


Neutrophils #     (1.3-7.7)  k/uL


 


Lymphocytes #     (1.0-4.8)  k/uL


 


INR     (<1.2)  


 


Sodium     (137-145)  mmol/L


 


BUN     (9-20)  mg/dL


 


Glucose     (74-99)  mg/dL


 


POC Glucose (mg/dL)  159 H  202 H  182 H  (75-99)  mg/dL














  07/20/18 07/20/18 07/20/18 Range/Units





  05:54 05:54 05:54 


 


RBC   4.00 L   (4.30-5.90)  m/uL


 


Hgb   10.4 L   (13.0-17.5)  gm/dL


 


Hct   34.7 L   (39.0-53.0)  %


 


MCHC   29.9 L   (31.0-37.0)  g/dL


 


RDW   15.8 H   (11.5-15.5)  %


 


Neutrophils #   9.3 H   (1.3-7.7)  k/uL


 


Lymphocytes #   0.3 L   (1.0-4.8)  k/uL


 


INR  1.2 H    (<1.2)  


 


Sodium    135 L  (137-145)  mmol/L


 


BUN    34 H  (9-20)  mg/dL


 


Glucose    155 H  (74-99)  mg/dL


 


POC Glucose (mg/dL)     (75-99)  mg/dL














  07/20/18 Range/Units





  11:23 


 


RBC   (4.30-5.90)  m/uL


 


Hgb   (13.0-17.5)  gm/dL


 


Hct   (39.0-53.0)  %


 


MCHC   (31.0-37.0)  g/dL


 


RDW   (11.5-15.5)  %


 


Neutrophils #   (1.3-7.7)  k/uL


 


Lymphocytes #   (1.0-4.8)  k/uL


 


INR   (<1.2)  


 


Sodium   (137-145)  mmol/L


 


BUN   (9-20)  mg/dL


 


Glucose   (74-99)  mg/dL


 


POC Glucose (mg/dL)  153 H  (75-99)  mg/dL








 Microbiology - Last 24 Hours (Table)











 07/18/18 10:35 Gram Stain - Preliminary





 Pleural Fluid Body Fluid Culture - Preliminary


 


 07/18/18 10:35 Anaerobic Culture - Preliminary





 Pleural Fluid 


 


 07/17/18 08:38 Blood Culture - Preliminary





 Blood    No Growth after 72 hours


 


 07/17/18 08:42 Blood Culture - Preliminary





 Blood    No Growth after 72 hours


 


 07/17/18 20:54 Gram Stain - Final





 Sputum Sputum Culture - Final


 


 07/17/18 02:49 Blood Culture - Preliminary





 Blood    No Growth after 72 hours














Assessment and Plan


Assessment: 





Impression:





#1 Acute on chronic hypoxic respiratory failure secondary to bilateral 

healthcare acquired pneumonia with left pleural effusion, COPD exacerbation and 

CHF exacerbation





#2 Left pleural effusion, warfarin on hold for plans for an outpatient 

thoracentesis today prior to his arrival to the emergency room.  Status post 

left thoracentesis today by interventional radiology.  Approximately 1.6 L of 

serosanguinous removed.





#3 Acute exacerbation of oxygen dependent chronic obstructive pulmonary disease

, Gold stage III with FEV1 value of 45% of predicted.





#4 Recent pneumonia with positive sputum cultures of Pseudomonas and oxacillin 

sensitive Staphylococcus aureus from 06/11/2018.





#5 Acute exacerbation of chronic systolic congestive heart failure.  Mild to 

moderately impaired left ventricular systolic function with ejection fraction 40

-45%.





#6 Atrial fibrillation with varying ventricular response.  Warfarin resumed.





#7 Recent non-ST segment elevation myocardial infarction and subsequent 

coronary artery bypass grafting on 06/11/2018.  LIMA to the LAD, reverse this 

saphenous vein grafts to the first obtuse marginal artery, second obtuse 

marginal artery, right coronary artery.





#8 Diabetes mellitus, type II.





#9 Diabetic neuropathy.





#10 Peripheral vascular occlusive disease.  Multiple surgeries including 

palpitations of the fourth toe bilaterally.





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 

right second finger.





#12 Hypertension, history of.





#13 History of peptic ulcer disease with partial gastrectomy and colectomy. 





#14 ventricular tachycardia, initiated on amiodarone





#15 Poor overall functional performance based on the above-mentioned multiple 

comorbidities.





Plan:





The patient was seen and evaluated by Dr. Del Cid.  The patient is stable from 

the pulmonary standpoint.  We will switch him to oral Augmentin.  Convert to 

oral prednisone.  Resume warfarin.  Discharge once cleared by cardiology.  We'

ll continue to follow and make further recommendations based on his clinical 

status.





I, the cosigning physician, performed a history & physical examination of the 

patient. Lungs sounds with crackles in the posterior bases, diminished.  

Maintaining good O2 saturations in the 90s on 3 L/m per nasal cannula.  I 

discussed the assessment and plan of care with my nurse practitioner, Li Wong. I attest to the above note as dictated by her.
Subjective


Progress Note Date: 07/20/18


Principal diagnosis: 





Admission for right lower lobe pneumonia.  Current left-sided pleural effusion.

  Previous medical history of triple-vessel coronary artery disease with left 

main disease and non-ST elevation myocardial infarction status post urgent 

quadruple coronary artery bypass grafting as well as exclusion of the left 

atrial appendage on 06/11/2018, mild to moderate left ventricular dysfunction, 

chronic systolic heart failure with ejection fraction of 40-45% as well as 

diastolic dysfunction, chronic persistent atrial fibrillation on home Coumadin 

therapy, sick sinus syndrome status post pacemaker insertion, hypertension, 

hyperlipidemia, carotid stenosis 50-79% bilaterally, previous tobacco dependence

, severe COPD with FEV1 45% of predicted, arthritis, BPH, GERD, recurrent 

extremity osteomyelitis with MRSA requiring toe and finger amputations, type 2 

diabetes mellitus with previous hemoglobin A1c 5.9%, peripheral vascular disease

, pneumonia, family history of early onset coronary artery disease, and 

previous sputum culture positive for pseudomonas and MSSA.





The patient is sitting up to the bedside chair.  He is in no acute distress.  

He reports since having the fluid removed off his left chest his breathing has 

improved significantly.  He denies any complaints of pain or shortness of 

breath at this time.  He has had no further episodes of ventricular arrhythmias 

since the amiodarone has been started.  He is anxious to be discharged back to 

the subacute rehab.





Objective





- Vital Signs


Vital signs: 


 Vital Signs











Temp  97.1 F L  07/20/18 11:00


 


Pulse  88   07/20/18 12:07


 


Resp  18   07/20/18 11:00


 


BP  101/64   07/20/18 11:00


 


Pulse Ox  98   07/20/18 11:00








 Intake & Output











 07/19/18 07/20/18 07/20/18





 18:59 06:59 18:59


 


Intake Total 766 250 480


 


Output Total  2 800


 


Balance 766 248 -320


 


Weight  72.5 kg 


 


Intake:   


 


  Intake, IV Titration 50 50 





  Amount   


 


    Piperacillin-Tazobactam 3 50 50 





    .375 gm In Dextrose/Water   





    1 50ml.bag @ 12.5 mls/hr   





    IVPB Q8HR [facility] Rx#:   





    [id number]   


 


  Oral 716 200 480


 


Output:   


 


  Urine   800


 


  Stool  2 


 


Other:   


 


  Voiding Method  Toilet 


 


  # Voids 1 2 


 


  # Bowel Movements 1  














- Constitutional


General appearance: Present: cooperative, no acute distress





- Respiratory


Details: 





Lung sounds are essentially clear throughout, diminished to his bilateral bases 

with few scattered crackles.  Respirations are symmetrical and nonlabored.  

Oxygen saturation are 98% on 3 L nasal cannula.  He is achieving 1500 mL on his 

incentive spirometry.





- Cardiovascular


Details: 





Irregular rhythm with controlled rate.  S1 and S2 present, negative for S3, 

gallop or murmur.  Sternum is stable.  Remote telemetry showing atrial 

fibrillation heart rate 94.  No edema present.  Sequential compression devices 

in place to his bilateral lower extremities.





- Gastrointestinal


Gastrointestinal Comment(s): 





Abdomen is soft, nontender and nondistended.  Active bowel sounds all 4 power 

quadrants.  Tolerating oral intake.  No guarding or rigidity.  No organomegaly.





- Genitourinary


Genitourinary Comment(s): 





Voiding clear yellow urine.





- Integumentary


Integumentary Comment(s): 





Skin is warm and dry.  No clubbing or cyanosis present.  Midline sternal 

incision clean dry and approximated, healed.  Right lower extremity EVH site 

with scant serosanguineous drainage.  Dressing is clean dry and intact.  Left 

heel dressing clean dry and intact.





- Neurologic


Neurologic: Present: CNII-XII intact





- Musculoskeletal


Musculoskeletal: Present: gait normal, generalized weakness, strength equal 

bilaterally





- Psychiatric


Psychiatric: Present: A&O x's 3, appropriate affect, intact judgment & insight





- Allied health notes


Allied health notes reviewed: nursing





- Labs


CBC & Chem 7: 


 07/20/18 05:54





 07/20/18 05:54


Labs: 


 Abnormal Lab Results - Last 24 Hours (Table)











  07/19/18 07/19/18 07/20/18 Range/Units





  16:54 21:16 05:33 


 


RBC     (4.30-5.90)  m/uL


 


Hgb     (13.0-17.5)  gm/dL


 


Hct     (39.0-53.0)  %


 


MCHC     (31.0-37.0)  g/dL


 


RDW     (11.5-15.5)  %


 


Neutrophils #     (1.3-7.7)  k/uL


 


Lymphocytes #     (1.0-4.8)  k/uL


 


INR     (<1.2)  


 


Sodium     (137-145)  mmol/L


 


BUN     (9-20)  mg/dL


 


Glucose     (74-99)  mg/dL


 


POC Glucose (mg/dL)  159 H  202 H  182 H  (75-99)  mg/dL














  07/20/18 07/20/18 07/20/18 Range/Units





  05:54 05:54 05:54 


 


RBC   4.00 L   (4.30-5.90)  m/uL


 


Hgb   10.4 L   (13.0-17.5)  gm/dL


 


Hct   34.7 L   (39.0-53.0)  %


 


MCHC   29.9 L   (31.0-37.0)  g/dL


 


RDW   15.8 H   (11.5-15.5)  %


 


Neutrophils #   9.3 H   (1.3-7.7)  k/uL


 


Lymphocytes #   0.3 L   (1.0-4.8)  k/uL


 


INR  1.2 H    (<1.2)  


 


Sodium    135 L  (137-145)  mmol/L


 


BUN    34 H  (9-20)  mg/dL


 


Glucose    155 H  (74-99)  mg/dL


 


POC Glucose (mg/dL)     (75-99)  mg/dL














  07/20/18 Range/Units





  11:23 


 


RBC   (4.30-5.90)  m/uL


 


Hgb   (13.0-17.5)  gm/dL


 


Hct   (39.0-53.0)  %


 


MCHC   (31.0-37.0)  g/dL


 


RDW   (11.5-15.5)  %


 


Neutrophils #   (1.3-7.7)  k/uL


 


Lymphocytes #   (1.0-4.8)  k/uL


 


INR   (<1.2)  


 


Sodium   (137-145)  mmol/L


 


BUN   (9-20)  mg/dL


 


Glucose   (74-99)  mg/dL


 


POC Glucose (mg/dL)  153 H  (75-99)  mg/dL








 Microbiology - Last 24 Hours (Table)











 07/18/18 10:35 Gram Stain - Preliminary





 Pleural Fluid Body Fluid Culture - Preliminary


 


 07/18/18 10:35 Anaerobic Culture - Preliminary





 Pleural Fluid 


 


 07/17/18 08:38 Blood Culture - Preliminary





 Blood    No Growth after 72 hours


 


 07/17/18 08:42 Blood Culture - Preliminary





 Blood    No Growth after 72 hours


 


 07/17/18 20:54 Gram Stain - Final





 Sputum Sputum Culture - Final


 


 07/17/18 02:49 Blood Culture - Preliminary





 Blood    No Growth after 72 hours














- Imaging and Cardiology


Chest x-ray: report reviewed, image reviewed





Assessment and Plan


(1) Pleural effusion


Current Visit: Yes   Status: Acute   Code(s): J90 - PLEURAL EFFUSION, NOT 

ELSEWHERE CLASSIFIED   SNOMED Code(s): 71843192


   





(2) BPH (benign prostatic hyperplasia)


Current Visit: Yes   Status: Chronic   Code(s): N40.0 - BENIGN PROSTATIC 

HYPERPLASIA WITHOUT LOWER URINRY TRACT SYMP   SNOMED Code(s): 973136938


   





(3) CAD (coronary artery disease)


Current Visit: Yes   Status: Chronic   Code(s): I25.10 - ATHSCL HEART DISEASE 

OF NATIVE CORONARY ARTERY W/O ANG PCTRS   SNOMED Code(s): 48814046


   





(4) COPD (chronic obstructive pulmonary disease)


Current Visit: Yes   Status: Chronic   Code(s): J44.9 - CHRONIC OBSTRUCTIVE 

PULMONARY DISEASE, UNSPECIFIED   SNOMED Code(s): 62684316


   





(5) Chronic atrial fibrillation


Current Visit: Yes   Status: Chronic   Code(s): I48.2 - CHRONIC ATRIAL 

FIBRILLATION   SNOMED Code(s): 136573169


   





(6) Chronic systolic heart failure


Current Visit: Yes   Status: Chronic   Code(s): I50.22 - CHRONIC SYSTOLIC (

CONGESTIVE) HEART FAILURE   SNOMED Code(s): 828212159


   





(7) Diabetes mellitus type 2 in nonobese


Current Visit: Yes   Status: Chronic   Code(s): E11.9 - TYPE 2 DIABETES 

MELLITUS WITHOUT COMPLICATIONS   SNOMED Code(s): 030964279


   





(8) Family history of early CAD


Current Visit: Yes   Status: Chronic   Code(s): Z82.49 - FAMILY HX OF ISCHEM 

HEART DIS AND OTH DIS OF THE [facility]   SNOMED Code(s): 997832723


   





(9) GERD (gastroesophageal reflux disease)


Current Visit: Yes   Status: Chronic   Code(s): K21.9 - GASTRO-ESOPHAGEAL 

REFLUX DISEASE WITHOUT ESOPHAGITIS   SNOMED Code(s): 724230593


   





(10) History of Coumadin therapy


Current Visit: Yes   Status: Chronic   Code(s): Z92.29 - PERSONAL HISTORY OF 

OTHER DRUG THERAPY   SNOMED Code(s): 023176700


   





(11) History of coronary artery bypass graft


Current Visit: Yes   Status: Chronic   Code(s): Z95.1 - PRESENCE OF 

AORTOCORONARY BYPASS GRAFT   SNOMED Code(s): 443432973


   





(12) History of permanent cardiac pacemaker placement


Current Visit: Yes   Status: Chronic   Code(s): Z95.0 - PRESENCE OF CARDIAC 

PACEMAKER   SNOMED Code(s): 146836680


   





(13) History of sick sinus syndrome


Current Visit: Yes   Status: Chronic   Code(s): Z86.79 - PERSONAL HISTORY OF 

OTHER DISEASES OF THE CIRCULATORY SYSTEM   SNOMED Code(s): 294216764311643


   





(14) Hypertension


Current Visit: Yes   Status: Chronic   Code(s): I10 - ESSENTIAL (PRIMARY) 

HYPERTENSION   SNOMED Code(s): 93479303


   





(15) On home oxygen therapy


Current Visit: Yes   Status: Chronic   Code(s): Z99.81 - DEPENDENCE ON 

SUPPLEMENTAL OXYGEN   SNOMED Code(s): 825319561587


   





(16) Peripheral vascular disease


Current Visit: Yes   Status: Chronic   Code(s): I73.9 - PERIPHERAL VASCULAR 

DISEASE, UNSPECIFIED   SNOMED Code(s): 687908863


   





(17) Family history of coronary artery disease in brother


Current Visit: No   Status: Acute   Code(s): Z82.49 - FAMILY HX OF ISCHEM HEART 

DIS AND OTH DIS OF THE CIRC SYS   SNOMED Code(s): 114602341


   





(18) History of MRSA infection


Current Visit: No   Status: Resolved   Code(s): Z86.14 - PERSONAL HISTORY OF 

METHICILLIN RESIS STAPH INFECTION   SNOMED Code(s): 324778711


   





(19) History of smoking


Current Visit: No   Status: Resolved   Code(s): Z87.891 - PERSONAL HISTORY OF 

NICOTINE DEPENDENCE   SNOMED Code(s): 40275446595632961


   


Plan: 





1.  Continue aspirin, statin, beta blocker.  Will increase beta blocker therapy 

as tolerated.


2.  Continue Coumadin.  Goal INR 2-2.5.


3.  Amiodarone therapy per cardiology.


4.  Wean O2 as tolerated.  Encourage incentive spirometry use 10 times every 

hour.


5.  Bronchodilators, steroids per Dr. Del Cid.


6.  Continue antibiotics. 


7.  Will monitor daily labs and x-rays.


8.  Increase activity, ambulate as tolerated.  PT/OT following.


9.  GI/DVT prophylaxis.


10.  Insulin, other medical comorbidities per Dr. Griffiths.


11.  Continue localized wound care to his right leg EVH site and left heel.


12.  More recommendations to follow based on patient's clinical course.  May 

discharge to subacute rehab versus home from our standpoint when okay with 

other consultants.


Time with Patient: Greater than 30
Subjective


Progress Note Date: 07/20/18


Principal diagnosis: 





CAD and status post CABG





This is a pleasant 78-year-old gentleman with a past medical history 

significant for coronary artery disease and status post coronary artery that is 

grafting in June 2018 where the patient underwent CABG 4 with LIMA to LAD, 

reverse SVG to OM1 and OM 2, and reverse SVG to RCA, as well as multiple 

comorbid conditions including chronic atrial fibrillation on oral 

anticoagulation was Coumadin, tonic hypoxic respiratory failure secondary to 

COPD, the patient uses oxygen at night, mild cardiomyopathy with an ejection 

fraction of 45%, was transferred from [facility] to the hospital 

because he was short of breath.  The patient was residing at [facility] 

until about 2 days ago when he started noticing that he is coughing a lot and 

he was more short of breath.  No chest pain or chest discomfort.  No dizziness 

or lightheadedness.  And no syncope.  The patient underwent a chest x-ray I 

subsequently ultrasound of the chest and he was found to have large pleural 

effusion measured 10 cm.  The patient was on Coumadin which was held and 

subsequently he underwent yesterday left pleurocentesis.  Today he is feeling 

much better in terms of shortness of breath.  They're also possibility of 

underlying pneumonia and the patient currently is on antibiotic. We get 

involved in the care of the patient because he developed multiple episodes of 

nonsustained ventricular tachycardia with the longest of 30 seconds.  








The patient was on metoprolol and the blood pressure was marginally low and 

because of that I did not increase the dose.  I did start the patient on 

amiodarone IV and subsequently amiodarone by mouth.  The patient did not have 

any episode of nonsustained ventricular tachycardia for the last 24 hours.  The 

repeated echocardiogram continues to show mild cardiomyopathy with EF around 45%

.  





Objective





- Vital Signs


Vital signs: 


 Vital Signs











Temp  96.7 F L  07/20/18 08:00


 


Pulse  88   07/20/18 12:07


 


Resp  18   07/20/18 08:00


 


BP  109/56   07/20/18 08:00


 


Pulse Ox  100   07/20/18 08:00








 Intake & Output











 07/19/18 07/20/18 07/20/18





 18:59 06:59 18:59


 


Intake Total 766 250 240


 


Output Total  2 


 


Balance 766 248 240


 


Weight  72.5 kg 


 


Intake:   


 


  Intake, IV Titration 50 50 





  Amount   


 


    Piperacillin-Tazobactam 3 50 50 





    .375 gm In Dextrose/Water   





    1 50ml.bag @ 12.5 mls/hr   





    IVPB Q8HR [facility] Rx#:   





    [id number]   


 


  Oral 716 200 240


 


Output:   


 


  Stool  2 


 


Other:   


 


  Voiding Method  Toilet 


 


  # Voids 1 2 


 


  # Bowel Movements 1  














- Constitutional


General appearance: Present: no acute distress





- Respiratory


Respiratory: bilateral: rales





- Cardiovascular


Heart sounds: normal: S1, S2





- Labs


CBC & Chem 7: 


 07/20/18 05:54





 07/20/18 05:54


Labs: 


 Abnormal Lab Results - Last 24 Hours (Table)











  07/19/18 07/19/18 07/20/18 Range/Units





  16:54 21:16 05:33 


 


RBC     (4.30-5.90)  m/uL


 


Hgb     (13.0-17.5)  gm/dL


 


Hct     (39.0-53.0)  %


 


MCHC     (31.0-37.0)  g/dL


 


RDW     (11.5-15.5)  %


 


Neutrophils #     (1.3-7.7)  k/uL


 


Lymphocytes #     (1.0-4.8)  k/uL


 


INR     (<1.2)  


 


Sodium     (137-145)  mmol/L


 


BUN     (9-20)  mg/dL


 


Glucose     (74-99)  mg/dL


 


POC Glucose (mg/dL)  159 H  202 H  182 H  (75-99)  mg/dL














  07/20/18 07/20/18 07/20/18 Range/Units





  05:54 05:54 05:54 


 


RBC   4.00 L   (4.30-5.90)  m/uL


 


Hgb   10.4 L   (13.0-17.5)  gm/dL


 


Hct   34.7 L   (39.0-53.0)  %


 


MCHC   29.9 L   (31.0-37.0)  g/dL


 


RDW   15.8 H   (11.5-15.5)  %


 


Neutrophils #   9.3 H   (1.3-7.7)  k/uL


 


Lymphocytes #   0.3 L   (1.0-4.8)  k/uL


 


INR  1.2 H    (<1.2)  


 


Sodium    135 L  (137-145)  mmol/L


 


BUN    34 H  (9-20)  mg/dL


 


Glucose    155 H  (74-99)  mg/dL


 


POC Glucose (mg/dL)     (75-99)  mg/dL














  07/20/18 Range/Units





  11:23 


 


RBC   (4.30-5.90)  m/uL


 


Hgb   (13.0-17.5)  gm/dL


 


Hct   (39.0-53.0)  %


 


MCHC   (31.0-37.0)  g/dL


 


RDW   (11.5-15.5)  %


 


Neutrophils #   (1.3-7.7)  k/uL


 


Lymphocytes #   (1.0-4.8)  k/uL


 


INR   (<1.2)  


 


Sodium   (137-145)  mmol/L


 


BUN   (9-20)  mg/dL


 


Glucose   (74-99)  mg/dL


 


POC Glucose (mg/dL)  153 H  (75-99)  mg/dL








 Microbiology - Last 24 Hours (Table)











 07/17/18 08:38 Blood Culture - Preliminary





 Blood    No Growth after 72 hours


 


 07/17/18 08:42 Blood Culture - Preliminary





 Blood    No Growth after 72 hours


 


 07/17/18 20:54 Gram Stain - Final





 Sputum Sputum Culture - Final


 


 07/17/18 02:49 Blood Culture - Preliminary





 Blood    No Growth after 72 hours


 


 07/18/18 10:35 Gram Stain - Preliminary





 Pleural Fluid Body Fluid Culture - Preliminary














Assessment and Plan


Assessment: 


 


#1 acute on chronic respiratory failure


#2 large left pleural effusion and status post pleurocentesis


#3 status post coronary artery bypass grafting as described above


#4 recurrent nonsustained V. tach


#5 mild ischemic cardiomyopathy with EF of 45%


#6 chronic atrial fibrillation was controlled heart rate


#7 multiple comorbid conditions including hypertension and dyslipidemia





Plan


#1 the patient underwent left pleurocentesis yesterday.  He is feeling better 

indeterminable shortness of breath


#2 the repeated echocardiogram showed an EF of 45%


#3 continue amiodarone by mouth


#4 continue the current dose of metoprolol


#5 follow-up with the patient.





Thank you for allowing us participate his care and we'll continue following up 

with him
Subjective


Progress Note Date: 07/21/18


Principal diagnosis: 





Acute on chronic hypoxic respiratory failure secondary to healthcare acquired 

pneumonia, left pleural effusion, underlying COPD, chronic systolic congestive 

heart failure.





This is a very pleasant 78-year-old gentleman who follows with Dr. Vang as 

his primary care physician.  He has a history of hypertension, osteomyelitis, 

peripheral vascular occlusive disease, diabetic neuropathy, diabetes mellitus 

type 2, chronic atrial fibrillation, chronic hypoxic respiratory failure with 

severe COPD, Gold stage III with an FEV1 value of 45% of predicted.  He follows 

in our office for the same.  He has a history of pseudomonas aeruginosa and 

oxacillin sensitive Staphylococcus aureus positive sputum cultures in June 2018.  He also has history of coronary artery disease and recently sustained a 

non-ST segment myocardial infarction requiring subsequent coronary artery 

bypass grafting utilizing a LIMA to the LAD, reverse saphenous vein graft to 

the OM1, OM 2, RCA.  He had been seen by Dr. Del Cid last week and follow-up in 

the office.  He was found to have a left pleural effusion measuring 10 cm with 

shortness of breath.  He was asked to hold his warfarin and the plan was for a 

left thoracentesis to be done in the outpatient setting today.  However, he 

presented to the emergency room approximately at 2:00 this morning from the 

[facility] with worsening shortness of breath and coughing up pink 

frothy sputum.  CT angiogram ruled out pulmonary embolism.  There was a noted 

consolidation involving the right lower lobe with subsegmental changes in the 

left lower lobe and endobronchial opacification suspicious for pneumonia.  

There is a left pleural effusion layering posteriorly with mild loculation.  He 

was seen and evaluated in the emergency room this morning with Dr. Del Cid.  He 

is awake and alert in mild respiratory distress.  He initially required 15L 

nonrebreather mask.  He is currently on 5 L/m per nasal cannula to maintain O2 

saturations in the 90s.  Afebrile.  He slightly tachycardic.  Atrial 

fibrillation.  White count 17.7.  Hemoglobin 13.3.  INR 1.3.  Creatinine 0.70.  

ProBNP 2480.  He has been initiated on Zosyn and Levaquin.





The patient is seen again today 07/18/2018 in follow-up on the [facility] 

[facility].  He is currently sitting up in a chair at the bedside.  He is awake and 

alert in no acute distress.  He is breathing quite a bit better today as 

compared to yesterday.  He continues with a loose nonproductive cough.  He did 

undergo a left-sided thoracentesis this morning by interventional radiology in 

approximately 1.06 L of serosanguineous fluid was removed.  All of chest x-ray 

revealed no evidence complications from the thoracentesis.  There is a right 

lower lobe atelectasis.  He is working well with the incentive spirometer.  

Maintaining O2 saturations in the 90s on 3 L/m per nasal cannula.  

Hemodynamically stable.  Sputum culture pending.  Blood culture reveals no 

growth to date.  White count 12.9.  Hemoglobin 11.2.  INR 1.3.  Creatinine 

0.80. 





The patient is seen again today 07/19/2018 in follow-up on the [facility] 

unit.  He is awake and alert in no acute distress.  He is breathing quite a bit 

easier today as compared to yesterday.  Nearly back to his baseline.  He 

sitting up in a chair at the bedside.  He denies any worsening shortness of 

breath, cough or congestion.  Today's chest x-ray shows near complete 

resolution of the pleural effusion status post thoracentesis on the left.  

There is some minimal blunting of left costophrenic angle.  Maintaining good O2 

saturations in the mid 90s on 3 L/m per nasal cannula.  He is afebrile.  White 

count 9.0.  Hemoglobin 10.0.  Creatinine 0.69.  He did have runs of 

nonsustained ventricular tachycardia requiring the initiation of amiodarone.





The patient is seen again today 07/20/2018 in follow-up on the [facility] care 

unit.  He's been up ambulating with assistance today.  He is doing quite well.  

He denies any worsening shortness of breath, cough or congestion.  He is 

maintaining O2 saturations up to 100% on 3 L/m per nasal cannula.  She's been 

afebrile.  Hemodynamically stable.  Chest x-ray shows improved aeration in the 

right lung base.  There is a small stable left pleural effusion.  Blood, sputum

, pleural fluid cultures are negative thus far.





Patient was seen today on 7/21/2018, remains on a monitor bed, feeling better, 

breathing easier, patient is already ambulating in the hallway, remains on 

antibiotics for pneumonia involving the right lower lobe, his left pleural 

effusion was drained, and felt much better since the drainage.  He is on 2-3 L 

nasal cannula.  Hemodynamically stable.  Follow-up chest x-ray showed 

improvement.  Patient will likely be discharged to [facility] early Monday.











Objective





- Vital Signs


Vital signs: 


 Vital Signs











Temp  97.0 F L  07/21/18 11:06


 


Pulse  86   07/21/18 11:32


 


Resp  18   07/21/18 11:07


 


BP  107/54   07/21/18 11:06


 


Pulse Ox  95   07/21/18 11:06








 Intake & Output











 07/20/18 07/21/18 07/21/18





 18:59 06:59 18:59


 


Intake Total 480 450 890


 


Output Total 800  


 


Balance -320 450 890


 


Weight  73.1 kg 


 


Intake:   


 


  IV   10


 


    Invasive Line 5   10


 


  Oral 480 450 880


 


Output:   


 


  Urine 800  


 


Other:   


 


  Voiding Method  Toilet Toilet


 


  # Voids  1 


 


  # Bowel Movements  1 














- Exam





Physical Exam: Revealed a 78-year-old white male in no distress, very pleasant.


Head: Atraumatic, normocephalic.


HEENT:[Neck is supple.] [No neck masses.] [No thyromegaly.] [No JVD.]  No 

icterus, moist mucous membranes.


Chest: [Diminished breath sounds at the bases, no crackles, no rhonchi and no 

wheezes.  No chest wall tenderness.]


Cardiac Exam: [Irregular irregular rhythm.  Normal S1 and S2, no S3 gallop, no 

murmur.]


Abdomen: [Soft, nontender,  no megaly, no rebound, no guarding, normal bowel 

sounds.]


Extremities: [No clubbing, no edema, no cyanosis.]


Neurological Exam: [No focal neurologic deficit.


Psychiatric: Normal mood affect and mental status examination.


: No lymphadenopathy.]





- Labs


CBC & Chem 7: 


 07/21/18 05:59





 07/21/18 05:59


Labs: 


 Abnormal Lab Results - Last 24 Hours (Table)











  07/20/18 07/20/18 07/21/18 Range/Units





  17:05 20:59 02:07 


 


RBC     (4.30-5.90)  m/uL


 


Hgb     (13.0-17.5)  gm/dL


 


Hct     (39.0-53.0)  %


 


MCHC     (31.0-37.0)  g/dL


 


RDW     (11.5-15.5)  %


 


Lymphocytes #     (1.0-4.8)  k/uL


 


PT     (9.0-12.0)  sec


 


INR     (<1.2)  


 


Carbon Dioxide     (22-30)  mmol/L


 


BUN     (9-20)  mg/dL


 


POC Glucose (mg/dL)  214 H  221 H  127 H  (75-99)  mg/dL














  07/21/18 07/21/18 07/21/18 Range/Units





  05:31 05:59 05:59 


 


RBC    3.98 L  (4.30-5.90)  m/uL


 


Hgb    10.4 L  (13.0-17.5)  gm/dL


 


Hct    34.4 L  (39.0-53.0)  %


 


MCHC    30.2 L  (31.0-37.0)  g/dL


 


RDW    15.7 H  (11.5-15.5)  %


 


Lymphocytes #    0.6 L  (1.0-4.8)  k/uL


 


PT   13.5 H   (9.0-12.0)  sec


 


INR   1.4 H   (<1.2)  


 


Carbon Dioxide     (22-30)  mmol/L


 


BUN     (9-20)  mg/dL


 


POC Glucose (mg/dL)  100 H    (75-99)  mg/dL














  07/21/18 07/21/18 Range/Units





  05:59 11:11 


 


RBC    (4.30-5.90)  m/uL


 


Hgb    (13.0-17.5)  gm/dL


 


Hct    (39.0-53.0)  %


 


MCHC    (31.0-37.0)  g/dL


 


RDW    (11.5-15.5)  %


 


Lymphocytes #    (1.0-4.8)  k/uL


 


PT    (9.0-12.0)  sec


 


INR    (<1.2)  


 


Carbon Dioxide  34 H   (22-30)  mmol/L


 


BUN  33 H   (9-20)  mg/dL


 


POC Glucose (mg/dL)   195 H  (75-99)  mg/dL








 Microbiology - Last 24 Hours (Table)











 07/18/18 10:35 Gram Stain - Preliminary





 Pleural Fluid Body Fluid Culture - Preliminary


 


 07/17/18 08:38 Blood Culture - Preliminary





 Blood    No Growth after 96 hours


 


 07/17/18 08:42 Blood Culture - Preliminary





 Blood    No Growth after 96 hours


 


 07/17/18 02:49 Blood Culture - Preliminary





 Blood    No Growth after 96 hours


 


 07/18/18 10:35 Anaerobic Culture - Preliminary





 Pleural Fluid 














Assessment and Plan


Assessment: 


#1 Acute on chronic hypoxic respiratory failure secondary to bilateral 

healthcare acquired pneumonia with left pleural effusion, COPD exacerbation and 

CHF exacerbation





#2 Left pleural effusion, warfarin on hold for plans for an outpatient 

thoracentesis today prior to his arrival to the emergency room.  Status post 

left thoracentesis today by interventional radiology.  Approximately 1.6 L of 

serosanguinous removed.





#3 Acute exacerbation of oxygen dependent chronic obstructive pulmonary disease

, Gold stage III with FEV1 value of 45% of predicted.





#4 Recent pneumonia with positive sputum cultures of Pseudomonas and oxacillin 

sensitive Staphylococcus aureus from 06/11/2018.





#5 Acute exacerbation of chronic systolic congestive heart failure.  Mild to 

moderately impaired left ventricular systolic function with ejection fraction 40

-45%.





#6 Atrial fibrillation with varying ventricular response.  Warfarin resumed.





#7 Recent non-ST segment elevation myocardial infarction and subsequent 

coronary artery bypass grafting on 06/11/2018.  LIMA to the LAD, reverse this 

saphenous vein grafts to the first obtuse marginal artery, second obtuse 

marginal artery, right coronary artery.





#8 Diabetes mellitus, type II.





#9 Diabetic neuropathy.





#10 Peripheral vascular occlusive disease.  Multiple surgeries including 

palpitations of the fourth toe bilaterally.





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 

right second finger.





#12 Hypertension, history of.





#13 History of peptic ulcer disease with partial gastrectomy and colectomy. 





#14 ventricular tachycardia, initiated on amiodarone





#15 Poor overall functional performance based on the above-mentioned multiple 

comorbidities.





Recommendation: Continue present treatment plan including oral antibiotics, 

Coumadin, patient was cleared from our perspective for discharge planning, 

however he is to be discharged back to ECF/rehab facility.  Patient can see me 

on outpatient basis post discharge.








Time with Patient: Less than 30
Subjective


Progress Note Date: 07/22/18


Principal diagnosis: 





Acute on chronic hypoxic respiratory failure secondary to healthcare acquired 

pneumonia, left pleural effusion, underlying COPD, chronic systolic congestive 

heart failure.





This is a very pleasant 78-year-old gentleman who follows with Dr. Vang as 

his primary care physician.  He has a history of hypertension, osteomyelitis, 

peripheral vascular occlusive disease, diabetic neuropathy, diabetes mellitus 

type 2, chronic atrial fibrillation, chronic hypoxic respiratory failure with 

severe COPD, Gold stage III with an FEV1 value of 45% of predicted.  He follows 

in our office for the same.  He has a history of pseudomonas aeruginosa and 

oxacillin sensitive Staphylococcus aureus positive sputum cultures in June 2018.  He also has history of coronary artery disease and recently sustained a 

non-ST segment myocardial infarction requiring subsequent coronary artery 

bypass grafting utilizing a LIMA to the LAD, reverse saphenous vein graft to 

the OM1, OM 2, RCA.  He had been seen by Dr. Del Cid last week and follow-up in 

the office.  He was found to have a left pleural effusion measuring 10 cm with 

shortness of breath.  He was asked to hold his warfarin and the plan was for a 

left thoracentesis to be done in the outpatient setting today.  However, he 

presented to the emergency room approximately at 2:00 this morning from the 

[facility] with worsening shortness of breath and coughing up pink 

frothy sputum.  CT angiogram ruled out pulmonary embolism.  There was a noted 

consolidation involving the right lower lobe with subsegmental changes in the 

left lower lobe and endobronchial opacification suspicious for pneumonia.  

There is a left pleural effusion layering posteriorly with mild loculation.  He 

was seen and evaluated in the emergency room this morning with Dr. Del Cid.  He 

is awake and alert in mild respiratory distress.  He initially required 15L 

nonrebreather mask.  He is currently on 5 L/m per nasal cannula to maintain O2 

saturations in the 90s.  Afebrile.  He slightly tachycardic.  Atrial 

fibrillation.  White count 17.7.  Hemoglobin 13.3.  INR 1.3.  Creatinine 0.70.  

ProBNP 2480.  He has been initiated on Zosyn and Levaquin.





The patient is seen again today 07/18/2018 in follow-up on the [facility] 

unit.  He is currently sitting up in a chair at the bedside.  He is awake and 

alert in no acute distress.  He is breathing quite a bit better today as 

compared to yesterday.  He continues with a loose nonproductive cough.  He did 

undergo a left-sided thoracentesis this morning by interventional radiology in 

approximately 1.06 L of serosanguineous fluid was removed.  All of chest x-ray 

revealed no evidence complications from the thoracentesis.  There is a right 

lower lobe atelectasis.  He is working well with the incentive spirometer.  

Maintaining O2 saturations in the 90s on 3 L/m per nasal cannula.  

Hemodynamically stable.  Sputum culture pending.  Blood culture reveals no 

growth to date.  White count 12.9.  Hemoglobin 11.2.  INR 1.3.  Creatinine 

0.80. 





The patient is seen again today 07/19/2018 in follow-up on the [facility] 

unit.  He is awake and alert in no acute distress.  He is breathing quite a bit 

easier today as compared to yesterday.  Nearly back to his baseline.  He 

sitting up in a chair at the bedside.  He denies any worsening shortness of 

breath, cough or congestion.  Today's chest x-ray shows near complete 

resolution of the pleural effusion status post thoracentesis on the left.  

There is some minimal blunting of left costophrenic angle.  Maintaining good O2 

saturations in the mid 90s on 3 L/m per nasal cannula.  He is afebrile.  White 

count 9.0.  Hemoglobin 10.0.  Creatinine 0.69.  He did have runs of 

nonsustained ventricular tachycardia requiring the initiation of amiodarone.





The patient is seen again today 07/20/2018 in follow-up on the selective care 

unit.  He's been up ambulating with assistance today.  He is doing quite well.  

He denies any worsening shortness of breath, cough or congestion.  He is 

maintaining O2 saturations up to 100% on 3 L/m per nasal cannula.  She's been 

afebrile.  Hemodynamically stable.  Chest x-ray shows improved aeration in the 

right lung base.  There is a small stable left pleural effusion.  Blood, sputum

, pleural fluid cultures are negative thus far.





Patient was seen today on 7/21/2018, remains on a monitor bed, feeling better, 

breathing easier, patient is already ambulating in the hallway, remains on 

antibiotics for pneumonia involving the right lower lobe, his left pleural 

effusion was drained, and felt much better since the drainage.  He is on 2-3 L 

nasal cannula.  Hemodynamically stable.  Follow-up chest x-ray showed 

improvement.  Patient will likely be discharged to [facility] early Monday.





Reevaluated today on 7/22/2018, patient is presently on the fifth floor, 

asymptomatic, no cough no wheezing no shortness of breath, continues to do well

, discharge planning is likely in progress to be discharged to a rehab facility 

in the morning.  Patient was admitted from a rehab facility.  His labs were 

reviewed, INR is therapeutic at 2.1.  CBC is relatively normal.  The pleural 

effusion which has been drained was basically transudate of in nature.











Objective





- Vital Signs


Vital signs: 


 Vital Signs











Temp  97.6 F   07/22/18 06:00


 


Pulse  79   07/22/18 11:37


 


Resp  18   07/22/18 06:00


 


BP  107/59   07/22/18 09:01


 


Pulse Ox  99   07/22/18 06:00








 Intake & Output











 07/21/18 07/22/18 07/22/18





 18:59 06:59 18:59


 


Intake Total 890 360 


 


Balance 890 360 


 


Intake:   


 


  IV 10  


 


    Invasive Line 5 10  


 


  Oral 880 360 


 


Other:   


 


  Voiding Method Toilet Toilet Toilet


 


  # Voids 1 2 














- Exam





Physical Exam: Revealed a 78-year-old white male in no distress, very pleasant.


Head: Atraumatic, normocephalic.


HEENT:[Neck is supple.] [No neck masses.] [No thyromegaly.] [No JVD.]  No 

icterus, moist mucous membranes.


Chest: [Diminished breath sounds at the bases, no crackles, no rhonchi and no 

wheezes.  No chest wall tenderness.]


Cardiac Exam: [Irregular irregular rhythm.  Normal S1 and S2, no S3 gallop, no 

murmur.]


Abdomen: [Soft, nontender,  no megaly, no rebound, no guarding, normal bowel 

sounds.]


Extremities: [No clubbing, no edema, no cyanosis.]


Neurological Exam: [No focal neurologic deficit.


Psychiatric: Normal mood affect and mental status examination.


: No lymphadenopathy.]





- Labs


CBC & Chem 7: 


 07/21/18 05:59





 07/21/18 05:59


Labs: 


 Abnormal Lab Results - Last 24 Hours (Table)











  07/21/18 07/21/18 07/22/18 Range/Units





  17:05 20:15 07:14 


 


PT    19.4 H  (9.0-12.0)  sec


 


INR    2.1 H  (<1.2)  


 


POC Glucose (mg/dL)  190 H  275 H   (75-99)  mg/dL














  07/22/18 Range/Units





  12:12 


 


PT   (9.0-12.0)  sec


 


INR   (<1.2)  


 


POC Glucose (mg/dL)  123 H  (75-99)  mg/dL








 Microbiology - Last 24 Hours (Table)











 07/17/18 08:38 Blood Culture - Preliminary





 Blood    No Growth after 120 hours


 


 07/17/18 08:42 Blood Culture - Preliminary





 Blood    No Growth after 120 hours


 


 07/18/18 10:35 Anaerobic Culture - Final





 Pleural Fluid 


 


 07/18/18 10:35 Gram Stain - Final





 Pleural Fluid Body Fluid Culture - Final


 


 07/17/18 02:49 Blood Culture - Preliminary





 Blood    No Growth after 120 hours














Assessment and Plan


Assessment: 


#1 Acute on chronic hypoxic respiratory failure secondary to bilateral 

healthcare acquired pneumonia with left pleural effusion, COPD exacerbation and 

CHF exacerbation





#2 Left pleural effusion, warfarin on hold for plans for an outpatient 

thoracentesis today prior to his arrival to the emergency room.  Status post 

left thoracentesis today by interventional radiology.  Approximately 1.6 L of 

serosanguinous removed.





#3 Acute exacerbation of oxygen dependent chronic obstructive pulmonary disease

, Gold stage III with FEV1 value of 45% of predicted.





#4 Recent pneumonia with positive sputum cultures of Pseudomonas and oxacillin 

sensitive Staphylococcus aureus from 06/11/2018.





#5 Acute exacerbation of chronic systolic congestive heart failure.  Mild to 

moderately impaired left ventricular systolic function with ejection fraction 40

-45%.





#6 Atrial fibrillation with varying ventricular response.  Warfarin resumed.





#7 Recent non-ST segment elevation myocardial infarction and subsequent 

coronary artery bypass grafting on 06/11/2018.  LIMA to the LAD, reverse this 

saphenous vein grafts to the first obtuse marginal artery, second obtuse 

marginal artery, right coronary artery.





#8 Diabetes mellitus, type II.





#9 Diabetic neuropathy.





#10 Peripheral vascular occlusive disease.  Multiple surgeries including 

palpitations of the fourth toe bilaterally.





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 

right second finger.





#12 Hypertension, history of.





#13 History of peptic ulcer disease with partial gastrectomy and colectomy. 





#14 ventricular tachycardia, initiated on amiodarone





#15 Poor overall functional performance based on the above-mentioned multiple 

comorbidities.





Recommendation: Continue present treatment plan including oral antibiotics, 

Coumadin, consider discharge planning in the morning to rehab facility.  See me 

in the office in one week for follow-up.


Time with Patient: Less than 30
Subjective


Progress Note Date: 07/23/18


Principal diagnosis: 





Admission for right lower lobe pneumonia.  Current left-sided pleural effusion.

  Previous medical history of triple-vessel coronary artery disease with left 

main disease and non-ST elevation myocardial infarction status post urgent 

quadruple coronary artery bypass grafting as well as exclusion of the left 

atrial appendage on 06/11/2018, mild to moderate left ventricular dysfunction, 

chronic systolic heart failure with ejection fraction of 40-45% as well as 

diastolic dysfunction, chronic persistent atrial fibrillation on home Coumadin 

therapy, sick sinus syndrome status post pacemaker insertion, hypertension, 

hyperlipidemia, carotid stenosis 50-79% bilaterally, previous tobacco dependence

, severe COPD with FEV1 45% of predicted, arthritis, BPH, GERD, recurrent 

extremity osteomyelitis with MRSA requiring toe and finger amputations, type 2 

diabetes mellitus with previous hemoglobin A1c 5.9%, peripheral vascular disease

, pneumonia, family history of early onset coronary artery disease, and 

previous sputum culture positive for pseudomonas and MSSA.





POD #5 left-sided ultrasound guided thoracentesis performed by interventional 

radiology with approximately 1 L serosanguineous fluid removal.





Patient's currently sitting up in the chair in no acute distress.  States his 

symptoms have improved significantly and he feels ready to go back to rehab.





Objective





- Vital Signs


Vital signs: 


 Vital Signs











Temp  97.5 F L  07/23/18 06:06


 


Pulse  76   07/23/18 08:50


 


Resp  18   07/23/18 06:06


 


BP  107/70   07/23/18 06:06


 


Pulse Ox  99   07/23/18 08:26








 Intake & Output











 07/22/18 07/23/18 07/23/18





 18:59 06:59 18:59


 


Other:   


 


  Voiding Method Toilet Toilet Toilet


 


  # Voids 1 1 


 


  # Bowel Movements 1  














- Constitutional


General appearance: Present: cooperative, no acute distress





- Respiratory


Details: 





Lungs sounds diminished on the left.  Respirations even, nonlabored.  Currently 

on 2 L nasal cannula with oxygen saturation 98%.  Able to achieve 1500 mL on 

his incentive spirometry.  Effective productive cough with yellow sputum.








- Cardiovascular


Details: 





S1, S2 present.  Irregular rate and rhythm, atrial fibrillation on telemetry.  

Sternum stable.  Palpable peripheral pulses bilaterally.  No edema present.  No 

calf pain or tenderness noted.  SCDs present.








- Gastrointestinal


Gastrointestinal Comment(s): 





Abdomen soft, nontender, nondistended.  Active bowel sounds 4 quadrants.  

Tolerating diet.  Positive bowel movement








- Genitourinary


Genitourinary Comment(s): 





Continues to void clear, yellow urine.








- Integumentary


Integumentary Comment(s): 





Skin is warm and dry.  Anterior chest incision well healed.  Right lower 

extremity EVH site open, scant serosanguineous drainage, covered with dry 

intact dressing. 








- Neurologic


Neurologic: Present: CNII-XII intact





- Musculoskeletal


Musculoskeletal: Present: gait normal, strength equal bilaterally





- Psychiatric


Psychiatric: Present: A&O x's 3, appropriate affect, intact judgment & insight





- Allied health notes


Allied health notes reviewed: nursing





- Labs


CBC & Chem 7: 


 07/21/18 05:59





 07/21/18 05:59


Labs: 


 Abnormal Lab Results - Last 24 Hours (Table)











  07/22/18 07/22/18 07/22/18 Range/Units





  12:12 17:27 20:04 


 


POC Glucose (mg/dL)  123 H  220 H  190 H  (75-99)  mg/dL














  07/23/18 Range/Units





  02:10 


 


POC Glucose (mg/dL)  128 H  (75-99)  mg/dL








 Microbiology - Last 24 Hours (Table)











 07/17/18 02:49 Blood Culture - Final





 Blood    No Growth after 144 hours


 


 07/17/18 08:38 Blood Culture - Preliminary





 Blood    No Growth after 120 hours


 


 07/17/18 08:42 Blood Culture - Preliminary





 Blood    No Growth after 120 hours


 


 07/18/18 10:35 Anaerobic Culture - Final





 Pleural Fluid 


 


 07/18/18 10:35 Gram Stain - Final





 Pleural Fluid Body Fluid Culture - Final














- Imaging and Cardiology


Chest x-ray: report reviewed, image reviewed





Assessment and Plan


(1) Chronic systolic heart failure


Current Visit: Yes   Status: Chronic   Code(s): I50.22 - CHRONIC SYSTOLIC (

CONGESTIVE) HEART FAILURE   SNOMED Code(s): 765273676


   





(2) Pleural effusion


Current Visit: Yes   Status: Acute   Code(s): J90 - PLEURAL EFFUSION, NOT 

ELSEWHERE CLASSIFIED   SNOMED Code(s): 75527742


   





(3) Pneumonia


Current Visit: Yes   Status: Acute   Code(s): J18.9 - PNEUMONIA, UNSPECIFIED 

ORGANISM   SNOMED Code(s): 780025958


   





(4) Non-ST elevated myocardial infarction


Current Visit: No   Status: Resolved   Code(s): I21.4 - NON-ST ELEVATION (NSTEMI

) MYOCARDIAL INFARCTION   SNOMED Code(s): 070817605


   





(5) BPH (benign prostatic hyperplasia)


Current Visit: Yes   Status: Chronic   Code(s): N40.0 - BENIGN PROSTATIC 

HYPERPLASIA WITHOUT LOWER URINRY TRACT SYMP   SNOMED Code(s): 423986266


   





(6) CAD (coronary artery disease)


Current Visit: Yes   Status: Chronic   Code(s): I25.10 - ATHSCL HEART DISEASE 

OF NATIVE CORONARY ARTERY W/O ANG PCTRS   SNOMED Code(s): 14261177


   





(7) COPD (chronic obstructive pulmonary disease)


Current Visit: Yes   Status: Chronic   Code(s): J44.9 - CHRONIC OBSTRUCTIVE 

PULMONARY DISEASE, UNSPECIFIED   SNOMED Code(s): 52671322


   





(8) Chronic atrial fibrillation


Current Visit: Yes   Status: Chronic   Code(s): I48.2 - CHRONIC ATRIAL 

FIBRILLATION   SNOMED Code(s): 668937232


   





(9) Diabetes mellitus type 2 in nonobese


Current Visit: Yes   Status: Chronic   Code(s): E11.9 - TYPE 2 DIABETES 

MELLITUS WITHOUT COMPLICATIONS   SNOMED Code(s): 961467386


   





(10) Family history of early CAD


Current Visit: Yes   Status: Chronic   Code(s): Z82.49 - FAMILY HX OF ISCHEM 

HEART DIS AND OTH DIS OF THE CIRC SYS   SNOMED Code(s): 970150115


   





(11) GERD (gastroesophageal reflux disease)


Current Visit: Yes   Status: Chronic   Code(s): K21.9 - GASTRO-ESOPHAGEAL 

REFLUX DISEASE WITHOUT ESOPHAGITIS   SNOMED Code(s): 876685342


   





(12) History of Coumadin therapy


Current Visit: Yes   Status: Chronic   Code(s): Z92.29 - PERSONAL HISTORY OF 

OTHER DRUG THERAPY   SNOMED Code(s): 140764664


   





(13) History of permanent cardiac pacemaker placement


Current Visit: Yes   Status: Chronic   Code(s): Z95.0 - PRESENCE OF CARDIAC 

PACEMAKER   SNOMED Code(s): 266541702


   





(14) History of sick sinus syndrome


Current Visit: Yes   Status: Chronic   Code(s): Z86.79 - PERSONAL HISTORY OF 

OTHER DISEASES OF THE CIRCULATORY SYSTEM   SNOMED Code(s): 086561476036793


   





(15) Hypertension


Current Visit: Yes   Status: Chronic   Code(s): I10 - ESSENTIAL (PRIMARY) 

HYPERTENSION   SNOMED Code(s): 89992360


   





(16) On home oxygen therapy


Current Visit: Yes   Status: Chronic   Code(s): Z99.81 - DEPENDENCE ON 

SUPPLEMENTAL OXYGEN   SNOMED Code(s): 245476089931


   





(17) Peripheral vascular disease


Current Visit: Yes   Status: Chronic   Code(s): I73.9 - PERIPHERAL VASCULAR 

DISEASE, UNSPECIFIED   SNOMED Code(s): 445617514


   





(18) History of MRSA infection


Current Visit: No   Status: Resolved   Code(s): Z86.14 - PERSONAL HISTORY OF 

METHICILLIN RESIS STAPH INFECTION   SNOMED Code(s): 067725867


   





(19) History of smoking


Current Visit: No   Status: Resolved   Code(s): Z87.891 - PERSONAL HISTORY OF 

NICOTINE DEPENDENCE   SNOMED Code(s): 02356744175222067


   





(20) History of coronary artery bypass graft


Current Visit: Yes   Status: Chronic   Code(s): Z95.1 - PRESENCE OF 

AORTOCORONARY BYPASS GRAFT   SNOMED Code(s): 418520506


   


Plan: 





1.  Continue aspirin, statin, beta blocker.  Will increase beta blocker therapy 

as tolerated.


2.  Continue Coumadin.  Goal INR 2-2.5.


3.  Amiodarone therapy per cardiology.


4.  Wean O2 as tolerated.  Encourage incentive spirometry use 10 times every 

hour.


5.  Bronchodilators, steroids, antibiotics per Dr. Del Cid.


6.  Increase activity, ambulate as tolerated.  PT/OT following.


7.  GI/DVT prophylaxis.


8.  Insulin, other medical comorbidities per Dr. Griffiths.


9.  May discharge to subacute rehab from our standpoint when okay with other 

consultants.  Follow-up appointment made for patient to see Dr. Wolfe in the 

office at 3 months post cardiac surgery.








Time with Patient: Greater than 30
Subjective


Progress Note Date: 07/23/18


Principal diagnosis: 


Acute on chronic hypoxic respiratory failure secondary to healthcare acquired 

pneumonia, left pleural effusion, underlying COPD, chronic systolic congestive 

heart failure





This is a very pleasant 78-year-old gentleman who follows with Dr. Vang as 

his primary care physician.  He has a history of hypertension, osteomyelitis, 

peripheral vascular occlusive disease, diabetic neuropathy, diabetes mellitus 

type 2, chronic atrial fibrillation, chronic hypoxic respiratory failure with 

severe COPD, Gold stage III with an FEV1 value of 45% of predicted.  He follows 

in our office for the same.  He has a history of pseudomonas aeruginosa and 

oxacillin sensitive Staphylococcus aureus positive sputum cultures in June 2018.  He also has history of coronary artery disease and recently sustained a 

non-ST segment myocardial infarction requiring subsequent coronary artery 

bypass grafting utilizing a LIMA to the LAD, reverse saphenous vein graft to 

the OM1, OM 2, RCA.  He had been seen by Dr. Del Cid last week and follow-up in 

the office.  He was found to have a left pleural effusion measuring 10 cm with 

shortness of breath.  He was asked to hold his warfarin and the plan was for a 

left thoracentesis to be done in the outpatient setting today.  However, he 

presented to the emergency room approximately at 2:00 this morning from the 

[facility] with worsening shortness of breath and coughing up pink 

frothy sputum.  CT angiogram ruled out pulmonary embolism.  There was a noted 

consolidation involving the right lower lobe with subsegmental changes in the 

left lower lobe and endobronchial opacification suspicious for pneumonia.  

There is a left pleural effusion layering posteriorly with mild loculation.  He 

was seen and evaluated in the emergency room this morning with Dr. Del Cid.  He 

is awake and alert in mild respiratory distress.  He initially required 15L 

nonrebreather mask.  He is currently on 5 L/m per nasal cannula to maintain O2 

saturations in the 90s.  Afebrile.  He slightly tachycardic.  Atrial 

fibrillation.  White count 17.7.  Hemoglobin 13.3.  INR 1.3.  Creatinine 0.70.  

ProBNP 2480.  He has been initiated on Zosyn and Levaquin.





The patient is seen again today 07/18/2018 in follow-up on the [facility] 

unit.  He is currently sitting up in a chair at the bedside.  He is awake and 

alert in no acute distress.  He is breathing quite a bit better today as 

compared to yesterday.  He continues with a loose nonproductive cough.  He did 

undergo a left-sided thoracentesis this morning by interventional radiology in 

approximately 1.06 L of serosanguineous fluid was removed.  All of chest x-ray 

revealed no evidence complications from the thoracentesis.  There is a right 

lower lobe atelectasis.  He is working well with the incentive spirometer.  

Maintaining O2 saturations in the 90s on 3 L/m per nasal cannula.  

Hemodynamically stable.  Sputum culture pending.  Blood culture reveals no 

growth to date.  White count 12.9.  Hemoglobin 11.2.  INR 1.3.  Creatinine 

0.80. 





The patient is seen again today 07/19/2018 in follow-up on the selective care 

unit.  He is awake and alert in no acute distress.  He is breathing quite a bit 

easier today as compared to yesterday.  Nearly back to his baseline.  He 

sitting up in a chair at the bedside.  He denies any worsening shortness of 

breath, cough or congestion.  Today's chest x-ray shows near complete 

resolution of the pleural effusion status post thoracentesis on the left.  

There is some minimal blunting of left costophrenic angle.  Maintaining good O2 

saturations in the mid 90s on 3 L/m per nasal cannula.  He is afebrile.  White 

count 9.0.  Hemoglobin 10.0.  Creatinine 0.69.  He did have runs of 

nonsustained ventricular tachycardia requiring the initiation of amiodarone.





The patient is seen again today 07/20/2018 in follow-up on the selective care 

unit.  He's been up ambulating with assistance today.  He is doing quite well.  

He denies any worsening shortness of breath, cough or congestion.  He is 

maintaining O2 saturations up to 100% on 3 L/m per nasal cannula.  She's been 

afebrile.  Hemodynamically stable.  Chest x-ray shows improved aeration in the 

right lung base.  There is a small stable left pleural effusion.  Blood, sputum

, pleural fluid cultures are negative thus far.





Patient was seen today on 7/21/2018, remains on a monitor bed, feeling better, 

breathing easier, patient is already ambulating in the hallway, remains on 

antibiotics for pneumonia involving the right lower lobe, his left pleural 

effusion was drained, and felt much better since the drainage.  He is on 2-3 L 

nasal cannula.  Hemodynamically stable.  Follow-up chest x-ray showed 

improvement.  Patient will likely be discharged to [facility] early Monday.





Reevaluated today on 7/22/2018, patient is presently on the fifth floor, 

asymptomatic, no cough no wheezing no shortness of breath, continues to do well

, discharge planning is likely in progress to be discharged to a rehab facility 

in the morning.  Patient was admitted from a rehab facility.  His labs were 

reviewed, INR is therapeutic at 2.1.  CBC is relatively normal.  The pleural 

effusion which has been drained was basically transudate of in nature.





On 07/23/2018 patient seen in follow-up on medical surgical floor.  He sitting 

up in the chair, he distress, he has been ambulating about the room, tolerating 

it well.  He is currently on room air, on 2 L per nasal cannula his pulse ox 

was 99%,  no chest pain, no worsening dyspnea.  Denies any fever or chills, 

denies any chest wall tenderness.  His pleural fluid culture showed no growth, 

blood cultures have been negative, sputum cultures are negative.  Today's chest 

x-ray was reviewed, and showed bilateral consolidation and small effusions 

greater on the left.  Signs have been stable, he is afebrile.  He remains on 

Augmentin, is on oral diuretics, is on oral prednisone, and oral 

anticoagulation in the form of Coumadin.  For pulmonary standpoint patient is 

stable for discharge to the subacute rehab, follow-up with Dr. Del Cid any 

office in one week








Objective





- Vital Signs


Vital signs: 


 Vital Signs











Temp  97.5 F L  07/23/18 06:06


 


Pulse  76   07/23/18 08:50


 


Resp  18   07/23/18 06:06


 


BP  107/70   07/23/18 06:06


 


Pulse Ox  99   07/23/18 08:26








 Intake & Output











 07/22/18 07/23/18 07/23/18





 18:59 06:59 18:59


 


Other:   


 


  Voiding Method Toilet Toilet Toilet


 


  # Voids 1 1 


 


  # Bowel Movements 1  














- Exam


Physical Exam: Revealed a 78-year-old white male in no distress, very pleasant.


Head: Atraumatic, normocephalic.


HEENT:[Neck is supple.] [No neck masses.] [No thyromegaly.] [No JVD.]  No 

icterus, moist mucous membranes.


Chest: [Diminished breath sounds at the bases, no crackles, no rhonchi and no 

wheezes.  No chest wall tenderness.]


Cardiac Exam: [Irregular irregular rhythm.  Normal S1 and S2, no S3 gallop, no 

murmur.]


Abdomen: [Soft, nontender,  no megaly, no rebound, no guarding, normal bowel 

sounds.]


Extremities: [No clubbing, no edema, no cyanosis.]


Neurological Exam: [No focal neurologic deficit.


Psychiatric: Normal mood affect and mental status examination.


: No lymphadenopathy.]








- Labs


CBC & Chem 7: 


 07/21/18 05:59





 07/21/18 05:59


Labs: 


 Abnormal Lab Results - Last 24 Hours (Table)











  07/22/18 07/22/18 07/22/18 Range/Units





  12:12 17:27 20:04 


 


POC Glucose (mg/dL)  123 H  220 H  190 H  (75-99)  mg/dL














  07/23/18 07/23/18 Range/Units





  02:10 11:26 


 


POC Glucose (mg/dL)  128 H  164 H  (75-99)  mg/dL








 Microbiology - Last 24 Hours (Table)











 07/17/18 08:38 Blood Culture - Final





 Blood    No Growth after 144 hours


 


 07/17/18 08:42 Blood Culture - Final





 Blood    No Growth after 144 hours


 


 07/17/18 02:49 Blood Culture - Final





 Blood    No Growth after 144 hours


 


 07/18/18 10:35 Anaerobic Culture - Final





 Pleural Fluid 


 


 07/18/18 10:35 Gram Stain - Final





 Pleural Fluid Body Fluid Culture - Final














Assessment and Plan


Plan: 


Assessment:





#1 Acute on chronic hypoxic respiratory failure secondary to bilateral 

healthcare acquired pneumonia with left pleural effusion, COPD exacerbation and 

CHF exacerbation





#2 Left pleural effusion, Status post left thoracentesis today by 

interventional radiology.  Approximately 1.6 L of serosanguinous removed.  The 

pleural fluid was transudative in nature





#3 Acute exacerbation of oxygen dependent chronic obstructive pulmonary disease

, Gold stage III with FEV1 value of 45% of predicted.





#4 Recent pneumonia with positive sputum cultures of Pseudomonas and oxacillin 

sensitive Staphylococcus aureus from 06/11/2018.





#5 Acute exacerbation of chronic systolic congestive heart failure.  Mild to 

moderately impaired left ventricular systolic function with ejection fraction 40

-45%.





#6 Atrial fibrillation with varying ventricular response.  Warfarin resumed.





#7 Recent non-ST segment elevation myocardial infarction and subsequent 

coronary artery bypass grafting on 06/11/2018.  LIMA to the LAD, reverse this 

saphenous vein grafts to the first obtuse marginal artery, second obtuse 

marginal artery, right coronary artery.





#8 Diabetes mellitus, type II.





#9 Diabetic neuropathy.





#10 Peripheral vascular occlusive disease.  Multiple surgeries including 

palpitations of the fourth toe bilaterally.





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 

right second finger.





#12 Hypertension, history of.





#13 History of peptic ulcer disease with partial gastrectomy and colectomy. 





#14 ventricular tachycardia, initiated on amiodarone





#15 Poor overall functional performance based on the above-mentioned multiple 

comorbidities.





Recommendation: 





Patient remains stable from pulmonary standpoint, no acute no dyspnea, no chest 

pain, no signs are stable.  He's tolerating ambulation, he is on room air.  

Complete course of Augmentin and prednisone taper, all cultures are negative 

thus far, pleural fluid cultures showed no growth.  Anticipate discharge to 

subacute rehab today follow-up with Dr. Madrigal in the office in 7-10 days.





I performed a history & physical examination of the patient and discussed their 

management with my nurse practitioner, Keeley Self.  I reviewed the nurse 

practitioner's note and agree with the documented findings and plan of care.  

Lung sounds are clear.  The findings and the impression was discussed with the 

patient.  I attest to the documentation by the nurse practitioner. 





Time with Patient: Less than 30
Subjective


Progress Note Date: 07/24/18


Principal diagnosis: 





Admission for right lower lobe pneumonia.  Current left-sided pleural effusion.

  Previous medical history of triple-vessel coronary artery disease with left 

main disease and non-ST elevation myocardial infarction status post urgent 

quadruple coronary artery bypass grafting as well as exclusion of the left 

atrial appendage on 06/11/2018, mild to moderate left ventricular dysfunction, 

chronic systolic heart failure with ejection fraction of 40-45% as well as 

diastolic dysfunction, chronic persistent atrial fibrillation on home Coumadin 

therapy, sick sinus syndrome status post pacemaker insertion, hypertension, 

hyperlipidemia, carotid stenosis 50-79% bilaterally, previous tobacco dependence

, severe COPD with FEV1 45% of predicted, arthritis, BPH, GERD, recurrent 

extremity osteomyelitis with MRSA requiring toe and finger amputations, type 2 

diabetes mellitus with previous hemoglobin A1c 5.9%, peripheral vascular disease

, pneumonia, family history of early onset coronary artery disease, and 

previous sputum culture positive for pseudomonas and MSSA.





POD #6 left-sided ultrasound guided thoracentesis performed by interventional 

radiology with approximately 1 L serosanguineous fluid removal.





Patient's currently sitting up in the chair in no acute distress.  States his 

symptoms have improved significantly and he feels ready to go back to rehab, 

just waiting on a bed.





Objective





- Vital Signs


Vital signs: 


 Vital Signs











Temp  97 F L  07/24/18 06:18


 


Pulse  84   07/24/18 11:23


 


Resp  18   07/24/18 11:23


 


BP  116/62   07/24/18 06:18


 


Pulse Ox  97   07/24/18 06:18








 Intake & Output











 07/23/18 07/24/18 07/24/18





 18:59 06:59 18:59


 


Intake Total  540 240


 


Output Total 2  


 


Balance -2 540 240


 


Intake:   


 


  Oral  540 240


 


Output:   


 


  Stool 2  


 


Other:   


 


  Voiding Method Toilet Toilet Toilet


 


  # Voids 2 2 














- Constitutional


General appearance: Present: cooperative, no acute distress





- Respiratory


Details: 





Lungs sounds diminished on the left.  Respirations even, nonlabored.  Currently 

on 2 L nasal cannula with oxygen saturation 97%.  Able to achieve 1750 mL on 

his incentive spirometry.  Effective productive cough with tan sputum.





- Cardiovascular


Details: 





S1, S2 present.  Irregular rate and rhythm, atrial fibrillation on telemetry.  

Sternum stable.  Palpable peripheral pulses bilaterally.  No edema present.  No 

calf pain or tenderness noted.  SCDs present.





- Gastrointestinal


Gastrointestinal Comment(s): 





Abdomen soft, nontender, nondistended.  Active bowel sounds 4 quadrants.  

Tolerating diet.  Positive bowel movement








- Genitourinary


Genitourinary Comment(s): 





Continues to void clear, yellow urine.








- Integumentary


Integumentary Comment(s): 





Skin is warm and dry.  Anterior chest incision well healed.  Right lower 

extremity EVH site open, scant serosanguineous drainage, covered with dry 

intact dressing. 





- Neurologic


Neurologic: Present: CNII-XII intact





- Musculoskeletal


Musculoskeletal: Present: gait normal, strength equal bilaterally





- Psychiatric


Psychiatric: Present: A&O x's 3, appropriate affect, intact judgment & insight





- Allied health notes


Allied health notes reviewed: nursing





- Labs


CBC & Chem 7: 


 07/21/18 05:59





 07/21/18 05:59


Labs: 


 Abnormal Lab Results - Last 24 Hours (Table)











  07/23/18 07/23/18 07/23/18 Range/Units





  16:15 16:59 20:11 


 


PT  24.1 H    (9.0-12.0)  sec


 


INR  2.7 H    (<1.2)  


 


POC Glucose (mg/dL)   270 H  192 H  (75-99)  mg/dL














  07/24/18 07/24/18 Range/Units





  04:21 11:25 


 


PT    (9.0-12.0)  sec


 


INR    (<1.2)  


 


POC Glucose (mg/dL)  138 H  136 H  (75-99)  mg/dL








 Microbiology - Last 24 Hours (Table)











 07/17/18 08:38 Blood Culture - Final





 Blood    No Growth after 144 hours


 


 07/17/18 08:42 Blood Culture - Final





 Blood    No Growth after 144 hours














Assessment and Plan


(1) Chronic systolic heart failure


Current Visit: Yes   Status: Chronic   Code(s): I50.22 - CHRONIC SYSTOLIC (

CONGESTIVE) HEART FAILURE   SNOMED Code(s): 117524076


   





(2) Pleural effusion


Current Visit: Yes   Status: Acute   Code(s): J90 - PLEURAL EFFUSION, NOT 

ELSEWHERE CLASSIFIED   SNOMED Code(s): 18890419


   





(3) Pneumonia


Current Visit: Yes   Status: Acute   Code(s): J18.9 - PNEUMONIA, UNSPECIFIED 

ORGANISM   SNOMED Code(s): 463230498


   





(4) Non-ST elevated myocardial infarction


Current Visit: No   Status: Resolved   Code(s): I21.4 - NON-ST ELEVATION (NSTEMI

) MYOCARDIAL INFARCTION   SNOMED Code(s): 392805402


   





(5) BPH (benign prostatic hyperplasia)


Current Visit: Yes   Status: Chronic   Code(s): N40.0 - BENIGN PROSTATIC 

HYPERPLASIA WITHOUT LOWER URINRY TRACT SYMP   SNOMED Code(s): 135832795


   





(6) CAD (coronary artery disease)


Current Visit: Yes   Status: Chronic   Code(s): I25.10 - ATHSCL HEART DISEASE 

OF NATIVE CORONARY ARTERY W/O ANG PCTRS   SNOMED Code(s): 67118815


   





(7) COPD (chronic obstructive pulmonary disease)


Current Visit: Yes   Status: Chronic   Code(s): J44.9 - CHRONIC OBSTRUCTIVE 

PULMONARY DISEASE, UNSPECIFIED   SNOMED Code(s): 95884573


   





(8) Chronic atrial fibrillation


Current Visit: Yes   Status: Chronic   Code(s): I48.2 - CHRONIC ATRIAL 

FIBRILLATION   SNOMED Code(s): 518114648


   





(9) Diabetes mellitus type 2 in nonobese


Current Visit: Yes   Status: Chronic   Code(s): E11.9 - TYPE 2 DIABETES 

MELLITUS WITHOUT COMPLICATIONS   SNOMED Code(s): 347619677


   





(10) Family history of early CAD


Current Visit: Yes   Status: Chronic   Code(s): Z82.49 - FAMILY HX OF ISCHEM 

HEART DIS AND OTH DIS OF THE CIRC SYS   SNOMED Code(s): 861153132


   





(11) GERD (gastroesophageal reflux disease)


Current Visit: Yes   Status: Chronic   Code(s): K21.9 - GASTRO-ESOPHAGEAL 

REFLUX DISEASE WITHOUT ESOPHAGITIS   SNOMED Code(s): 119680351


   





(12) History of Coumadin therapy


Current Visit: Yes   Status: Chronic   Code(s): Z92.29 - PERSONAL HISTORY OF 

OTHER DRUG THERAPY   SNOMED Code(s): 681059522


   





(13) History of permanent cardiac pacemaker placement


Current Visit: Yes   Status: Chronic   Code(s): Z95.0 - PRESENCE OF CARDIAC 

PACEMAKER   SNOMED Code(s): 652766978


   





(14) History of sick sinus syndrome


Current Visit: Yes   Status: Chronic   Code(s): Z86.79 - PERSONAL HISTORY OF 

OTHER DISEASES OF THE CIRCULATORY SYSTEM   SNOMED Code(s): 020284867644320


   





(15) Hypertension


Current Visit: Yes   Status: Chronic   Code(s): I10 - ESSENTIAL (PRIMARY) 

HYPERTENSION   SNOMED Code(s): 96145854


   





(16) On home oxygen therapy


Current Visit: Yes   Status: Chronic   Code(s): Z99.81 - DEPENDENCE ON 

SUPPLEMENTAL OXYGEN   SNOMED Code(s): 994053895225


   





(17) Peripheral vascular disease


Current Visit: Yes   Status: Chronic   Code(s): I73.9 - PERIPHERAL VASCULAR 

DISEASE, UNSPECIFIED   SNOMED Code(s): 399701802


   





(18) History of MRSA infection


Current Visit: No   Status: Resolved   Code(s): Z86.14 - PERSONAL HISTORY OF 

METHICILLIN RESIS STAPH INFECTION   SNOMED Code(s): 118175121


   





(19) History of smoking


Current Visit: No   Status: Resolved   Code(s): Z87.891 - PERSONAL HISTORY OF 

NICOTINE DEPENDENCE   SNOMED Code(s): 73368896357266751


   





(20) History of coronary artery bypass graft


Current Visit: Yes   Status: Chronic   Code(s): Z95.1 - PRESENCE OF 

AORTOCORONARY BYPASS GRAFT   SNOMED Code(s): 480688417


   


Plan: 





1.  Continue aspirin, statin, beta blocker.  Will increase beta blocker therapy 

as tolerated.


2.  Continue Coumadin.  Goal INR 2-2.5.


3.  Amiodarone therapy per cardiology.


4.  Wean O2 as tolerated.  Encourage incentive spirometry use 10 times every 

hour.


5.  Bronchodilators, steroids, antibiotics per Dr. Denson.


6.  Increase activity, ambulate as tolerated.  PT/OT following.


7.  GI/DVT prophylaxis.


8.  Insulin, other medical comorbidities per Dr. Griffiths.


9.  May discharge to subacute rehab from our standpoint when okay with other 

consultants.  Follow-up appointment made for patient to see Dr. Wolfe in the 

office at 3 months post cardiac surgery.








Time with Patient: Greater than 30
Subjective


Progress Note Date: 07/24/18


Principal diagnosis: 


Acute on chronic hypoxic respiratory failure secondary to healthcare acquired 

pneumonia, left pleural effusion, underlying COPD, chronic systolic congestive 

heart failure





This is a very pleasant 78-year-old gentleman who follows with Dr. Vang as 

his primary care physician.  He has a history of hypertension, osteomyelitis, 

peripheral vascular occlusive disease, diabetic neuropathy, diabetes mellitus 

type 2, chronic atrial fibrillation, chronic hypoxic respiratory failure with 

severe COPD, Gold stage III with an FEV1 value of 45% of predicted.  He follows 

in our office for the same.  He has a history of pseudomonas aeruginosa and 

oxacillin sensitive Staphylococcus aureus positive sputum cultures in June 2018.  He also has history of coronary artery disease and recently sustained a 

non-ST segment myocardial infarction requiring subsequent coronary artery 

bypass grafting utilizing a LIMA to the LAD, reverse saphenous vein graft to 

the OM1, OM 2, RCA.  He had been seen by Dr. Del Cid last week and follow-up in 

the office.  He was found to have a left pleural effusion measuring 10 cm with 

shortness of breath.  He was asked to hold his warfarin and the plan was for a 

left thoracentesis to be done in the outpatient setting today.  However, he 

presented to the emergency room approximately at 2:00 this morning from the 

[facility] with worsening shortness of breath and coughing up pink 

frothy sputum.  CT angiogram ruled out pulmonary embolism.  There was a noted 

consolidation involving the right lower lobe with subsegmental changes in the 

left lower lobe and endobronchial opacification suspicious for pneumonia.  

There is a left pleural effusion layering posteriorly with mild loculation.  He 

was seen and evaluated in the emergency room this morning with Dr. Del Cid.  He 

is awake and alert in mild respiratory distress.  He initially required 15L 

nonrebreather mask.  He is currently on 5 L/m per nasal cannula to maintain O2 

saturations in the 90s.  Afebrile.  He slightly tachycardic.  Atrial 

fibrillation.  White count 17.7.  Hemoglobin 13.3.  INR 1.3.  Creatinine 0.70.  

ProBNP 2480.  He has been initiated on Zosyn and Levaquin.





The patient is seen again today 07/18/2018 in follow-up on the [facility] 

unit.  He is currently sitting up in a chair at the bedside.  He is awake and 

alert in no acute distress.  He is breathing quite a bit better today as 

compared to yesterday.  He continues with a loose nonproductive cough.  He did 

undergo a left-sided thoracentesis this morning by interventional radiology in 

approximately 1.06 L of serosanguineous fluid was removed.  All of chest x-ray 

revealed no evidence complications from the thoracentesis.  There is a right 

lower lobe atelectasis.  He is working well with the incentive spirometer.  

Maintaining O2 saturations in the 90s on 3 L/m per nasal cannula.  

Hemodynamically stable.  Sputum culture pending.  Blood culture reveals no 

growth to date.  White count 12.9.  Hemoglobin 11.2.  INR 1.3.  Creatinine 

0.80. 





The patient is seen again today 07/19/2018 in follow-up on the selective care 

unit.  He is awake and alert in no acute distress.  He is breathing quite a bit 

easier today as compared to yesterday.  Nearly back to his baseline.  He 

sitting up in a chair at the bedside.  He denies any worsening shortness of 

breath, cough or congestion.  Today's chest x-ray shows near complete 

resolution of the pleural effusion status post thoracentesis on the left.  

There is some minimal blunting of left costophrenic angle.  Maintaining good O2 

saturations in the mid 90s on 3 L/m per nasal cannula.  He is afebrile.  White 

count 9.0.  Hemoglobin 10.0.  Creatinine 0.69.  He did have runs of 

nonsustained ventricular tachycardia requiring the initiation of amiodarone.





The patient is seen again today 07/20/2018 in follow-up on the selective care 

unit.  He's been up ambulating with assistance today.  He is doing quite well.  

He denies any worsening shortness of breath, cough or congestion.  He is 

maintaining O2 saturations up to 100% on 3 L/m per nasal cannula.  She's been 

afebrile.  Hemodynamically stable.  Chest x-ray shows improved aeration in the 

right lung base.  There is a small stable left pleural effusion.  Blood, sputum

, pleural fluid cultures are negative thus far.





Patient was seen today on 7/21/2018, remains on a monitor bed, feeling better, 

breathing easier, patient is already ambulating in the hallway, remains on 

antibiotics for pneumonia involving the right lower lobe, his left pleural 

effusion was drained, and felt much better since the drainage.  He is on 2-3 L 

nasal cannula.  Hemodynamically stable.  Follow-up chest x-ray showed 

improvement.  Patient will likely be discharged to [facility] early Monday.





Reevaluated today on 7/22/2018, patient is presently on the fifth floor, 

asymptomatic, no cough no wheezing no shortness of breath, continues to do well

, discharge planning is likely in progress to be discharged to a rehab facility 

in the morning.  Patient was admitted from a rehab facility.  His labs were 

reviewed, INR is therapeutic at 2.1.  CBC is relatively normal.  The pleural 

effusion which has been drained was basically transudate of in nature.





On 07/23/2018 patient seen in follow-up on medical surgical floor.  He sitting 

up in the chair, he distress, he has been ambulating about the room, tolerating 

it well.  He is currently on room air, on 2 L per nasal cannula his pulse ox 

was 99%,  no chest pain, no worsening dyspnea.  Denies any fever or chills, 

denies any chest wall tenderness.  His pleural fluid culture showed no growth, 

blood cultures have been negative, sputum cultures are negative.  Today's chest 

x-ray was reviewed, and showed bilateral consolidation and small effusions 

greater on the left.  Signs have been stable, he is afebrile.  He remains on 

Augmentin, is on oral diuretics, is on oral prednisone, and oral 

anticoagulation in the form of Coumadin.  For pulmonary standpoint patient is 

stable for discharge to the subacute rehab, follow-up with Dr. Del Cid any 

office in one week





On 07/24/2018 patient is seen in follow-up on medical surgical floor.  Remains 

stable, no specific complaints, no dyspnea, no chest pain.  He is on room air, 

he has been wearing her oxygen intermittently, on 2 L per nasal cannula his 

pulse ox is 97%, tolerating ambulation, lung sounds are clear, diminished at 

the bases, no rhonchi no wheezing noted.  No new labs or chest x-rays, no fever 

or chills, patient is awaiting previous surgery for placement to [facility] rehab.

  We will sign off at this time, and continue with the patient on as-needed 

basis.  Patient is to follow-up with Dr. Madrigal in one week








Objective





- Vital Signs


Vital signs: 


 Vital Signs











Temp  97 F L  07/24/18 06:18


 


Pulse  80   07/24/18 07:34


 


Resp  16   07/24/18 06:18


 


BP  116/62   07/24/18 06:18


 


Pulse Ox  97   07/24/18 06:18








 Intake & Output











 07/23/18 07/24/18 07/24/18





 18:59 06:59 18:59


 


Intake Total  540 240


 


Output Total 2  


 


Balance -2 540 240


 


Intake:   


 


  Oral  540 240


 


Output:   


 


  Stool 2  


 


Other:   


 


  Voiding Method Toilet Toilet Toilet


 


  # Voids 2 2 














- Exam


Physical Exam: Revealed a 78-year-old white male in no distress, very pleasant.


Head: Atraumatic, normocephalic.


HEENT:[Neck is supple.] [No neck masses.] [No thyromegaly.] [No JVD.]  No 

icterus, moist mucous membranes.


Chest: [Diminished breath sounds at the bases, no crackles, no rhonchi and no 

wheezes.  No chest wall tenderness.]


Cardiac Exam: [Irregular irregular rhythm.  Normal S1 and S2, no S3 gallop, no 

murmur.]


Abdomen: [Soft, nontender,  no megaly, no rebound, no guarding, normal bowel 

sounds.]


Extremities: [No clubbing, no edema, no cyanosis.]


Neurological Exam: [No focal neurologic deficit.


Psychiatric: Normal mood affect and mental status examination.


: No lymphadenopathy.]








- Labs


CBC & Chem 7: 


 07/21/18 05:59





 07/21/18 05:59


Labs: 


 Abnormal Lab Results - Last 24 Hours (Table)











  07/23/18 07/23/18 07/23/18 Range/Units





  11:26 16:15 16:59 


 


PT   24.1 H   (9.0-12.0)  sec


 


INR   2.7 H   (<1.2)  


 


POC Glucose (mg/dL)  164 H   270 H  (75-99)  mg/dL














  07/23/18 07/24/18 Range/Units





  20:11 04:21 


 


PT    (9.0-12.0)  sec


 


INR    (<1.2)  


 


POC Glucose (mg/dL)  192 H  138 H  (75-99)  mg/dL








 Microbiology - Last 24 Hours (Table)











 07/17/18 08:38 Blood Culture - Final





 Blood    No Growth after 144 hours


 


 07/17/18 08:42 Blood Culture - Final





 Blood    No Growth after 144 hours














Assessment and Plan


Plan: 


Assessment:





#1 Acute on chronic hypoxic respiratory failure secondary to bilateral 

healthcare acquired pneumonia with left pleural effusion, COPD exacerbation and 

CHF exacerbation





#2 Left pleural effusion, Status post left thoracentesis today by 

interventional radiology.  Approximately 1.6 L of serosanguinous removed.  The 

pleural fluid was transudative in nature





#3 Acute exacerbation of oxygen dependent chronic obstructive pulmonary disease

, Gold stage III with FEV1 value of 45% of predicted.





#4 Recent pneumonia with positive sputum cultures of Pseudomonas and oxacillin 

sensitive Staphylococcus aureus from 06/11/2018.





#5 Acute exacerbation of chronic systolic congestive heart failure.  Mild to 

moderately impaired left ventricular systolic function with ejection fraction 40

-45%.





#6 Atrial fibrillation with varying ventricular response.  Warfarin resumed.





#7 Recent non-ST segment elevation myocardial infarction and subsequent 

coronary artery bypass grafting on 06/11/2018.  LIMA to the LAD, reverse this 

saphenous vein grafts to the first obtuse marginal artery, second obtuse 

marginal artery, right coronary artery.





#8 Diabetes mellitus, type II.





#9 Diabetic neuropathy.





#10 Peripheral vascular occlusive disease.  Multiple surgeries including 

palpitations of the fourth toe bilaterally.





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 

right second finger.





#12 Hypertension, history of.





#13 History of peptic ulcer disease with partial gastrectomy and colectomy. 





#14 ventricular tachycardia, initiated on amiodarone





#15 Poor overall functional performance based on the above-mentioned multiple 

comorbidities.





Recommendation: 





Patient remains stable, no specific complaints, no dyspnea, no chest pain, 

tolerating ambulation.  From pulmonary standpoint stable for discharge to 

[facility] rehab as precertification goes through.  We will follow with him on as-

needed basis.  Follow-up with Dr. Madrigal in the office in 7-10 days.





I performed a history & physical examination of the patient and discussed their 

management with my nurse practitioner, Keeley Self.  I reviewed the nurse 

practitioner's note and agree with the documented findings and plan of care.  

Lung sounds are clear.  The findings and the impression was discussed with the 

patient.  I attest to the documentation by the nurse practitioner. 





Time with Patient: Less than 30
No bruits; no thyromegaly or nodules

## 2019-09-27 NOTE — P.PN
Patient:   ERA ZARAGOZA            MRN: [id number]            FIN: [id number]               Age:   89 years     Sex:  MALE     :  22   Associated Diagnoses:   None   Author:   ALMA ROSA ENRIQUEZ Hospitalist        ADMIT DATE: 12/15/2017    DISCHARGE DATE: 2017     PCP: Dr. Cortez Fajardo  DATE PCP NOTIFIED: 2017   METHOD OF NOTIFICATION: Perfect serve    CONSULTING PHYSICIAN(s): None    CONDITION ON DISCHARGE: Stable     DISCHARGE DIAGNOSIS:    acute onset of vomiting watery BMs.  Now resolved  Coronary artery disease status post CABG and hyperlipidemia  Permanent atrial fib  AK is likely due to prerenal azotemia and diarrhea secondary to above on possible chronic kidney disease, stage unknown  Hypomagnesemia.  Suspected secondary diarrhea and decreased oral intake  Polymyalgia rheumatica  Hypothyroidism  Unsteady gait likely due to dehydration from above  Suspected history of BPH  Normocytic anemia suspected secondary to chronic kidney disease versus secondary to PMR  Leukocytosis suspect secondary to above  Left lower shin lesion suspect SCC or BCC status post a can biopsy 2 weeks ago    CODE STATUS: Full code    PROCEDURES PERFORMED DURING ADMISSION AND DATES PERFORMED: None    HOSPITAL COURSE  This is a 89-year-old male, a patient of Dr. Cortez Fajardo, with a past medical history of permanent atrial fibrillation, CAD s/p CABG, hyperlipidemia, ANTIONETTE, not compliant with CPAP, suspected BPH, hypothroidism, PMR, previous C. difficile infection, who presented with multiple watery BMs, nausea vomiting and chills.    The patient states that he had a tooth extracted from his lower jaw a few days ago. He does not recall being on any abx after the tooth extraction. He states that he was prescribed codeine or Tylenol with codeine, but did not take it.     The day before admission, the patient developed multiple watery BMs, nausea, vomiting, and chills but no fevers. He had no other  <Katharine Low - Last Filed: 06/18/18 14:35>





Subjective


Progress Note Date: 06/18/18


Principal diagnosis: 





Symptomatic triple-vessel coronary artery disease with left main disease, non-

ST elevation myocardial infarction, mild to moderate left ventricular 

dysfunction.  Chronic persistent atrial fibrillation on home Coumadin therapy.  

Sick sinus syndrome, status post permanent pacemaker insertion.  Chronic 

systolic heart failure with EF 40-45% and diastolic dysfunction.  Hypertension.

  Hyperlipidemia. Bilateral carotid stenosis, 50-79% bilaterally, Previous 

tobacco dependence.  Severe chronic obstructive pulmonary disease with home 

oxygen with preoperative FEV1 45% of predicted.  Arthritis.  Benign prostatic 

hypertrophy.  Gastroesophageal reflux disease.  Recurrent extremities 

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  Lumbar disc 

disease with chronic pain syndrome.  History of deep vein thrombosis of his 

left leg.  Type 2 diabetes mellitus with preoperative hemoglobin A1c 5.9%.  

Peripheral vascular disease.  Remote history of pneumonia.  Family history of 

early onset coronary artery disease with brother being diagnosed with 

myocardial infarction at age 46.  Preoperative nasal swab positive for MSSA.  

Preoperative sputum culture positive for pseudomonas and MSSA.





POD #7 urgent quadruple coronary artery bypass grafting using the left internal 

mammary artery to the left anterior descending artery, reverse saphenous vein 

graft from the aorta to the first obtuse marginal artery, reverse saphenous 

vein graft from the aorta to the second obtuse marginal artery, reverse 

saphenous vein graft from the aorta to the right coronary artery.  Exclusion of 

the left atrial appendage using a 40 mm AtriClip.  Endoscopic harvesting of the 

right greater saphenous vein.  Intraoperative transesophageal echocardiogram 

and epi-aortic scanning.  Intraoperative graft flow measurements using the 

Medistim system.





Postoperative normocytic, normochromic anemia, an expected outcome of surgery 

related to hemodilution and cardiopulmonary bypass pump.





Heparin-induced thrombocytopenia, an unexpected but potential outcome of IV 

heparin use.





Patient's currently sitting in chair in no acute distress.  Denies pain, does 

complain of mild shortness of breath every morning but does state that it gets 

better throughout the day.  The patient states he slept well last night, no new 

complaints.





Objective





- Vital Signs


Vital signs: 


 Vital Signs











Temp  97.5 F L  06/18/18 03:34


 


Pulse  76   06/18/18 07:49


 


Resp  19   06/18/18 03:34


 


BP  96/57   06/18/18 03:34


 


Pulse Ox  93 L  06/18/18 07:30








 Intake & Output











 06/17/18 06/18/18 06/18/18





 18:59 06:59 18:59


 


Intake Total 1010  


 


Output Total 600 250 


 


Balance 410 -250 


 


Weight  76.8 kg 


 


Intake:   


 


  Oral 1010  


 


Output:   


 


  Urine 600 250 


 


Other:   


 


  # Voids 1 1 


 


  # Bowel Movements 1  








 ABP, PAP, CO, CI - Last Documented











Arterial Blood Pressure        133/59


 


Pulmonary Artery Pressure      25/8


 


Cardiac Output                 5.5


 


Cardiac Index                  3.0

















- Constitutional


General appearance: Present: cooperative, no acute distress





- Respiratory


Details: 





Lungs sounds diminished bilaterally with scattered rhonchi.  Respirations even, 

nonlabored.  Currently on 3 L nasal cannula with oxygen saturation 98%.  Able 

to achieve 1615-1215 mL on his incentive spirometry.  Productive cough with 

yellow sputum.








- Cardiovascular


Details: 





S1, S2 present.  Irregular rate and rhythm, atrial fibrillation on telemetry.   

Sternum stable.  Palpable peripheral pulses bilaterally.  Trace lower extremity 

edema present.  No calf pain or tenderness noted.   Heart hugger in place with 

patient demonstrating appropriate use.  Antiembolism stockings, SCDs present.








- Gastrointestinal


Gastrointestinal Comment(s): 





Abdomen soft, nontender, nondistended.  Active bowel sounds 4 quadrants.  

Tolerating diet.  Positive bowel movement.





- Genitourinary


Genitourinary Comment(s): 





Continues to void clear, yellow urine.








- Integumentary


Integumentary Comment(s): 





Skin is warm and dry.  Anterior chest incision well approximated and covered 

with dry intact dressing.  Right lower extremity EVH site well approximated 

with dry dressing in place.








- Neurologic


Neurologic: Present: CNII-XII intact





- Musculoskeletal


Musculoskeletal: Present: generalized weakness, strength equal bilaterally





- Psychiatric


Psychiatric: Present: A&O x's 3, appropriate affect, intact judgment & insight





- Allied health notes


Allied health notes reviewed: nursing





- Labs


CBC & Chem 7: 


 06/18/18 05:42





 06/18/18 05:42


Labs: 


 Abnormal Lab Results - Last 24 Hours (Table)











  06/17/18 06/17/18 06/17/18 Range/Units





  11:30 16:32 21:11 


 


WBC     (3.8-10.6)  k/uL


 


RBC     (4.30-5.90)  m/uL


 


Hgb     (13.0-17.5)  gm/dL


 


Hct     (39.0-53.0)  %


 


RDW     (11.5-15.5)  %


 


PT     (9.0-12.0)  sec


 


INR     (<1.2)  


 


Sodium     (137-145)  mmol/L


 


Chloride     ()  mmol/L


 


Carbon Dioxide     (22-30)  mmol/L


 


BUN     (9-20)  mg/dL


 


POC Glucose (mg/dL)  116 H  212 H  176 H  (75-99)  mg/dL


 


Calcium     (8.4-10.2)  mg/dL


 


Total Protein     (6.3-8.2)  g/dL


 


Albumin     (3.5-5.0)  g/dL














  06/18/18 06/18/18 06/18/18 Range/Units





  02:05 05:42 05:42 


 


WBC   15.0 H   (3.8-10.6)  k/uL


 


RBC   2.93 L   (4.30-5.90)  m/uL


 


Hgb   8.5 L   (13.0-17.5)  gm/dL


 


Hct   26.4 L   (39.0-53.0)  %


 


RDW   18.8 H   (11.5-15.5)  %


 


PT     (9.0-12.0)  sec


 


INR     (<1.2)  


 


Sodium    134 L  (137-145)  mmol/L


 


Chloride    97 L  ()  mmol/L


 


Carbon Dioxide    32 H  (22-30)  mmol/L


 


BUN    21 H  (9-20)  mg/dL


 


POC Glucose (mg/dL)  153 H    (75-99)  mg/dL


 


Calcium    8.2 L  (8.4-10.2)  mg/dL


 


Total Protein    5.1 L  (6.3-8.2)  g/dL


 


Albumin    3.1 L  (3.5-5.0)  g/dL














  06/18/18 06/18/18 Range/Units





  05:42 05:47 


 


WBC    (3.8-10.6)  k/uL


 


RBC    (4.30-5.90)  m/uL


 


Hgb    (13.0-17.5)  gm/dL


 


Hct    (39.0-53.0)  %


 


RDW    (11.5-15.5)  %


 


PT  12.9 H   (9.0-12.0)  sec


 


INR  1.4 H   (<1.2)  


 


Sodium    (137-145)  mmol/L


 


Chloride    ()  mmol/L


 


Carbon Dioxide    (22-30)  mmol/L


 


BUN    (9-20)  mg/dL


 


POC Glucose (mg/dL)   105 H  (75-99)  mg/dL


 


Calcium    (8.4-10.2)  mg/dL


 


Total Protein    (6.3-8.2)  g/dL


 


Albumin    (3.5-5.0)  g/dL














- Imaging and Cardiology


Chest x-ray: image reviewed





Assessment and Plan


(1) Family history of early CAD


Current Visit: Yes   Status: Chronic   Code(s): Z82.49 - FAMILY HX OF ISCHEM 

HEART DIS AND OTH DIS OF THE CIRC SYS   SNOMED Code(s): 019414141


   





(2) On home oxygen therapy


Current Visit: Yes   Status: Chronic   Code(s): Z99.81 - DEPENDENCE ON 

SUPPLEMENTAL OXYGEN   SNOMED Code(s): 873911154727


   





(3) BPH (benign prostatic hyperplasia)


Current Visit: Yes   Status: Chronic   Code(s): N40.0 - BENIGN PROSTATIC 

HYPERPLASIA WITHOUT LOWER URINRY TRACT SYMP   SNOMED Code(s): 902570530


   





(4) CAD (coronary artery disease)


Current Visit: Yes   Status: Chronic   Code(s): I25.10 - ATHSCL HEART DISEASE 

OF NATIVE CORONARY ARTERY W/O ANG PCTRS   SNOMED Code(s): 94230883


   





(5) COPD (chronic obstructive pulmonary disease)


Current Visit: Yes   Status: Chronic   Code(s): J44.9 - CHRONIC OBSTRUCTIVE 

PULMONARY DISEASE, UNSPECIFIED   SNOMED Code(s): 61550875


   





(6) Chronic atrial fibrillation


Current Visit: Yes   Status: Chronic   Code(s): I48.2 - CHRONIC ATRIAL 

FIBRILLATION   SNOMED Code(s): 861394891


   





(7) Chronic diastolic CHF (congestive heart failure)


Current Visit: Yes   Status: Chronic   Code(s): I50.32 - CHRONIC DIASTOLIC (

CONGESTIVE) HEART FAILURE   SNOMED Code(s): 013303376


   





(8) Chronic pain syndrome


Current Visit: Yes   Status: Chronic   Code(s): G89.4 - CHRONIC PAIN SYNDROME   

SNOMED Code(s): 249334338


   





(9) Diabetes mellitus type 2 in nonobese


Current Visit: Yes   Status: Chronic   Code(s): E11.9 - TYPE 2 DIABETES 

MELLITUS WITHOUT COMPLICATIONS   SNOMED Code(s): 600976716


   





(10) GERD (gastroesophageal reflux disease)


Current Visit: Yes   Status: Chronic   Code(s): K21.9 - GASTRO-ESOPHAGEAL 

REFLUX DISEASE WITHOUT ESOPHAGITIS   SNOMED Code(s): 338025832


   





(11) History of Coumadin therapy


Current Visit: Yes   Status: Chronic   Code(s): Z92.29 - PERSONAL HISTORY OF 

OTHER DRUG THERAPY   SNOMED Code(s): 023846718


   





(12) History of MRSA infection


Current Visit: No   Status: Resolved   Code(s): Z86.14 - PERSONAL HISTORY OF 

METHICILLIN RESIS STAPH INFECTION   SNOMED Code(s): 832398404


   





(13) History of permanent cardiac pacemaker placement


Current Visit: Yes   Status: Chronic   Code(s): Z95.0 - PRESENCE OF CARDIAC 

PACEMAKER   SNOMED Code(s): 296195243


   





(14) History of sick sinus syndrome


Current Visit: Yes   Status: Chronic   Code(s): Z86.79 - PERSONAL HISTORY OF 

OTHER DISEASES OF THE CIRCULATORY SYSTEM   SNOMED Code(s): 339929360970986


   





(15) History of smoking


Current Visit: No   Status: Resolved   Code(s): Z87.891 - PERSONAL HISTORY OF 

NICOTINE DEPENDENCE   SNOMED Code(s): 90203088780167346


   





(16) Hypertension


Current Visit: Yes   Status: Chronic   Code(s): I10 - ESSENTIAL (PRIMARY) 

HYPERTENSION   SNOMED Code(s): 99148106


   





(17) Lumbar disc disease


Current Visit: Yes   Status: Chronic   Code(s): M51.9 - UNSP THORACIC, 

THORACOLUM AND LUMBOSACR INTVRT DISC DISORDER   SNOMED Code(s): 863859874


   





(18) Non-ST elevated myocardial infarction


Current Visit: Yes   Status: Acute   Code(s): I21.4 - NON-ST ELEVATION (NSTEMI) 

MYOCARDIAL INFARCTION   SNOMED Code(s): 277519339


   





(19) Peripheral vascular disease


Current Visit: Yes   Status: Chronic   Code(s): I73.9 - PERIPHERAL VASCULAR 

DISEASE, UNSPECIFIED   SNOMED Code(s): 812047588


   


Plan: 





1.  Continue aspirin, statin, Plavix, Arixtra, ACE inhibitor, Toprol XL.  


2.  Will give 5 mg Coumadin today.  Daily INRs.  Goal INR 2-2.5.  Will stop 

Arixtra and Plavix once INR is therapeutic.


3.  Wean O2 as tolerated.  Encourage incentive spirometry use 10 times every 

hour while awake.


4.  Encourage continued smoking cessation.


5.  Bronchodilators, steroids, antibiotics per pulmonology.  


6.  Increase activity, ambulate in hallway.  PT/OT/chronic rehab following.


7.  Will monitor daily labs and x-rays.


8.  Insulin management per Dr. Vang.


9.  Pain management with current medication regimen.


10.  GI/DVT prophylaxis.


11.  Reorient PRN.


12.  Discharge planning in progress.  Possible discharge to [facility] today.  Plan 

was for [facility], patient would prefer [facility].  Will discuss with case 

management/social work.


13.  More recommendations to follow.


Time with Patient: Greater than 30





<Dhruv Anton - Last Filed: 06/18/18 14:44>





Objective





- Vital Signs


Vital signs: 


 Vital Signs











Temp  97.8 F   06/18/18 07:54


 


Pulse  72   06/18/18 11:25


 


Resp  19   06/18/18 07:54


 


BP  103/59   06/18/18 09:17


 


Pulse Ox  100   06/18/18 09:17








 Intake & Output











 06/17/18 06/18/18 06/18/18





 18:59 06:59 18:59


 


Intake Total 1010  660


 


Output Total 600 250 451


 


Balance 410 -250 209


 


Weight  76.8 kg 


 


Intake:   


 


  Oral 1010  660


 


Output:   


 


  Urine 600 250 450


 


  Stool   1


 


Other:   


 


  Voiding Method   Bedside Commode





   Urinal


 


  # Voids 1 1 


 


  # Bowel Movements 1  








 ABP, PAP, CO, CI - Last Documented











Arterial Blood Pressure        133/59


 


Pulmonary Artery Pressure      25/8


 


Cardiac Output                 5.5


 


Cardiac Index                  3.0

















- Labs


CBC & Chem 7: 


 06/18/18 05:42





 06/18/18 05:42


Labs: 


 Abnormal Lab Results - Last 24 Hours (Table)











  06/17/18 06/17/18 06/18/18 Range/Units





  16:32 21:11 02:05 


 


WBC     (3.8-10.6)  k/uL


 


RBC     (4.30-5.90)  m/uL


 


Hgb     (13.0-17.5)  gm/dL


 


Hct     (39.0-53.0)  %


 


RDW     (11.5-15.5)  %


 


PT     (9.0-12.0)  sec


 


INR     (<1.2)  


 


Sodium     (137-145)  mmol/L


 


Chloride     ()  mmol/L


 


Carbon Dioxide     (22-30)  mmol/L


 


BUN     (9-20)  mg/dL


 


POC Glucose (mg/dL)  212 H  176 H  153 H  (75-99)  mg/dL


 


Calcium     (8.4-10.2)  mg/dL


 


Total Protein     (6.3-8.2)  g/dL


 


Albumin     (3.5-5.0)  g/dL














  06/18/18 06/18/18 06/18/18 Range/Units





  05:42 05:42 05:42 


 


WBC  15.0 H    (3.8-10.6)  k/uL


 


RBC  2.93 L    (4.30-5.90)  m/uL


 


Hgb  8.5 L    (13.0-17.5)  gm/dL


 


Hct  26.4 L    (39.0-53.0)  %


 


RDW  18.8 H    (11.5-15.5)  %


 


PT    12.9 H  (9.0-12.0)  sec


 


INR    1.4 H  (<1.2)  


 


Sodium   134 L   (137-145)  mmol/L


 


Chloride   97 L   ()  mmol/L


 


Carbon Dioxide   32 H   (22-30)  mmol/L


 


BUN   21 H   (9-20)  mg/dL


 


POC Glucose (mg/dL)     (75-99)  mg/dL


 


Calcium   8.2 L   (8.4-10.2)  mg/dL


 


Total Protein   5.1 L   (6.3-8.2)  g/dL


 


Albumin   3.1 L   (3.5-5.0)  g/dL














  06/18/18 06/18/18 Range/Units





  05:47 11:29 


 


WBC    (3.8-10.6)  k/uL


 


RBC    (4.30-5.90)  m/uL


 


Hgb    (13.0-17.5)  gm/dL


 


Hct    (39.0-53.0)  %


 


RDW    (11.5-15.5)  %


 


PT    (9.0-12.0)  sec


 


INR    (<1.2)  


 


Sodium    (137-145)  mmol/L


 


Chloride    ()  mmol/L


 


Carbon Dioxide    (22-30)  mmol/L


 


BUN    (9-20)  mg/dL


 


POC Glucose (mg/dL)  105 H  125 H  (75-99)  mg/dL


 


Calcium    (8.4-10.2)  mg/dL


 


Total Protein    (6.3-8.2)  g/dL


 


Albumin    (3.5-5.0)  g/dL














Assessment and Plan


Plan: 


The patient was seen and examined.  The history and physical findings were 

verified.  I agree with the above assessment and plan.  The patient appears to 

be on his baseline oxygen supplementation.  He will get daily prednisone as 

directed by pulmonary.  His laboratory studies were reviewed.  We will continue 

with Coumadin.  He is stable for discharge to subacute rehab this afternoon. complaints. He was brought to the [facility] ED for further evaluation of his symptoms.     Upon arrival to the ED, the patient was afebrile, HR 71, RR 18, /60, SpO2 94% Flu A and B cx were negative. He had a UA and his stool was sent for C. difficile PCR, which was negative, and enteric pathogen cx.  He was started on IVF and was admitted as an inpatient for futher evaluation and treatment.     In the room the patient was made comfortable.  IV fluids were started.  Labs were ordered and stool was negative for C. difficile, enteric pathogens.  The patient was hydrated.  Labs/electrolytes were monitored.  Magnesium was replaced.  Patient did not have any abdominal pain.  Upon presentation the patient's white blood cell count was elevated 18.8.  That normalized on hospital day 2.  At the time of discharge the patient had not had any nausea vomiting or diarrhea for 24 hours.    He presented with acute kidney injury thought most likely due to his diarrhea resulting in hypovolemia on possible chronic kidney disease.  The patient's creatinine remained stable at 1.35 upon discharge.    The patient's chronic medical problems were all stable and her medications were continued at discharge.    Patient resides at [facility].  All his questions were answered.  He is in agreement with the plan to discharge him back to independent living at the [facility] as the patient did very well with physical therapy.    :   DISCHARGE MEDICATION LIST   Allergies: Levaquin     MEDICATION  DOSE  ROUTE  FREQUENCY  SPECIAL INSTRUCTIONS   acetaminophen (acetaminophen oral 325 mg tablet (Tylenol))  650 mg=2 tab  Oral  Every 4 hours As Needed: pain or fever     allopurinol (allopurinol oral 300 mg tablet)  1 tab  Oral  Every morning     aspirin (Aspirin Litecoat 325 mg oral tablet)  325 mg=1 tab  Oral  Daily     atorvastatin (atorvastatin oral 20 mg tablet)  40 mg=2 tab  Oral  Nightly at bedtime     cyanocobalamin (Vitamin  B12 500 mcg oral tablet)  1 tab  Oral  Every morning     digoxin (digoxin oral 125 mcg (0.125 mg) tablet)  125 mcg=1 tab  Oral  Every Monday, Wednesday, and Friday     folic acid (folic acid 0.8 mg oral tablet)  0.8 mg=1 tab  Oral  Every morning     levothyroxine (levothyroxine 25 mcg (0.025 mg) oral tablet)  25 mcg=1 tab  Oral  Every morning     metoprolol (metoprolol tartrate oral 50 mg tablet)  25 mg=0.5 tab  Oral  Every 12 hours     predniSONE (predniSONE oral 5 mg tablet)  5 mg=1 tab  Oral  Every morning     tamsulosin (Flomax oral 0.4 mg capsule)  0.8 mg=2 cap  Oral  Every evening       NEW MEDICATIONS / MEDICATIONS DISCONTINUED / DOSAGE CHANGES DURING THIS ADMISSION:    DISCHARGE INSTRUCTIONS:  DISCHARGE TO: Independent living at the [facility]  DIET: Soft as tolerated then resume cardiac diet  ACTIVITY: As tolerated    PENDING TEST RESULTS AND PROBLEMS NEEDING F/U:  Follow-up creatinine will be advisable later this week.    FOLLOW-UP APPOINTMENTS:   Follow-up with primary care physician Dr. Fajardo this week      Vitals between:   17-DEC-2017 13:07:49   TO   18-DEC-2017 13:07:49                   LAST RESULT MINIMUM MAXIMUM  Temperature 36.6 36.6 37.1  Heart Rate 54 54 60  Respiratory Rate 17 16 18  NISBP           148 148 158  NIDBP           56 56 69  SpO2                    94 92 98           Objective   General:  Alert and oriented.    Respiratory:  Lungs are clear to auscultation.    Cardiovascular:  Normal rate, Regular rhythm.    Gastrointestinal:  Soft, Non-tender, Non-distended.    Musculoskeletal:  No calf tenderness.  No swelling..    Integumentary:  Warm, Dry.    Neurologic:  Alert, Oriented.    Psychiatric:  Cooperative, Appropriate mood & affect.        Discussed with my attending    Taniya Gonzales PA-C  [facility]  [phone]  Call for questions.            Electronically Signed On 12/18/2017 14:38  __________________________________________________    TANIYA ENRIQUEZ      Electronically Signed On 12/18/2017 14:46  __________________________________________________   LUIS ALFREDO ELIZONDO              Psychiatric Hospitalist: I have seen and examined the patient independently and agree with  the hospital course and treatment plan as documented.     A/P:    Acute onset of vomiting and diarrhea, now  resolved, likely 2/2 viral gastroenteritis.    Advance diet, d/c IVF, ok to d/c home     Luis Alfredo Singh MD  [facility]   [phone]             Electronically Signed On 12/18/2017 14:46  __________________________________________________   LUIS ALFREDO ELIZONDO              2:55pm call from RN.  Bradycardic down to 30's.    Asymptomatic  stat dig level, ekg, bmp, mag level, consult pts cardiologist dr mosquera.       Taniya Evans PA-C  [facility]   [phone]           Electronically Signed On 12/18/2017 14:59  __________________________________________________   TANIYA ENRIQUEZ

## 2024-04-18 NOTE — P.ARTDOP
Arterial Doppler





LOWER EXTREMITY ARTERIAL DOPPLER:





DATE OF SERVICE: 06/09/2018





Reason for study: Pre-CABG.





Doppler waveforms: Multiphasic throughout on the left.  Atypical below the 

popliteal on the right and multiphasic more proximally..





Pulse volume recording: Mild blunting throughout..





Pressure gradients: Mild gradient above the ankle.





Ankle-brachial indices: 0.81 on the right and 0.88 on the left.





Toe pressures: [] on the right, [] on the left





Impression: Mild bilateral fem-pop disease..
WOUND CARE AFTER SKIN BIOPSY    1.  Leave the bandage on for 24-48 hours.   Do not get the area wet during this time.  Then you may get the site wet in the bath or shower- this makes removing the bandage easier.     2. After removing the bandage, gently cleanse the site daily with soap and water, pat dry and apply Vaseline (use a new, clean tube of Vaseline) and a new bandage to the area until it is healed.  The surface may bleed a little; this is expected.  Keeping the area covered will allow it to heal more quickly than if it is left open to the air.    3. Continue treating the biopsy site until fully healed.  This may take 2-3 weeks for head or neck sites.    4. When to call the Dermatology office:  • If redness, pus, increased pain, or fever occurs, you may have developed an infection. Some redness that develops around the border of the sore is normal, however.   • If bleeding occurs, press firmly over the site with clean gauze for 20-30 minutes.  Do not remove pressure before 20 minutes (ie. don't \"peek\" to see if it's still bleeding). Repeat this process at least 2 times.  If bleeding does not stop, please call.  • If irritation (redness, rash, itching) occurs from your bandage, try using a different bandage, or rotating the bandage around to avoid the irritated areas. If it persists, stop using the bandage and call.     5. You will be notified of the results by letter or phone call.  If 2 weeks have passed since your procedure and you have not received the results, please call our office.    6. If you had sutures placed, they will need to be removed in 5-7 days for the face and 10-14 days for sutures on the trunk or extremities.     You should receive your biopsy results within 2 weeks after your procedure. If you have not received your results after this time, please contact the clinic.    Thank you very much for choosing [facility]. Please call the office if you have any questions or concerns. 
    Dr. Lopez  [phone]    
no